# Patient Record
Sex: FEMALE | Race: WHITE | NOT HISPANIC OR LATINO | Employment: OTHER | URBAN - NONMETROPOLITAN AREA
[De-identification: names, ages, dates, MRNs, and addresses within clinical notes are randomized per-mention and may not be internally consistent; named-entity substitution may affect disease eponyms.]

---

## 2017-06-14 ENCOUNTER — TRANSCRIBE ORDERS (OUTPATIENT)
Dept: ADMINISTRATIVE | Facility: HOSPITAL | Age: 52
End: 2017-06-14

## 2017-06-14 ENCOUNTER — HOSPITAL ENCOUNTER (OUTPATIENT)
Dept: GENERAL RADIOLOGY | Facility: HOSPITAL | Age: 52
Discharge: HOME | End: 2017-06-14
Admitting: NURSE PRACTITIONER

## 2017-06-14 DIAGNOSIS — I51.7 CARDIOMEGALY: ICD-10-CM

## 2017-06-14 DIAGNOSIS — I51.7 CARDIOMEGALY: Primary | ICD-10-CM

## 2017-06-14 PROCEDURE — 71020 HC CHEST PA AND LATERAL: CPT

## 2017-06-14 PROCEDURE — 71020 XR CHEST PA AND LATERAL: CPT | Performed by: RADIOLOGY

## 2017-06-28 ENCOUNTER — TRANSCRIBE ORDERS (OUTPATIENT)
Dept: ADMINISTRATIVE | Facility: HOSPITAL | Age: 52
End: 2017-06-28

## 2017-06-28 DIAGNOSIS — I51.7 CARDIOMEGALY: Primary | ICD-10-CM

## 2017-07-03 ENCOUNTER — HOSPITAL ENCOUNTER (OUTPATIENT)
Dept: RESPIRATORY THERAPY | Facility: HOSPITAL | Age: 52
Discharge: HOME | End: 2017-07-03

## 2017-07-03 ENCOUNTER — HOSPITAL ENCOUNTER (OUTPATIENT)
Dept: CARDIOLOGY | Facility: HOSPITAL | Age: 52
Discharge: HOME | End: 2017-07-03
Admitting: NURSE PRACTITIONER

## 2017-07-03 ENCOUNTER — TRANSCRIBE ORDERS (OUTPATIENT)
Dept: ADMINISTRATIVE | Facility: HOSPITAL | Age: 52
End: 2017-07-03

## 2017-07-03 DIAGNOSIS — E66.9 OBESITY, UNSPECIFIED OBESITY SEVERITY, UNSPECIFIED OBESITY TYPE: ICD-10-CM

## 2017-07-03 DIAGNOSIS — I15.9 SECONDARY HYPERTENSION: ICD-10-CM

## 2017-07-03 DIAGNOSIS — I51.7 CARDIOMEGALY: ICD-10-CM

## 2017-07-03 DIAGNOSIS — I51.7 CARDIOMEGALY: Primary | ICD-10-CM

## 2017-07-03 PROCEDURE — 93005 ELECTROCARDIOGRAM TRACING: CPT | Performed by: NURSE PRACTITIONER

## 2017-07-03 PROCEDURE — 93010 ELECTROCARDIOGRAM REPORT: CPT | Performed by: INTERNAL MEDICINE

## 2017-07-03 PROCEDURE — 93306 TTE W/DOPPLER COMPLETE: CPT | Performed by: INTERNAL MEDICINE

## 2017-07-03 PROCEDURE — 93306 TTE W/DOPPLER COMPLETE: CPT

## 2017-07-05 LAB
BH CV ECHO MEAS - ACS: 1.7 CM
BH CV ECHO MEAS - AO MAX PG (FULL): 4.3 MMHG
BH CV ECHO MEAS - AO MAX PG: 10 MMHG
BH CV ECHO MEAS - AO MEAN PG (FULL): 2.4 MMHG
BH CV ECHO MEAS - AO MEAN PG: 5.4 MMHG
BH CV ECHO MEAS - AO ROOT AREA (BSA CORRECTED): 1.3
BH CV ECHO MEAS - AO ROOT AREA: 8.4 CM^2
BH CV ECHO MEAS - AO ROOT DIAM: 3.3 CM
BH CV ECHO MEAS - AO V2 MAX: 158.1 CM/SEC
BH CV ECHO MEAS - AO V2 MEAN: 109.9 CM/SEC
BH CV ECHO MEAS - AO V2 VTI: 31.7 CM
BH CV ECHO MEAS - BSA(HAYCOCK): 2.8 M^2
BH CV ECHO MEAS - BSA: 2.6 M^2
BH CV ECHO MEAS - BZI_BMI: 43.1 KILOGRAMS/M^2
BH CV ECHO MEAS - BZI_METRIC_HEIGHT: 182.9 CM
BH CV ECHO MEAS - BZI_METRIC_WEIGHT: 144.2 KG
BH CV ECHO MEAS - CONTRAST EF 4CH: 56.7 ML/M^2
BH CV ECHO MEAS - EDV(CUBED): 143.2 ML
BH CV ECHO MEAS - EDV(MOD-SP4): 67 ML
BH CV ECHO MEAS - EDV(TEICH): 131.3 ML
BH CV ECHO MEAS - EF(CUBED): 80.3 %
BH CV ECHO MEAS - EF(MOD-SP4): 56.7 %
BH CV ECHO MEAS - EF(TEICH): 72.3 %
BH CV ECHO MEAS - ESV(CUBED): 28.3 ML
BH CV ECHO MEAS - ESV(MOD-SP4): 29 ML
BH CV ECHO MEAS - ESV(TEICH): 36.3 ML
BH CV ECHO MEAS - FS: 41.8 %
BH CV ECHO MEAS - IVS/LVPW: 0.78
BH CV ECHO MEAS - IVSD: 1.1 CM
BH CV ECHO MEAS - LA DIMENSION: 2.8 CM
BH CV ECHO MEAS - LA/AO: 0.86
BH CV ECHO MEAS - LV DIASTOLIC VOL/BSA (35-75): 25.8 ML/M^2
BH CV ECHO MEAS - LV MASS(C)D: 253 GRAMS
BH CV ECHO MEAS - LV MASS(C)DI: 97.5 GRAMS/M^2
BH CV ECHO MEAS - LV MAX PG: 5.7 MMHG
BH CV ECHO MEAS - LV MEAN PG: 3 MMHG
BH CV ECHO MEAS - LV SYSTOLIC VOL/BSA (12-30): 11.2 ML/M^2
BH CV ECHO MEAS - LV V1 MAX: 119.1 CM/SEC
BH CV ECHO MEAS - LV V1 MEAN: 81.3 CM/SEC
BH CV ECHO MEAS - LV V1 VTI: 23.2 CM
BH CV ECHO MEAS - LVIDD: 5.2 CM
BH CV ECHO MEAS - LVIDS: 3 CM
BH CV ECHO MEAS - LVLD AP4: 7.4 CM
BH CV ECHO MEAS - LVLS AP4: 6 CM
BH CV ECHO MEAS - LVPWD: 1.4 CM
BH CV ECHO MEAS - MV A MAX VEL: 78.5 CM/SEC
BH CV ECHO MEAS - MV DEC TIME: 0.27 SEC
BH CV ECHO MEAS - MV E MAX VEL: 89.3 CM/SEC
BH CV ECHO MEAS - MV E/A: 1.1
BH CV ECHO MEAS - MV MAX PG: 3.3 MMHG
BH CV ECHO MEAS - MV MEAN PG: 1.2 MMHG
BH CV ECHO MEAS - MV V2 MAX: 90.3 CM/SEC
BH CV ECHO MEAS - MV V2 MEAN: 47.6 CM/SEC
BH CV ECHO MEAS - MV V2 VTI: 28 CM
BH CV ECHO MEAS - PA ACC SLOPE: 486.7 CM/SEC^2
BH CV ECHO MEAS - PA ACC TIME: 0.13 SEC
BH CV ECHO MEAS - PA MAX PG: 2.9 MMHG
BH CV ECHO MEAS - PA MEAN PG: 1.7 MMHG
BH CV ECHO MEAS - PA PR(ACCEL): 22 MMHG
BH CV ECHO MEAS - PA V2 MAX: 85.7 CM/SEC
BH CV ECHO MEAS - PA V2 MEAN: 62.4 CM/SEC
BH CV ECHO MEAS - PA V2 VTI: 19.6 CM
BH CV ECHO MEAS - RAP SYSTOLE: 10 MMHG
BH CV ECHO MEAS - RVSP: 36.7 MMHG
BH CV ECHO MEAS - SI(AO): 102.8 ML/M^2
BH CV ECHO MEAS - SI(CUBED): 44.3 ML/M^2
BH CV ECHO MEAS - SI(MOD-SP4): 14.6 ML/M^2
BH CV ECHO MEAS - SI(TEICH): 36.6 ML/M^2
BH CV ECHO MEAS - SV(AO): 266.8 ML
BH CV ECHO MEAS - SV(CUBED): 114.9 ML
BH CV ECHO MEAS - SV(MOD-SP4): 38 ML
BH CV ECHO MEAS - SV(TEICH): 95 ML
BH CV ECHO MEAS - TR MAX VEL: 258.2 CM/SEC

## 2017-07-14 ENCOUNTER — TRANSCRIBE ORDERS (OUTPATIENT)
Dept: CARDIOLOGY | Facility: CLINIC | Age: 52
End: 2017-07-14

## 2017-07-14 DIAGNOSIS — R07.9 CHEST PAIN, UNSPECIFIED TYPE: Primary | ICD-10-CM

## 2017-07-20 ENCOUNTER — OFFICE VISIT (OUTPATIENT)
Dept: CARDIOLOGY | Facility: CLINIC | Age: 52
End: 2017-07-20

## 2017-07-20 VITALS
BODY MASS INDEX: 39.68 KG/M2 | OXYGEN SATURATION: 95 % | WEIGHT: 293 LBS | SYSTOLIC BLOOD PRESSURE: 134 MMHG | HEART RATE: 72 BPM | HEIGHT: 72 IN | DIASTOLIC BLOOD PRESSURE: 82 MMHG

## 2017-07-20 DIAGNOSIS — E66.01 OBESITY, CLASS III, BMI 40-49.9 (MORBID OBESITY) (HCC): ICD-10-CM

## 2017-07-20 DIAGNOSIS — R53.83 FATIGUE, UNSPECIFIED TYPE: ICD-10-CM

## 2017-07-20 DIAGNOSIS — R07.9 CHEST PAIN IN ADULT: Primary | ICD-10-CM

## 2017-07-20 DIAGNOSIS — R60.0 BILATERAL LEG EDEMA: ICD-10-CM

## 2017-07-20 DIAGNOSIS — I10 ESSENTIAL HYPERTENSION: ICD-10-CM

## 2017-07-20 PROCEDURE — 99204 OFFICE O/P NEW MOD 45 MIN: CPT | Performed by: INTERNAL MEDICINE

## 2017-07-20 PROCEDURE — 93000 ELECTROCARDIOGRAM COMPLETE: CPT | Performed by: INTERNAL MEDICINE

## 2017-07-20 RX ORDER — LISINOPRIL 10 MG/1
TABLET ORAL
Refills: 6 | COMMUNITY
Start: 2017-06-30 | End: 2021-08-10

## 2017-07-20 RX ORDER — CLOPIDOGREL BISULFATE 75 MG/1
TABLET ORAL
Refills: 5 | COMMUNITY
Start: 2017-07-10 | End: 2022-08-15

## 2017-07-20 RX ORDER — FUROSEMIDE 20 MG/1
TABLET ORAL
Refills: 1 | COMMUNITY
Start: 2017-06-20 | End: 2019-08-27

## 2017-07-20 NOTE — PROGRESS NOTES
Subjective   Chief Complaint   Patient presents with   • Chest Pain   • Hypertension   • Edema       History of Present Illness  Patient is 52 years old white female who was sent for cardiac evaluation because of chest pain.  Patient states that she has been having chest pain in the left anterior chest lower sternal area between the breast and the sternum.  Pain is sometimes sharp and sometimes dull.  It is not related to exertion.  There is no accompanying nausea or vomiting.  There is no radiation of the pain.  Pain has been quite severe on occasions.    Patient has no prior cardiac history.  She has no rheumatic fever or heart murmur.  She had a chest x-ray which was reported as mild cardiomegaly for that reason she had an echocardiogram which was reported normal.    Hypertension  Patient is taking lisinopril 10 mg daily but she says that she does not have high blood pressure she passed her 's examination without blood pressure medications.  According to her she is taking lisinopril as precaution.    Ankle edema  Patient has lower extremity edema with stasis dermatitis she had venous Doppler done about 3 years ago which was negative.  She has been taking some Plavix.  Reason of Plavix therapy is not clear.  There is no known PAD.  Patient denies claudication and there has been no arterial Doppler study.    She is also quite a bit overweight with BMI of around 42.  She denies sleep apnea or thyroid problems  Mother had quadruple bypass in her 50s and she also was diabetic    Past Surgical History:   Procedure Laterality Date   • KNEE ARTHROSCOPY Right 2004     Family History   Problem Relation Age of Onset   • Heart attack Mother    • Heart disease Mother    • Diabetes Mother    • Kidney failure Mother    • Obesity Mother    • Kidney disease Father    • COPD Father    • Stroke Sister    • Hypertension Sister    • COPD Brother    • Heart disease Sister    • Heart attack Sister    • Heart failure Sister   "    Past Medical History:   Diagnosis Date   • Emphysema, unspecified    • Hypertension        Patient Active Problem List   Diagnosis   • Chest pain in adult   • Essential hypertension   • Bilateral leg edema   • Obesity, Class III, BMI 40-49.9 (morbid obesity)         Social History   Substance Use Topics   • Smoking status: Never Smoker   • Smokeless tobacco: None   • Alcohol use None      Comment:  occ  3-4 times a year         The following portions of the patient's history were reviewed and updated as appropriate: allergies, current medications, past family history, past medical history, past social history, past surgical history and problem list.    Allergies   Allergen Reactions   • Clindamycin/Lincomycin    • Penicillins    • Sulfa Antibiotics    • Vancomycin          Current Outpatient Prescriptions:   •  clopidogrel (PLAVIX) 75 MG tablet, TAKE ONE TABLET BY MOUTH EVERY DAY, Disp: , Rfl: 5  •  furosemide (LASIX) 20 MG tablet, , Disp: , Rfl: 1  •  lisinopril (PRINIVIL,ZESTRIL) 10 MG tablet, TAKE ONE TABLET BY MOUTH EVERY DAY, Disp: , Rfl: 6    Review of Systems   Constitution: Positive for weight gain. Negative for chills, decreased appetite, diaphoresis, fever and night sweats.   HENT: Negative.  Negative for congestion and headaches.    Eyes: Negative.    Cardiovascular: Positive for chest pain and leg swelling. Negative for claudication, cyanosis, dyspnea on exertion, irregular heartbeat, near-syncope, orthopnea, palpitations, paroxysmal nocturnal dyspnea and syncope.   Respiratory: Negative.  Negative for shortness of breath.    Endocrine: Negative.    Hematologic/Lymphatic: Negative.    Skin: Positive for color change.   Musculoskeletal: Negative.    Gastrointestinal: Negative.    Genitourinary: Negative.    Neurological: Negative.    Psychiatric/Behavioral: Negative.         Objective      /82 (BP Location: Left arm, Patient Position: Sitting)  Pulse 72  Ht 73\" (185.4 cm)  Wt (!) 321 lb (146 " kg)  SpO2 95%  BMI 42.35 kg/m2    Physical Exam   Constitutional: She appears well-developed and well-nourished.   Obesity   HENT:   Head: Normocephalic and atraumatic.   Mouth/Throat: Oropharynx is clear and moist.   Eyes: Conjunctivae and EOM are normal. Pupils are equal, round, and reactive to light. No scleral icterus.   Neck: Normal range of motion. Neck supple. No JVD present. No tracheal deviation present. No thyromegaly present.   Cardiovascular: Normal rate, regular rhythm, normal heart sounds and intact distal pulses.  Exam reveals no friction rub.    No murmur heard.  Pulmonary/Chest: Effort normal and breath sounds normal. No respiratory distress. She has no wheezes. She has no rales. She exhibits no tenderness.   Abdominal: Soft. Bowel sounds are normal. She exhibits no distension and no mass. There is no tenderness. There is no rebound and no guarding.   Musculoskeletal: Normal range of motion. She exhibits edema. She exhibits no tenderness or deformity.   Evidence of stasis dermatitis both lower extremities in the shin area   Lymphadenopathy:     She has no cervical adenopathy.   Neurological: She is alert. She has normal reflexes. No cranial nerve deficit. She exhibits normal muscle tone. Coordination normal.   Skin: Skin is warm and dry.   Psychiatric: She has a normal mood and affect. Her behavior is normal. Judgment and thought content normal.       Lab Review:  Labs ordered including T4 TSH lipid panel, CMP, CBC and stress test      ECG 12 Lead  Date/Time: 7/20/2017 5:57 PM  Performed by: LINDSEY SANCHEZ  Authorized by: LINDSEY SANCHEZ   Rhythm: sinus rhythm  Rate: normal  Conduction: conduction normal  ST Segments: ST segments normal  T Waves: T waves normal  QRS axis: normal  Other: no other findings  Clinical impression: normal ECG            I reviewed the patient's new clinical results.  I personally viewed and interpreted the patient's EKG/lab data        Assessment:    Diagnosis Plan   1. Chest pain in adult  Stress Test With Myocardial Perfusion One Day    CBC & Differential    Comprehensive Metabolic Panel    Lipid Panel   2. Essential hypertension  ECG 12 Lead   3. Obesity, Class III, BMI 40-49.9 (morbid obesity)     4. Bilateral leg edema     5. Fatigue, unspecified type  T4, Free    TSH          Plan:  Chest pain has typical and atypical features.  It is probably noncardiac.  EKG is normal further workup however is indicated because of multiple cardiac risk factors    Stress test has been scheduled for further evaluation.  Lab work was also ordered including thyroid and lipids    Patient will be reevaluated after the studies are completed.  Weight loss and risk factor modification was emphasized      Thank you for giving me the oppertunity to participate in your patient's cardiac care.    Sincerely,    NYLA Richard M.D. FACP FACC      No Follow-up on file.

## 2017-08-01 ENCOUNTER — HOSPITAL ENCOUNTER (OUTPATIENT)
Dept: CARDIOLOGY | Facility: HOSPITAL | Age: 52
Discharge: HOME | End: 2017-08-01
Attending: INTERNAL MEDICINE

## 2017-08-01 ENCOUNTER — HOSPITAL ENCOUNTER (OUTPATIENT)
Dept: NUCLEAR MEDICINE | Facility: HOSPITAL | Age: 52
Discharge: HOME | End: 2017-08-01
Attending: INTERNAL MEDICINE

## 2017-08-01 ENCOUNTER — APPOINTMENT (OUTPATIENT)
Dept: LAB | Facility: HOSPITAL | Age: 52
End: 2017-08-01

## 2017-08-01 DIAGNOSIS — R07.9 CHEST PAIN IN ADULT: ICD-10-CM

## 2017-08-01 LAB
ALBUMIN SERPL-MCNC: 4.3 G/DL (ref 3.5–5)
ALBUMIN/GLOB SERPL: 1.1 G/DL (ref 1.5–2.5)
ALP SERPL-CCNC: 58 U/L (ref 35–104)
ALT SERPL W P-5'-P-CCNC: 18 U/L (ref 10–36)
ANION GAP SERPL CALCULATED.3IONS-SCNC: 4.4 MMOL/L (ref 3.6–11.2)
AST SERPL-CCNC: 20 U/L (ref 10–30)
BASOPHILS # BLD AUTO: 0.02 10*3/MM3 (ref 0–0.3)
BASOPHILS NFR BLD AUTO: 0.3 % (ref 0–2)
BILIRUB SERPL-MCNC: 0.6 MG/DL (ref 0.2–1.8)
BUN BLD-MCNC: 14 MG/DL (ref 7–21)
BUN/CREAT SERPL: 14.6 (ref 7–25)
CALCIUM SPEC-SCNC: 9.4 MG/DL (ref 7.7–10)
CHLORIDE SERPL-SCNC: 107 MMOL/L (ref 99–112)
CHOLEST SERPL-MCNC: 151 MG/DL (ref 0–200)
CO2 SERPL-SCNC: 28.6 MMOL/L (ref 24.3–31.9)
CREAT BLD-MCNC: 0.96 MG/DL (ref 0.43–1.29)
DEPRECATED RDW RBC AUTO: 48.5 FL (ref 37–54)
EOSINOPHIL # BLD AUTO: 0.33 10*3/MM3 (ref 0–0.7)
EOSINOPHIL NFR BLD AUTO: 5 % (ref 0–5)
ERYTHROCYTE [DISTWIDTH] IN BLOOD BY AUTOMATED COUNT: 13.3 % (ref 11.5–14.5)
GFR SERPL CREATININE-BSD FRML MDRD: 61 ML/MIN/1.73
GLOBULIN UR ELPH-MCNC: 3.9 GM/DL
GLUCOSE BLD-MCNC: 138 MG/DL (ref 70–110)
HCT VFR BLD AUTO: 46.4 % (ref 37–47)
HDLC SERPL-MCNC: 29 MG/DL (ref 60–100)
HGB BLD-MCNC: 15.5 G/DL (ref 12–16)
IMM GRANULOCYTES # BLD: 0.02 10*3/MM3 (ref 0–0.03)
IMM GRANULOCYTES NFR BLD: 0.3 % (ref 0–0.5)
LDLC SERPL CALC-MCNC: 91 MG/DL (ref 0–100)
LDLC/HDLC SERPL: 3.14 {RATIO}
LYMPHOCYTES # BLD AUTO: 3.1 10*3/MM3 (ref 1–3)
LYMPHOCYTES NFR BLD AUTO: 47.1 % (ref 21–51)
MCH RBC QN AUTO: 33.7 PG (ref 27–33)
MCHC RBC AUTO-ENTMCNC: 33.4 G/DL (ref 33–37)
MCV RBC AUTO: 100.9 FL (ref 80–94)
MONOCYTES # BLD AUTO: 0.73 10*3/MM3 (ref 0.1–0.9)
MONOCYTES NFR BLD AUTO: 11.1 % (ref 0–10)
NEUTROPHILS # BLD AUTO: 2.38 10*3/MM3 (ref 1.4–6.5)
NEUTROPHILS NFR BLD AUTO: 36.2 % (ref 30–70)
OSMOLALITY SERPL CALC.SUM OF ELEC: 282.1 MOSM/KG (ref 273–305)
PLATELET # BLD AUTO: 193 10*3/MM3 (ref 130–400)
PMV BLD AUTO: 10.4 FL (ref 6–10)
POTASSIUM BLD-SCNC: 4.3 MMOL/L (ref 3.5–5.3)
PROT SERPL-MCNC: 8.2 G/DL (ref 6–8)
RBC # BLD AUTO: 4.6 10*6/MM3 (ref 4.2–5.4)
SODIUM BLD-SCNC: 140 MMOL/L (ref 135–153)
T4 FREE SERPL-MCNC: 0.86 NG/DL (ref 0.89–1.76)
TRIGL SERPL-MCNC: 154 MG/DL (ref 0–150)
TSH SERPL DL<=0.05 MIU/L-ACNC: 3.37 MIU/ML (ref 0.55–4.78)
VLDLC SERPL-MCNC: 30.8 MG/DL
WBC NRBC COR # BLD: 6.58 10*3/MM3 (ref 4.5–12.5)

## 2017-08-01 PROCEDURE — 25010000002 REGADENOSON 0.4 MG/5ML SOLUTION: Performed by: INTERNAL MEDICINE

## 2017-08-01 PROCEDURE — A9500 TC99M SESTAMIBI: HCPCS | Performed by: INTERNAL MEDICINE

## 2017-08-01 PROCEDURE — 78452 HT MUSCLE IMAGE SPECT MULT: CPT | Performed by: INTERNAL MEDICINE

## 2017-08-01 PROCEDURE — 78452 HT MUSCLE IMAGE SPECT MULT: CPT

## 2017-08-01 PROCEDURE — 93017 CV STRESS TEST TRACING ONLY: CPT

## 2017-08-01 PROCEDURE — 84443 ASSAY THYROID STIM HORMONE: CPT | Performed by: INTERNAL MEDICINE

## 2017-08-01 PROCEDURE — 25010000002 AMINOPHYLLINE PER 250 MG: Performed by: INTERNAL MEDICINE

## 2017-08-01 PROCEDURE — 84439 ASSAY OF FREE THYROXINE: CPT | Performed by: INTERNAL MEDICINE

## 2017-08-01 PROCEDURE — 80061 LIPID PANEL: CPT | Performed by: INTERNAL MEDICINE

## 2017-08-01 PROCEDURE — 0 TECHNETIUM SESTAMIBI: Performed by: INTERNAL MEDICINE

## 2017-08-01 PROCEDURE — 93018 CV STRESS TEST I&R ONLY: CPT | Performed by: INTERNAL MEDICINE

## 2017-08-01 PROCEDURE — 85025 COMPLETE CBC W/AUTO DIFF WBC: CPT | Performed by: INTERNAL MEDICINE

## 2017-08-01 PROCEDURE — 80053 COMPREHEN METABOLIC PANEL: CPT | Performed by: INTERNAL MEDICINE

## 2017-08-01 RX ORDER — AMINOPHYLLINE DIHYDRATE 25 MG/ML
125 INJECTION, SOLUTION INTRAVENOUS
Status: COMPLETED | OUTPATIENT
Start: 2017-08-01 | End: 2017-08-01

## 2017-08-01 RX ADMIN — TECHNETIUM TC-99M SESTAMIBI 1 DOSE: 1 INJECTION INTRAVENOUS at 08:05

## 2017-08-01 RX ADMIN — AMINOPHYLLINE 125 MG: 25 INJECTION, SOLUTION INTRAVENOUS at 10:08

## 2017-08-01 RX ADMIN — TECHNETIUM TC-99M SESTAMIBI 1 DOSE: 1 INJECTION INTRAVENOUS at 10:02

## 2017-08-01 RX ADMIN — REGADENOSON 0.4 MG: 0.08 INJECTION, SOLUTION INTRAVENOUS at 10:02

## 2017-08-02 LAB
BH CV NUCLEAR PRIOR STUDY: 3
BH CV STRESS BP STAGE 1: NORMAL
BH CV STRESS BP STAGE 2: NORMAL
BH CV STRESS COMMENTS STAGE 1: NORMAL
BH CV STRESS COMMENTS STAGE 2: NORMAL
BH CV STRESS DOSE REGADENOSON STAGE 1: 0.4
BH CV STRESS DURATION MIN STAGE 1: 0
BH CV STRESS DURATION MIN STAGE 2: 4
BH CV STRESS DURATION SEC STAGE 1: 15
BH CV STRESS DURATION SEC STAGE 2: 0
BH CV STRESS HR STAGE 1: 112
BH CV STRESS HR STAGE 2: 129
BH CV STRESS PROTOCOL 1: NORMAL
BH CV STRESS RECOVERY BP: NORMAL MMHG
BH CV STRESS RECOVERY HR: 91 BPM
BH CV STRESS STAGE 1: 1
BH CV STRESS STAGE 2: 2
MAXIMAL PREDICTED HEART RATE: 167 BPM
PERCENT MAX PREDICTED HR: 77.25 %
STRESS BASELINE BP: NORMAL MMHG
STRESS BASELINE HR: 79 BPM
STRESS PERCENT HR: 91 %
STRESS POST EXERCISE DUR MIN: 4 MIN
STRESS POST EXERCISE DUR SEC: 45 SEC
STRESS POST PEAK BP: NORMAL MMHG
STRESS POST PEAK HR: 129 BPM
STRESS TARGET HR: 142 BPM

## 2017-08-03 ENCOUNTER — TELEPHONE (OUTPATIENT)
Dept: CARDIOLOGY | Facility: CLINIC | Age: 52
End: 2017-08-03

## 2017-08-31 ENCOUNTER — OFFICE VISIT (OUTPATIENT)
Dept: CARDIOLOGY | Facility: CLINIC | Age: 52
End: 2017-08-31

## 2017-08-31 VITALS
DIASTOLIC BLOOD PRESSURE: 88 MMHG | SYSTOLIC BLOOD PRESSURE: 126 MMHG | WEIGHT: 293 LBS | OXYGEN SATURATION: 98 % | HEIGHT: 72 IN | BODY MASS INDEX: 39.68 KG/M2 | HEART RATE: 72 BPM

## 2017-08-31 DIAGNOSIS — R07.9 CHEST PAIN IN ADULT: ICD-10-CM

## 2017-08-31 DIAGNOSIS — R60.0 BILATERAL LEG EDEMA: ICD-10-CM

## 2017-08-31 DIAGNOSIS — E66.01 OBESITY, CLASS III, BMI 40-49.9 (MORBID OBESITY) (HCC): ICD-10-CM

## 2017-08-31 DIAGNOSIS — I10 ESSENTIAL HYPERTENSION: Primary | ICD-10-CM

## 2017-08-31 PROCEDURE — 99213 OFFICE O/P EST LOW 20 MIN: CPT | Performed by: INTERNAL MEDICINE

## 2017-09-28 ENCOUNTER — OFFICE VISIT (OUTPATIENT)
Dept: PULMONOLOGY | Facility: CLINIC | Age: 52
End: 2017-09-28

## 2017-09-28 VITALS
HEIGHT: 72 IN | WEIGHT: 293 LBS | DIASTOLIC BLOOD PRESSURE: 78 MMHG | TEMPERATURE: 98.1 F | BODY MASS INDEX: 39.68 KG/M2 | HEART RATE: 80 BPM | SYSTOLIC BLOOD PRESSURE: 115 MMHG | OXYGEN SATURATION: 95 %

## 2017-09-28 DIAGNOSIS — E66.01 MORBID OBESITY WITH BMI OF 40.0-44.9, ADULT (HCC): ICD-10-CM

## 2017-09-28 DIAGNOSIS — G47.33 OSA (OBSTRUCTIVE SLEEP APNEA): ICD-10-CM

## 2017-09-28 DIAGNOSIS — J41.0 SIMPLE CHRONIC BRONCHITIS (HCC): Primary | ICD-10-CM

## 2017-09-28 PROCEDURE — 99204 OFFICE O/P NEW MOD 45 MIN: CPT | Performed by: INTERNAL MEDICINE

## 2017-09-28 NOTE — PROGRESS NOTES
Subjective   Coni Tubbs is a 52 y.o. female who is being seen for COPD    History of Present Illness   This 52-year-old female has been referred to us by her primary care provider for evaluation of ongoing shortness of breath.  Patient was having bilateral leg swelling on and off and also was having cellulitis of her lower extremities.  He was more concerned about the leg edema and so her primary care provider from where she was sent were cardiology evaluation.  Apparently she had an echocardiogram and and a stress test.  Her cardiologist told her that she has evidence of increased right heart pressure and a pulmonary cause needs to be ruled out for that.  She was then referred to us for further evaluation and management in this regard.    Symptomatically patient tells me that she has significant exertional dyspnea but at rest she does pretty well.  She has episodic cough and occasional wheezing.  She admits having some snoring but tells me that this is not bad.  Overall her sleep is not bad as she claims that she does not feel fresh in the morning time.    Past Medical History:   Diagnosis Date   • Emphysema, unspecified    • Hypertension      Past Surgical History:   Procedure Laterality Date   • CARDIOVASCULAR STRESS TEST  08/02/2017   • ECHO - CONVERTED  07/05/2017   • KNEE ARTHROSCOPY Right 2004     Family History   Problem Relation Age of Onset   • Heart attack Mother    • Heart disease Mother    • Diabetes Mother    • Kidney failure Mother    • Obesity Mother    • Kidney disease Father    • COPD Father    • Stroke Sister    • Hypertension Sister    • COPD Brother    • Heart disease Sister    • Heart attack Sister    • Heart failure Sister       reports that she has never smoked. She does not have any smokeless tobacco history on file.  Allergies   Allergen Reactions   • Clindamycin/Lincomycin    • Penicillins    • Sulfa Antibiotics    • Vancomycin            The following portions of the patient's history  "were reviewed and updated as appropriate: allergies, current medications, past family history, past medical history, past social history, past surgical history and problem list.    Review of Systems   Constitutional: Positive for fatigue.   HENT: Positive for congestion.    Respiratory: Positive for cough, chest tightness, shortness of breath and wheezing.    Cardiovascular: Positive for leg swelling.   Allergic/Immunologic: Positive for environmental allergies.   All other systems reviewed and are negative.      Objective   /78  Pulse 80  Temp 98.1 °F (36.7 °C) (Oral)   Ht 72\" (182.9 cm)  Wt (!) 322 lb (146 kg)  SpO2 95%  BMI 43.67 kg/m2  Physical Exam   Constitutional: She is oriented to person, place, and time. She appears well-developed and well-nourished.   HENT:   Head: Normocephalic and atraumatic.    Crowded oropharynx.  Mallampati score 3   Eyes: EOM are normal. Pupils are equal, round, and reactive to light.   Neck: Normal range of motion. Neck supple.   Thick neck   Cardiovascular: Normal rate and regular rhythm.    Pulmonary/Chest: Effort normal. She has rales.   Bibasilar rales   Abdominal: Soft. Bowel sounds are normal.   Protuberant belly, abdominal obesity   Musculoskeletal: She exhibits edema.   Neurological: She is alert and oriented to person, place, and time.   Skin: Skin is warm and dry.   Psychiatric: She has a normal mood and affect. Her behavior is normal.   Nursing note and vitals reviewed.        Radiology:  Xr Chest Pa & Lateral    Result Date: 6/14/2017  Mildly enlarged cardiac silhouette. No active intrathoracic disease is demonstrated.  This report was finalized on 6/14/2017 11:11 AM by Dr. Jaime Reed II, MD.        Lab Results:  Hospital Outpatient Visit on 08/01/2017   Component Date Value Ref Range Status   • Nuclear Prior Study 08/01/2017 3   Final   • Exercise duration (sec) 08/01/2017 45  sec Final   • Exercise duration (min) 08/01/2017 4  min Final   • Target HR " (85%) 08/01/2017 142  bpm Final   • Max. Pred. HR (100%) 08/01/2017 167  bpm Final   • BH CV STRESS PROTOCOL 1 08/01/2017 Pharmacologic   Final   • Stage 1 08/01/2017 1   Final   • HR Stage 1 08/01/2017 112   Final   • BP Stage 1 08/01/2017 143/94   Final   • Duration Min Stage 1 08/01/2017 0   Final   • Duration Sec Stage 1 08/01/2017 15   Final   • Stress Dose Regadenoson Stage 1 08/01/2017 0.4   Final   • Stress Comments Stage 1 08/01/2017 15 sec bolus injection   Final   • Stage 2 08/01/2017 2   Final   • HR Stage 2 08/01/2017 129   Final   • BP Stage 2 08/01/2017 150/89   Final   • Duration Min Stage 2 08/01/2017 4   Final   • Duration Sec Stage 2 08/01/2017 0   Final   • Stress Comments Stage 2 08/01/2017 recovery   Final   • Baseline HR 08/01/2017 79  bpm Final   • Baseline BP 08/01/2017 143/100  mmHg Final   • Peak HR 08/01/2017 129  bpm Final   • Percent Max Pred HR 08/01/2017 77.25  % Final   • Percent Target HR 08/01/2017 91  % Final   • Peak BP 08/01/2017 159/110  mmHg Final   • Recovery HR 08/01/2017 91  bpm Final   • Recovery BP 08/01/2017 124/90  mmHg Final       Assessment      ICD-10-CM ICD-9-CM   1. Simple chronic bronchitis J41.0 491.0   2. SUSAN (obstructive sleep apnea) G47.33 327.23   3. Morbid obesity with BMI of 40.0-44.9, adult E66.01 278.01    Z68.41 V85.41                DISCUSSION:  This morbidly obese patient comes to us as she was told by her cardiologist that she has increased right heart pressure and he wanted to rule out pulmonary cause.  Patient also gives me a history of episodic leg swelling requiring diuretics.  I have reviewed her echocardiogram which shows evidence of mild diastolic dysfunction which may account for some of the bottoms of shortness of breath and swelling of leg.  But from pulmonary standpoint our differential diagnosis would include nocturnal hypoxemia leading to increased fluid retention, obstructive sleep apnea comes high in the list.  We are sending her for a  nocturnal polysomnography to evaluate that.  Patient also has exertional dyspnea for which we would do a pulmonary function test for objective assessment of her airways function.  I have reviewed her chest x-ray, there is evidence of pulmonary vascular congestion.  Cannot rule out coexisting interstitial fibrotic changes.  We are sending her for a CT scan of the chest with high resolution and thin cut  I would see her again after this test and further management plan will depend on the findings.        Plan    Orders Placed This Encounter   Procedures   • CT Chest Without Contrast   • Ambulatory Referral to Sleep Medicine   • Pulmonary Function Test     No orders of the defined types were placed in this encounter.                 Nell Rangel MD, FCCP, [facility]  Pulmonary, Critical Care, and Sleep Medicine

## 2017-11-06 ENCOUNTER — HOSPITAL ENCOUNTER (OUTPATIENT)
Dept: CT IMAGING | Facility: HOSPITAL | Age: 52
Discharge: HOME | End: 2017-11-06
Attending: INTERNAL MEDICINE

## 2017-11-06 ENCOUNTER — HOSPITAL ENCOUNTER (OUTPATIENT)
Dept: RESPIRATORY THERAPY | Facility: HOSPITAL | Age: 52
Discharge: HOME | End: 2017-11-06
Attending: INTERNAL MEDICINE | Admitting: INTERNAL MEDICINE

## 2017-11-06 DIAGNOSIS — E66.01 MORBID OBESITY WITH BMI OF 40.0-44.9, ADULT (HCC): ICD-10-CM

## 2017-11-06 DIAGNOSIS — G47.33 OSA (OBSTRUCTIVE SLEEP APNEA): ICD-10-CM

## 2017-11-06 DIAGNOSIS — J41.0 SIMPLE CHRONIC BRONCHITIS (HCC): ICD-10-CM

## 2017-11-06 PROCEDURE — 94729 DIFFUSING CAPACITY: CPT | Performed by: INTERNAL MEDICINE

## 2017-11-06 PROCEDURE — 71250 CT THORAX DX C-: CPT

## 2017-11-06 PROCEDURE — 71250 CT THORAX DX C-: CPT | Performed by: RADIOLOGY

## 2017-11-06 PROCEDURE — 94060 EVALUATION OF WHEEZING: CPT

## 2017-11-06 PROCEDURE — 94727 GAS DIL/WSHOT DETER LNG VOL: CPT

## 2017-11-06 PROCEDURE — 94727 GAS DIL/WSHOT DETER LNG VOL: CPT | Performed by: INTERNAL MEDICINE

## 2017-11-06 PROCEDURE — 94729 DIFFUSING CAPACITY: CPT

## 2017-11-06 PROCEDURE — 94060 EVALUATION OF WHEEZING: CPT | Performed by: INTERNAL MEDICINE

## 2017-11-28 ENCOUNTER — OFFICE VISIT (OUTPATIENT)
Dept: PULMONOLOGY | Facility: CLINIC | Age: 52
End: 2017-11-28

## 2017-11-28 VITALS
TEMPERATURE: 97.4 F | BODY MASS INDEX: 39.68 KG/M2 | DIASTOLIC BLOOD PRESSURE: 84 MMHG | SYSTOLIC BLOOD PRESSURE: 136 MMHG | HEART RATE: 75 BPM | HEIGHT: 72 IN | OXYGEN SATURATION: 97 % | WEIGHT: 293 LBS

## 2017-11-28 DIAGNOSIS — E66.01 MORBID OBESITY WITH BMI OF 40.0-44.9, ADULT (HCC): ICD-10-CM

## 2017-11-28 DIAGNOSIS — R94.2 RESTRICTIVE VENTILATORY DEFECT: ICD-10-CM

## 2017-11-28 DIAGNOSIS — G47.33 OSA (OBSTRUCTIVE SLEEP APNEA): Primary | ICD-10-CM

## 2017-11-28 PROCEDURE — 99214 OFFICE O/P EST MOD 30 MIN: CPT | Performed by: INTERNAL MEDICINE

## 2017-11-28 RX ORDER — LOSARTAN POTASSIUM 25 MG/1
25 TABLET ORAL DAILY
COMMUNITY
End: 2018-02-28

## 2017-11-28 RX ORDER — ALBUTEROL SULFATE 90 UG/1
2 AEROSOL, METERED RESPIRATORY (INHALATION) EVERY 4 HOURS PRN
Qty: 1 INHALER | Refills: 11 | Status: SHIPPED | OUTPATIENT
Start: 2017-11-28

## 2017-11-28 NOTE — PROGRESS NOTES
Subjective   Coni Tubbs is a 52 y.o. female who is being seen for Results    History of Present Illness   Patient returns for follow-up for ongoing shortness of breath and sleep disturbance.  This patient was sent to us for evaluation of pulmonary problems in view of increased right heart pressure found by her cardiologist in an echocardiogram. We have sent her for a pulmonary function test and a nocturnal polysomnography, she had those done today was to discuss the results.    Symptom wise she still has the same complaint of exertional dyspnea as well as fragmented sleep, unrefreshed feeling in the morning time and increased daytime sleepiness and fatigue.  She also has swelling of her ankles as before.  Past Medical History:   Diagnosis Date   • Emphysema, unspecified    • Hypertension      Past Surgical History:   Procedure Laterality Date   • CARDIOVASCULAR STRESS TEST  08/02/2017   • ECHO - CONVERTED  07/05/2017   • KNEE ARTHROSCOPY Right 2004     Family History   Problem Relation Age of Onset   • Heart attack Mother    • Heart disease Mother    • Diabetes Mother    • Kidney failure Mother    • Obesity Mother    • Kidney disease Father    • COPD Father    • Stroke Sister    • Hypertension Sister    • COPD Brother    • Heart disease Sister    • Heart attack Sister    • Heart failure Sister       reports that she has never smoked. She has never used smokeless tobacco. She reports that she does not drink alcohol or use illicit drugs.  Allergies   Allergen Reactions   • Clindamycin/Lincomycin    • Penicillins    • Sulfa Antibiotics    • Vancomycin            The following portions of the patient's history were reviewed and updated as appropriate: allergies, current medications, past family history, past medical history, past social history, past surgical history and problem list.    Review of Systems   Constitutional: Negative for appetite change, chills, diaphoresis and unexpected weight change.   HENT: Negative  "for sore throat, trouble swallowing and voice change.    Eyes: Negative for visual disturbance.   Respiratory: Positive for shortness of breath. Negative for apnea, cough, choking and wheezing.    Cardiovascular: Negative for chest pain, palpitations and leg swelling.   Gastrointestinal: Negative for abdominal pain, constipation, diarrhea, nausea and vomiting.   Endocrine: Negative for cold intolerance, heat intolerance, polydipsia, polyphagia and polyuria.   Genitourinary: Negative for difficulty urinating and dysuria.   Musculoskeletal: Negative for gait problem.   Skin: Negative for rash and wound.   Neurological: Negative for syncope and light-headedness.   Hematological: Negative for adenopathy.   Psychiatric/Behavioral: Negative for agitation, behavioral problems and confusion.   All other systems reviewed and are negative.      Objective   /84 (BP Location: Left arm, Patient Position: Sitting)  Pulse 75  Temp 97.4 °F (36.3 °C)  Ht 72\" (182.9 cm)  Wt (!) 331 lb 3.2 oz (150 kg)  SpO2 97%  BMI 44.92 kg/m2  Physical Exam   Constitutional: She is oriented to person, place, and time.   HENT:   Head: Normocephalic and atraumatic.   Nose: Mucosal edema present.   Eyes: EOM are normal. Pupils are equal, round, and reactive to light.   Neck: Neck supple.   Cardiovascular: Normal rate, regular rhythm and normal heart sounds.    Pulmonary/Chest: She has rhonchi.   Vesicular breath sound bilaterally with prolonged expiratory phase   Abdominal: Soft. Bowel sounds are normal.   Musculoskeletal: Normal range of motion. She exhibits no deformity.   Neurological: She is alert and oriented to person, place, and time.   Skin: Skin is warm and dry.   Psychiatric: She has a normal mood and affect. Her behavior is normal.   Nursing note and vitals reviewed.        Radiology:  Ct Chest Without Contrast    Result Date: 11/7/2017  EXAM: CT CHEST WO CONTRAST-               CLINICAL INDICATION:Rule out interstitial lung " disease; J41.0-Simple  chronic bronchitis; G47.33-Obstructive sleep apnea (adult) (pediatric);  E66.01-Morbid (severe) obesity due to excess calories; Z68.41-Body mass  index (BMI) 40.0-44.9, adult       COMPARISON: NONE.      TECHNIQUE: Multiple axial CT images were obtained from lung apex through  upper abdomen WITHOUTadministration of IV contrast. Reformatted images  in the coronal and/or sagittal plane(s) were generated from the axial  data set to facilitate diagnostic accuracy and/or surgical planning.      High-resolution sequences obtained in the axial plane at selected  intervals per high-resolution protocol.  Oral Contrast:NONE.      Radiation dose reduction techniques were utilized per ALARA protocol.  Automated exposure control was initiated through either or [employer] or  DoseRight software packages by  protocol.    DOSE (DLP mGy-cm): 810.79 mGy.cm          FINDINGS:      The pulmonary interstitium is within normal limits. No abnormal soft  tissue nodules identified. No interlobular septal thickening. No  peripheral fibrosis. No abnormal groundglass attenuation. No  centrilobular nodularity. Calcified granuloma right upper lobe is noted.      Mild cardiac enlargement. No pleural or pericardial effusion. There is  no thoracic adenopathy by CT size criteria. Hiatal hernia is present  moderate in size with accompanying herniated fat. Upper abdomen is  otherwise unremarkable except for layering gallstones.      Bone windows demonstrate no acute osseous findings. Degenerative changes  are noted.      IMPRESSION:      1. No CT evidence of abnormal interstitial lung changes.  2. Calcified granuloma right lung.  3. Mild cardiomegaly.  4. Moderate hiatal hernia.  5. Cholelithiasis and other nonacute findings as above.      This report was finalized on 11/7/2017 9:12 AM by Dr. Benja Duong MD.        Lab Results:  Hospital Outpatient Visit on 08/01/2017   Component Date Value Ref Range Status   •  Nuclear Prior Study 08/01/2017 3   Final   • Exercise duration (sec) 08/01/2017 45  sec Final   • Exercise duration (min) 08/01/2017 4  min Final   • Target HR (85%) 08/01/2017 142  bpm Final   • Max. Pred. HR (100%) 08/01/2017 167  bpm Final   •  CV STRESS PROTOCOL 1 08/01/2017 Pharmacologic   Final   • Stage 1 08/01/2017 1   Final   • HR Stage 1 08/01/2017 112   Final   • BP Stage 1 08/01/2017 143/94   Final   • Duration Min Stage 1 08/01/2017 0   Final   • Duration Sec Stage 1 08/01/2017 15   Final   • Stress Dose Regadenoson Stage 1 08/01/2017 0.4   Final   • Stress Comments Stage 1 08/01/2017 15 sec bolus injection   Final   • Stage 2 08/01/2017 2   Final   • HR Stage 2 08/01/2017 129   Final   • BP Stage 2 08/01/2017 150/89   Final   • Duration Min Stage 2 08/01/2017 4   Final   • Duration Sec Stage 2 08/01/2017 0   Final   • Stress Comments Stage 2 08/01/2017 recovery   Final   • Baseline HR 08/01/2017 79  bpm Final   • Baseline BP 08/01/2017 143/100  mmHg Final   • Peak HR 08/01/2017 129  bpm Final   • Percent Max Pred HR 08/01/2017 77.25  % Final   • Percent Target HR 08/01/2017 91  % Final   • Peak BP 08/01/2017 159/110  mmHg Final   • Recovery HR 08/01/2017 91  bpm Final   • Recovery BP 08/01/2017 124/90  mmHg Final           Assessment      ICD-10-CM ICD-9-CM   1. SUSAN (obstructive sleep apnea) G47.33 327.23   2. Morbid obesity with BMI of 40.0-44.9, adult E66.01 278.01    Z68.41 V85.41   3. Restrictive ventilatory defect R94.2 794.2                DISCUSSION:  I have reviewed the pulmonary function test as well as the nocturnal polysomnography.  The pulmonary function test revealed restrictive ventilatory defect of mild degree.  The polysomnography revealed presence of obstructive sleep apnea with oxygen desaturation, lowest O2 sat of 85%.    This patient has a BMI of 44 and I believe this morbid obesity along with obstructive sleep apnea is at least in part contributing to her elevated right heart  pressure I had a long discussion with the patient and recommended that she be started on pressure therapy as quick as possible.  I'm giving her AutoPap with a pressure range of 5-15 cm water.  I'm also giving her a prescription for albuterol for when necessary use.    We would reevaluate her again in a month or so.    Plan    Orders Placed This Encounter   Procedures   • PAP Therapy     New Medications Ordered This Visit   Medications   • albuterol (PROAIR HFA) 108 (90 Base) MCG/ACT inhaler     Sig: Inhale 2 puffs Every 4 (Four) Hours As Needed for Shortness of Air.     Dispense:  1 inhaler     Refill:  11                  Nell Rangel MD, FCCP, FAASM  Pulmonary, Critical Care, and Sleep Medicine

## 2018-02-28 ENCOUNTER — OFFICE VISIT (OUTPATIENT)
Dept: CARDIOLOGY | Facility: CLINIC | Age: 53
End: 2018-02-28

## 2018-02-28 VITALS
OXYGEN SATURATION: 96 % | WEIGHT: 293 LBS | HEART RATE: 76 BPM | BODY MASS INDEX: 39.68 KG/M2 | HEIGHT: 72 IN | DIASTOLIC BLOOD PRESSURE: 82 MMHG | SYSTOLIC BLOOD PRESSURE: 118 MMHG

## 2018-02-28 DIAGNOSIS — E66.01 OBESITY, CLASS III, BMI 40-49.9 (MORBID OBESITY) (HCC): ICD-10-CM

## 2018-02-28 DIAGNOSIS — I10 ESSENTIAL HYPERTENSION: Primary | ICD-10-CM

## 2018-02-28 PROCEDURE — 99213 OFFICE O/P EST LOW 20 MIN: CPT | Performed by: INTERNAL MEDICINE

## 2018-02-28 NOTE — PROGRESS NOTES
subjective     Chief Complaint   Patient presents with   • Follow-up   • Hypertension     History of Present Illness  Patient is 52 years old white female who has history of hypertension, obesity, obstructive sleep apnea and bilateral lower extremity edema.  Patient is here for 6 months follow-up.  She seems to be doing somewhat better.  She has not lost any weight.    Patient recently saw Dr. Rangel and has been using CPAP which according to her has not helped much.  Patient had pulmonary function studies done lung volumes show reduced total lung capacity to 48%.  FEV1 was 77% predicted with no improvement after bronchodilator therapy.    Patient is quite heavy and is struggling to lose weight.    Past Surgical History:   Procedure Laterality Date   • CARDIOVASCULAR STRESS TEST  08/02/2017   • ECHO - CONVERTED  07/05/2017   • KNEE ARTHROSCOPY Right 2004   • SKIN CANCER EXCISION Left 12/2017     Family History   Problem Relation Age of Onset   • Heart attack Mother    • Heart disease Mother    • Diabetes Mother    • Kidney failure Mother    • Obesity Mother    • Kidney disease Father    • COPD Father    • Stroke Sister    • Hypertension Sister    • COPD Brother    • Heart disease Sister    • Heart attack Sister    • Heart failure Sister      Past Medical History:   Diagnosis Date   • Emphysema, unspecified    • Hypertension      Patient Active Problem List   Diagnosis   • Chest pain in adult   • Essential hypertension   • Bilateral leg edema   • Obesity, Class III, BMI 40-49.9 (morbid obesity)       Social History   Substance Use Topics   • Smoking status: Never Smoker   • Smokeless tobacco: Never Used   • Alcohol use No      Comment:  occ  3-4 times a year       Allergies   Allergen Reactions   • Penicillins Other (See Comments)     Unknown    • Clindamycin/Lincomycin Itching and Rash   • Sulfa Antibiotics Itching and Rash   • Vancomycin Itching and Rash       Current Outpatient Prescriptions on File Prior to Visit  "  Medication Sig   • clopidogrel (PLAVIX) 75 MG tablet TAKE ONE TABLET BY MOUTH EVERY DAY   • furosemide (LASIX) 20 MG tablet PT TAKING EVERY OTHER DAY   • lisinopril (PRINIVIL,ZESTRIL) 10 MG tablet TAKE ONE TABLET BY MOUTH EVERY DAY   • albuterol (PROAIR HFA) 108 (90 Base) MCG/ACT inhaler Inhale 2 puffs Every 4 (Four) Hours As Needed for Shortness of Air.   • [DISCONTINUED] losartan (COZAAR) 25 MG tablet Take 25 mg by mouth Daily.     No current facility-administered medications on file prior to visit.          The following portions of the patient's history were reviewed and updated as appropriate: allergies, current medications, past family history, past medical history, past social history, past surgical history and problem list.    Review of Systems   Constitution: Negative.   HENT: Negative.  Negative for congestion.    Eyes: Negative.    Cardiovascular: Positive for dyspnea on exertion. Negative for chest pain, cyanosis, irregular heartbeat, leg swelling, near-syncope, orthopnea, palpitations, paroxysmal nocturnal dyspnea and syncope.   Respiratory: Positive for shortness of breath.    Hematologic/Lymphatic: Negative.    Musculoskeletal: Negative.    Gastrointestinal: Negative.    Neurological: Negative.  Negative for headaches.          Objective:     /82 (BP Location: Left arm, Patient Position: Sitting, Cuff Size: Adult)  Pulse 76  Ht 182.9 cm (72.01\")  Wt (!) 151 kg (332 lb 3.2 oz)  SpO2 96%  BMI 45.04 kg/m2  Physical Exam   Constitutional: She appears well-developed and well-nourished. No distress.   HENT:   Head: Normocephalic and atraumatic.   Mouth/Throat: Oropharynx is clear and moist. No oropharyngeal exudate.   Eyes: Conjunctivae and EOM are normal. Pupils are equal, round, and reactive to light. No scleral icterus.   Neck: Normal range of motion. Neck supple. No JVD present. No tracheal deviation present. No thyromegaly present.   Cardiovascular: Normal rate, regular rhythm, normal " heart sounds and intact distal pulses.  PMI is not displaced.  Exam reveals no gallop, no friction rub and no decreased pulses.    No murmur heard.  Pulses:       Carotid pulses are 3+ on the right side, and 3+ on the left side.       Radial pulses are 3+ on the right side, and 3+ on the left side.   Pulmonary/Chest: Effort normal and breath sounds normal. No respiratory distress. She has no wheezes. She has no rales. She exhibits no tenderness.   Abdominal: Soft. Bowel sounds are normal. She exhibits no distension, no abdominal bruit and no mass. There is no splenomegaly or hepatomegaly. There is no tenderness. There is no rebound and no guarding.   Musculoskeletal: Normal range of motion. She exhibits no edema, tenderness or deformity.   Lymphadenopathy:     She has no cervical adenopathy.   Neurological: She is alert. She has normal reflexes. No cranial nerve deficit. She exhibits normal muscle tone. Coordination normal.   Skin: Skin is warm and dry. No rash noted. She is not diaphoretic. No erythema.   Psychiatric: She has a normal mood and affect. Her behavior is normal. Judgment and thought content normal.         Lab Review  Lab Results   Component Value Date     08/01/2017    K 4.3 08/01/2017     08/01/2017    BUN 14 08/01/2017    CREATININE 0.96 08/01/2017    GLUCOSE 138 (H) 08/01/2017    CALCIUM 9.4 08/01/2017    ALT 18 08/01/2017    ALKPHOS 58 08/01/2017    LABIL2 1.1 (L) 08/01/2017     No results found for: CKTOTAL  Lab Results   Component Value Date    WBC 6.58 08/01/2017    HGB 15.5 08/01/2017    HCT 46.4 08/01/2017     08/01/2017     No results found for: INR  No results found for: MG  Lab Results   Component Value Date    TSH 3.369 08/01/2017     No results found for: BNP  Lab Results   Component Value Date    CHOL 151 08/01/2017    TRIG 154 (H) 08/01/2017    HDL 29 (L) 08/01/2017    VLDL 30.8 08/01/2017    LDLHDL 3.14 08/01/2017         Procedures       I personally viewed and  interpreted the patient's LAB data         Assessment:     1. Essential hypertension    2. Obesity, Class III, BMI 40-49.9 (morbid obesity)          Plan:       Blood pressure is controlled with lisinopril.  Patient has morbid obesity and has not lost any weight.  She is trying.  She has a COPD and restrictive lung disease.  Because of obstructive sleep apnea she is using CPAP but is not helping much.  Weight loss was emphasized.  No change in therapy.  Healthy lifestyle and aggressive risk factor modification emphasized.              Return in about 6 months (around 8/28/2018).

## 2018-03-07 ENCOUNTER — OFFICE VISIT (OUTPATIENT)
Dept: PULMONOLOGY | Facility: CLINIC | Age: 53
End: 2018-03-07

## 2018-03-07 VITALS
OXYGEN SATURATION: 96 % | SYSTOLIC BLOOD PRESSURE: 143 MMHG | WEIGHT: 293 LBS | DIASTOLIC BLOOD PRESSURE: 85 MMHG | BODY MASS INDEX: 39.68 KG/M2 | TEMPERATURE: 98.1 F | HEART RATE: 71 BPM | HEIGHT: 72 IN

## 2018-03-07 DIAGNOSIS — E66.01 MORBID OBESITY WITH BMI OF 40.0-44.9, ADULT (HCC): ICD-10-CM

## 2018-03-07 DIAGNOSIS — R94.2 RESTRICTIVE VENTILATORY DEFECT: ICD-10-CM

## 2018-03-07 DIAGNOSIS — G47.33 OSA (OBSTRUCTIVE SLEEP APNEA): Primary | ICD-10-CM

## 2018-03-07 PROCEDURE — 99214 OFFICE O/P EST MOD 30 MIN: CPT | Performed by: INTERNAL MEDICINE

## 2018-03-07 NOTE — PROGRESS NOTES
Subjective   Coni Tubbs is a 52 y.o. female who is being seen for Sleep Apnea    History of Present Illness   Patient returns for follow-up for sleep apnea.  During her last visit we started her on pressure therapy.  She tells me that she is using the mask on a nightly basis and has improved quite a bit in the daytime.  She also mentions that she got a call from DME company that she was not fully compliant with the machine.  She admits that she goes to bed at around 12 midnight and wakes up around 6.  But claims that she sleeps good and feels better in the morning time.  Past Medical History:   Diagnosis Date   • Emphysema, unspecified    • Hypertension      Past Surgical History:   Procedure Laterality Date   • CARDIOVASCULAR STRESS TEST  08/02/2017   • ECHO - CONVERTED  07/05/2017   • KNEE ARTHROSCOPY Right 2004   • SKIN CANCER EXCISION Left 12/2017     Family History   Problem Relation Age of Onset   • Heart attack Mother    • Heart disease Mother    • Diabetes Mother    • Kidney failure Mother    • Obesity Mother    • Kidney disease Father    • COPD Father    • Stroke Sister    • Hypertension Sister    • COPD Brother    • Heart disease Sister    • Heart attack Sister    • Heart failure Sister       reports that she has never smoked. She has never used smokeless tobacco. She reports that she does not drink alcohol or use illicit drugs.  Allergies   Allergen Reactions   • Penicillins Other (See Comments)     Unknown    • Clindamycin/Lincomycin Itching and Rash   • Sulfa Antibiotics Itching and Rash   • Vancomycin Itching and Rash           The following portions of the patient's history were reviewed and updated as appropriate: allergies, current medications, past family history, past medical history, past social history, past surgical history and problem list.    Review of Systems   Constitutional: Positive for fatigue. Negative for appetite change, chills, diaphoresis and unexpected weight change.   HENT:  "Negative for sore throat, trouble swallowing and voice change.    Eyes: Negative for visual disturbance.   Respiratory: Positive for shortness of breath. Negative for apnea, cough, choking and wheezing.    Cardiovascular: Negative for chest pain, palpitations and leg swelling.   Gastrointestinal: Negative for abdominal pain, constipation, diarrhea, nausea and vomiting.   Endocrine: Negative for cold intolerance, heat intolerance, polydipsia, polyphagia and polyuria.   Genitourinary: Negative for difficulty urinating and dysuria.   Musculoskeletal: Negative for gait problem.   Skin: Negative for rash and wound.   Neurological: Negative for syncope and light-headedness.   Hematological: Negative for adenopathy.   Psychiatric/Behavioral: Negative for agitation, behavioral problems and confusion.   All other systems reviewed and are negative.      Objective   /85  Pulse 71  Temp 98.1 °F (36.7 °C)  Ht 182.9 cm (72.01\")  Wt (!) 150 kg (331 lb 3.2 oz)  SpO2 96%  BMI 44.91 kg/m2  Physical Exam   Constitutional: She is oriented to person, place, and time.   HENT:   Head: Normocephalic and atraumatic.   Nose: Mucosal edema present.   Eyes: EOM are normal. Pupils are equal, round, and reactive to light.   Neck: Neck supple.   Cardiovascular: Normal rate, regular rhythm and normal heart sounds.    Pulmonary/Chest: She has rhonchi.   Vesicular breath sound bilaterally with prolonged expiratory phase   Abdominal: Soft. Bowel sounds are normal.   Musculoskeletal: Normal range of motion. She exhibits no deformity.   Neurological: She is alert and oriented to person, place, and time.   Skin: Skin is warm and dry.   Psychiatric: She has a normal mood and affect. Her behavior is normal.   Nursing note and vitals reviewed.        Radiology:  No Images in the past 120 days found..    Lab Results:  Hospital Outpatient Visit on 08/01/2017   Component Date Value Ref Range Status   • Nuclear Prior Study 08/01/2017 3   Final   • " Exercise duration (sec) 08/01/2017 45  sec Final   • Exercise duration (min) 08/01/2017 4  min Final   • Target HR (85%) 08/01/2017 142  bpm Final   • Max. Pred. HR (100%) 08/01/2017 167  bpm Final   • BH CV STRESS PROTOCOL 1 08/01/2017 Pharmacologic   Final   • Stage 1 08/01/2017 1   Final   • HR Stage 1 08/01/2017 112   Final   • BP Stage 1 08/01/2017 143/94   Final   • Duration Min Stage 1 08/01/2017 0   Final   • Duration Sec Stage 1 08/01/2017 15   Final   • Stress Dose Regadenoson Stage 1 08/01/2017 0.4   Final   • Stress Comments Stage 1 08/01/2017 15 sec bolus injection   Final   • Stage 2 08/01/2017 2   Final   • HR Stage 2 08/01/2017 129   Final   • BP Stage 2 08/01/2017 150/89   Final   • Duration Min Stage 2 08/01/2017 4   Final   • Duration Sec Stage 2 08/01/2017 0   Final   • Stress Comments Stage 2 08/01/2017 recovery   Final   • Baseline HR 08/01/2017 79  bpm Final   • Baseline BP 08/01/2017 143/100  mmHg Final   • Peak HR 08/01/2017 129  bpm Final   • Percent Max Pred HR 08/01/2017 77.25  % Final   • Percent Target HR 08/01/2017 91  % Final   • Peak BP 08/01/2017 159/110  mmHg Final   • Recovery HR 08/01/2017 91  bpm Final   • Recovery BP 08/01/2017 124/90  mmHg Final       Assessment      ICD-10-CM ICD-9-CM   1. SUSAN (obstructive sleep apnea) G47.33 327.23   2. Morbid obesity with BMI of 40.0-44.9, adult E66.01 278.01    Z68.41 V85.41   3. Restrictive ventilatory defect R94.2 794.2                DISCUSSION:  Patient claims that she is using her CPAP now on a nightly basis and remains compliant with that.  I have encouragedHer to remain compliant.  We also discussed about sleep hygiene.  She usually goes to bed at 11:30 and wakes up at 6:30.  I told her to increase her total time in bed for another hour which give her enough time to sleep.  Gentleman that she is trying to do that anyway.    I am giving her a supply for CPAP machine and would see her again in approximately 6 months from now.    Plan     Orders Placed This Encounter   Procedures   • Miscellaneous DME     No orders of the defined types were placed in this encounter.                 Nell Rangel MD, FCCP, FAASM  Pulmonary, Critical Care, and Sleep Medicine

## 2018-08-28 ENCOUNTER — OFFICE VISIT (OUTPATIENT)
Dept: CARDIOLOGY | Facility: CLINIC | Age: 53
End: 2018-08-28

## 2018-08-28 VITALS
HEART RATE: 72 BPM | OXYGEN SATURATION: 99 % | BODY MASS INDEX: 39.68 KG/M2 | SYSTOLIC BLOOD PRESSURE: 120 MMHG | WEIGHT: 293 LBS | RESPIRATION RATE: 18 BRPM | DIASTOLIC BLOOD PRESSURE: 78 MMHG | HEIGHT: 72 IN

## 2018-08-28 DIAGNOSIS — I10 ESSENTIAL HYPERTENSION: Primary | ICD-10-CM

## 2018-08-28 DIAGNOSIS — E66.01 OBESITY, CLASS III, BMI 40-49.9 (MORBID OBESITY) (HCC): ICD-10-CM

## 2018-08-28 DIAGNOSIS — R60.0 BILATERAL LEG EDEMA: ICD-10-CM

## 2018-08-28 PROCEDURE — 99213 OFFICE O/P EST LOW 20 MIN: CPT | Performed by: INTERNAL MEDICINE

## 2018-08-28 RX ORDER — ALLOPURINOL 100 MG/1
100 TABLET ORAL DAILY
Refills: 2 | COMMUNITY
Start: 2018-08-20

## 2018-08-28 RX ORDER — AMITRIPTYLINE HYDROCHLORIDE 10 MG/1
10 TABLET, FILM COATED ORAL DAILY
Refills: 0 | COMMUNITY
Start: 2018-07-19 | End: 2019-02-28 | Stop reason: ALTCHOICE

## 2018-08-28 RX ORDER — COLCHICINE 0.6 MG/1
0.6 TABLET ORAL 2 TIMES DAILY PRN
Refills: 0 | COMMUNITY
Start: 2018-07-20 | End: 2020-02-24

## 2018-08-28 NOTE — PROGRESS NOTES
subjective     Chief Complaint   Patient presents with   • Hypertension     History of Present Illness    Patient is 53 years old white female who is here for follow-up of hypertension and chest pain.  Patient had a stress test last year which was negative for significant exercise-induced myocardial ischemia.    She has been doing very well currently she is taking lisinopril 10 mg daily along with Lasix 20 mg every other day.  Blood pressure has been very well controlled.  She has had no ankle edema.  Patient states that she had an attack of gout and now she is taking Zyloprim    She has had no chest pain.  She is trying to lose weight and is quite a bit overweight.      Past Surgical History:   Procedure Laterality Date   • CARDIOVASCULAR STRESS TEST  08/02/2017   • ECHO - CONVERTED  07/05/2017   • KNEE ARTHROSCOPY Right 2004   • SKIN CANCER EXCISION Left 12/2017     Family History   Problem Relation Age of Onset   • Heart attack Mother    • Heart disease Mother    • Diabetes Mother    • Kidney failure Mother    • Obesity Mother    • Kidney disease Father    • COPD Father    • Stroke Sister    • Hypertension Sister    • COPD Brother    • Heart disease Sister    • Heart attack Sister    • Heart failure Sister      Past Medical History:   Diagnosis Date   • Emphysema, unspecified    • Hypertension      Patient Active Problem List   Diagnosis   • Chest pain in adult   • Essential hypertension   • Bilateral leg edema   • Obesity, Class III, BMI 40-49.9 (morbid obesity) (CMS/[facility])       Social History   Substance Use Topics   • Smoking status: Never Smoker   • Smokeless tobacco: Never Used   • Alcohol use No      Comment:  occ  3-4 times a year       Allergies   Allergen Reactions   • Penicillins Other (See Comments)     Unknown    • Clindamycin/Lincomycin Itching and Rash   • Sulfa Antibiotics Itching and Rash   • Vancomycin Itching and Rash       Current Outpatient Prescriptions on File Prior to Visit   Medication Sig  "  • albuterol (PROAIR HFA) 108 (90 Base) MCG/ACT inhaler Inhale 2 puffs Every 4 (Four) Hours As Needed for Shortness of Air.   • clopidogrel (PLAVIX) 75 MG tablet TAKE ONE TABLET BY MOUTH EVERY DAY   • furosemide (LASIX) 20 MG tablet PT TAKING EVERY OTHER DAY   • lisinopril (PRINIVIL,ZESTRIL) 10 MG tablet TAKE ONE TABLET BY MOUTH EVERY DAY     No current facility-administered medications on file prior to visit.          The following portions of the patient's history were reviewed and updated as appropriate: allergies, current medications, past family history, past medical history, past social history, past surgical history and problem list.    Review of Systems   Constitution: Negative.   HENT: Negative.  Negative for congestion.    Eyes: Negative.    Cardiovascular: Negative.  Negative for chest pain, cyanosis, dyspnea on exertion, irregular heartbeat, leg swelling, near-syncope, orthopnea, palpitations, paroxysmal nocturnal dyspnea and syncope.   Respiratory: Negative.  Negative for shortness of breath.    Hematologic/Lymphatic: Negative.    Musculoskeletal: Positive for arthritis.   Gastrointestinal: Negative.    Neurological: Negative.  Negative for headaches.          Objective:     /78 (BP Location: Left arm, Patient Position: Sitting, Cuff Size: Adult)   Pulse 72   Resp 18   Ht 182.9 cm (72.01\")   Wt (!) 148 kg (327 lb)   SpO2 99%   BMI 44.34 kg/m²   Physical Exam   Constitutional: She appears well-developed and well-nourished. No distress.   HENT:   Head: Normocephalic and atraumatic.   Mouth/Throat: Oropharynx is clear and moist. No oropharyngeal exudate.   Eyes: Pupils are equal, round, and reactive to light. Conjunctivae and EOM are normal. No scleral icterus.   Neck: Normal range of motion. Neck supple. No JVD present. No tracheal deviation present. No thyromegaly present.   Cardiovascular: Normal rate, regular rhythm, normal heart sounds and intact distal pulses.  PMI is not displaced.  " Exam reveals no gallop, no friction rub and no decreased pulses.    No murmur heard.  Pulses:       Carotid pulses are 3+ on the right side, and 3+ on the left side.       Radial pulses are 3+ on the right side, and 3+ on the left side.   Pulmonary/Chest: Effort normal and breath sounds normal. No respiratory distress. She has no wheezes. She has no rales. She exhibits no tenderness.   Abdominal: Soft. Bowel sounds are normal. She exhibits no distension, no abdominal bruit and no mass. There is no splenomegaly or hepatomegaly. There is no tenderness. There is no rebound and no guarding.   Musculoskeletal: Normal range of motion. She exhibits no edema, tenderness or deformity.   Lymphadenopathy:     She has no cervical adenopathy.   Neurological: She is alert. She has normal reflexes. No cranial nerve deficit. She exhibits normal muscle tone. Coordination normal.   Skin: Skin is warm and dry. No rash noted. She is not diaphoretic. No erythema.   Psychiatric: She has a normal mood and affect. Her behavior is normal. Judgment and thought content normal.         Lab Review    Procedures       I personally viewed and interpreted the patient's LAB data         Assessment:     1. Essential hypertension    2. Obesity, Class III, BMI 40-49.9 (morbid obesity) (CMS/HCC)    3. Bilateral leg edema          Plan:      Patient is doing very well from cardiac standpoint.  Her chest pain was atypical with normal stress test.  Lately she has not been having any cardiac symptoms.  Blood pressure is very well controlled on current medications.  Ankle edema is also completely gone.  Patient has developed hyperuricemia .  She is doing very well with allopurinol and takes colchicine on when necessary basis.  Renal functions according to her are normal.  She will follow closely with her PCP and will be seen in 6 months from cardiac standpoint      Return in about 6 months (around 2/28/2019).

## 2018-09-12 ENCOUNTER — OFFICE VISIT (OUTPATIENT)
Dept: PULMONOLOGY | Facility: CLINIC | Age: 53
End: 2018-09-12

## 2018-09-12 VITALS
BODY MASS INDEX: 39.68 KG/M2 | SYSTOLIC BLOOD PRESSURE: 129 MMHG | OXYGEN SATURATION: 98 % | HEIGHT: 72 IN | DIASTOLIC BLOOD PRESSURE: 87 MMHG | WEIGHT: 293 LBS | TEMPERATURE: 97.7 F | HEART RATE: 69 BPM

## 2018-09-12 DIAGNOSIS — E66.01 MORBID OBESITY WITH BMI OF 45.0-49.9, ADULT (HCC): ICD-10-CM

## 2018-09-12 DIAGNOSIS — G47.33 OSA (OBSTRUCTIVE SLEEP APNEA): Primary | ICD-10-CM

## 2018-09-12 DIAGNOSIS — R94.2 RESTRICTIVE VENTILATORY DEFECT: ICD-10-CM

## 2018-09-12 PROCEDURE — 99214 OFFICE O/P EST MOD 30 MIN: CPT | Performed by: INTERNAL MEDICINE

## 2018-09-12 NOTE — PROGRESS NOTES
Subjective   Coni Tubbs is a 53 y.o. female who is being seen for Sleep Apnea    History of Present Illness   She returns for follow-up for sleep apnea.  She is using her pressure device in a nightly basis but tells me that her symptoms started coming back.  Some mornings she wakes up unrefreshed and has headache in the morning time.  Her fatigue is also back and some days she has to take naps.  Past Medical History:   Diagnosis Date   • Emphysema, unspecified    • Hypertension      Past Surgical History:   Procedure Laterality Date   • CARDIOVASCULAR STRESS TEST  08/02/2017   • ECHO - CONVERTED  07/05/2017   • KNEE ARTHROSCOPY Right 2004   • SKIN CANCER EXCISION Left 12/2017     Family History   Problem Relation Age of Onset   • Heart attack Mother    • Heart disease Mother    • Diabetes Mother    • Kidney failure Mother    • Obesity Mother    • Kidney disease Father    • COPD Father    • Stroke Sister    • Hypertension Sister    • COPD Brother    • Heart disease Sister    • Heart attack Sister    • Heart failure Sister       reports that she has never smoked. She has never used smokeless tobacco. She reports that she does not drink alcohol or use drugs.  Allergies   Allergen Reactions   • Penicillins Other (See Comments)     Unknown    • Clindamycin/Lincomycin Itching and Rash   • Sulfa Antibiotics Itching and Rash   • Vancomycin Itching and Rash           Patient has never received a flu shot or a pneumonia vaccination.     The following portions of the patient's history were reviewed and updated as appropriate: allergies, current medications, past family history, past medical history, past social history, past surgical history and problem list.    Review of Systems   Constitutional: Negative for appetite change, chills, diaphoresis and unexpected weight change.   HENT: Positive for postnasal drip, sinus pain and sinus pressure. Negative for sore throat, trouble swallowing and voice change.    Eyes: Negative  "for visual disturbance.   Respiratory: Positive for cough (Dry) and shortness of breath. Negative for apnea, choking and wheezing.    Cardiovascular: Negative for chest pain, palpitations and leg swelling.   Gastrointestinal: Negative for abdominal pain, constipation, diarrhea, nausea and vomiting.   Endocrine: Negative for cold intolerance, heat intolerance, polydipsia, polyphagia and polyuria.   Genitourinary: Negative for difficulty urinating and dysuria.   Musculoskeletal: Negative for gait problem.   Skin: Negative for rash and wound.   Neurological: Negative for syncope and light-headedness.   Hematological: Negative for adenopathy.   Psychiatric/Behavioral: Negative for agitation, behavioral problems and confusion.   All other systems reviewed and are negative.      Objective   /87   Pulse 69   Temp 97.7 °F (36.5 °C) (Oral)   Ht 182.9 cm (72\")   Wt (!) 151 kg (333 lb)   SpO2 98%   BMI 45.16 kg/m²   Physical Exam   Constitutional: She is oriented to person, place, and time.   HENT:   Head: Normocephalic and atraumatic.   Nose: Mucosal edema present.   Eyes: Pupils are equal, round, and reactive to light. EOM are normal.   Neck: Neck supple.   Cardiovascular: Normal rate, regular rhythm and normal heart sounds.    Pulmonary/Chest: She has rhonchi.   Vesicular breath sound bilaterally with prolonged expiratory phase   Abdominal: Soft. Bowel sounds are normal.   Musculoskeletal: Normal range of motion. She exhibits no deformity.   Neurological: She is alert and oriented to person, place, and time.   Skin: Skin is warm and dry.   Psychiatric: She has a normal mood and affect. Her behavior is normal.   Nursing note and vitals reviewed.        Radiology:  No Images in the past 120 days found..    Lab Results:  Hospital Outpatient Visit on 08/01/2017   Component Date Value Ref Range Status   • Nuclear Prior Study 08/01/2017 3   Final   • Exercise duration (sec) 08/01/2017 45  sec Final   • Exercise " duration (min) 08/01/2017 4  min Final   • Target HR (85%) 08/01/2017 142  bpm Final   • Max. Pred. HR (100%) 08/01/2017 167  bpm Final   • BH CV STRESS PROTOCOL 1 08/01/2017 Pharmacologic   Final   • Stage 1 08/01/2017 1   Final   • HR Stage 1 08/01/2017 112   Final   • BP Stage 1 08/01/2017 143/94   Final   • Duration Min Stage 1 08/01/2017 0   Final   • Duration Sec Stage 1 08/01/2017 15   Final   • Stress Dose Regadenoson Stage 1 08/01/2017 0.4   Final   • Stress Comments Stage 1 08/01/2017 15 sec bolus injection   Final   • Stage 2 08/01/2017 2   Final   • HR Stage 2 08/01/2017 129   Final   • BP Stage 2 08/01/2017 150/89   Final   • Duration Min Stage 2 08/01/2017 4   Final   • Duration Sec Stage 2 08/01/2017 0   Final   • Stress Comments Stage 2 08/01/2017 recovery   Final   • Baseline HR 08/01/2017 79  bpm Final   • Baseline BP 08/01/2017 143/100  mmHg Final   • Peak HR 08/01/2017 129  bpm Final   • Percent Max Pred HR 08/01/2017 77.25  % Final   • Percent Target HR 08/01/2017 91  % Final   • Peak BP 08/01/2017 159/110  mmHg Final   • Recovery HR 08/01/2017 91  bpm Final   • Recovery BP 08/01/2017 124/90  mmHg Final       Assessment      ICD-10-CM ICD-9-CM   1. SUSAN (obstructive sleep apnea) G47.33 327.23   2. Morbid obesity with BMI of 45.0-49.9, adult (CMS/[facility]) E66.01 278.01    Z68.42 V85.42   3. Restrictive ventilatory defect R94.2 794.2                DISCUSSION:  This patient's daytime symptoms of increase fatigue as well as sleepiness is coming back again despite the fact that she is using her pressure device on a nightly basis.  I'm wondering if her pressure requirement has changed.  Currently she is on AutoPap with 5-15 cm water which was given on an empiric basis.  Increasing her pressure 6-18 cm water) lever her again in approximately 3 months from now.  If she still continues to have symptoms then she would need a formal pressure titration study.    Plan    Orders Placed This Encounter   Procedures    • Miscellaneous DME     No orders of the defined types were placed in this encounter.                 Nell Rangel MD, FCCP, FAASM  Pulmonary, Critical Care, and Sleep Medicine

## 2019-02-28 ENCOUNTER — OFFICE VISIT (OUTPATIENT)
Dept: CARDIOLOGY | Facility: CLINIC | Age: 54
End: 2019-02-28

## 2019-02-28 VITALS
HEART RATE: 58 BPM | WEIGHT: 293 LBS | BODY MASS INDEX: 39.68 KG/M2 | OXYGEN SATURATION: 98 % | DIASTOLIC BLOOD PRESSURE: 76 MMHG | SYSTOLIC BLOOD PRESSURE: 122 MMHG | HEIGHT: 72 IN

## 2019-02-28 DIAGNOSIS — E66.01 OBESITY, CLASS III, BMI 40-49.9 (MORBID OBESITY) (HCC): ICD-10-CM

## 2019-02-28 DIAGNOSIS — I10 ESSENTIAL HYPERTENSION: Primary | ICD-10-CM

## 2019-02-28 DIAGNOSIS — R06.09 DOE (DYSPNEA ON EXERTION): ICD-10-CM

## 2019-02-28 DIAGNOSIS — R60.0 BILATERAL LEG EDEMA: ICD-10-CM

## 2019-02-28 PROCEDURE — 99213 OFFICE O/P EST LOW 20 MIN: CPT | Performed by: INTERNAL MEDICINE

## 2019-02-28 PROCEDURE — 93000 ELECTROCARDIOGRAM COMPLETE: CPT | Performed by: INTERNAL MEDICINE

## 2019-03-02 PROBLEM — G47.33 OBSTRUCTIVE SLEEP APNEA: Status: ACTIVE | Noted: 2019-03-02

## 2019-03-02 PROBLEM — M10.9 GOUTY ARTHRITIS: Status: ACTIVE | Noted: 2019-03-02

## 2019-03-15 ENCOUNTER — OFFICE VISIT (OUTPATIENT)
Dept: PULMONOLOGY | Facility: CLINIC | Age: 54
End: 2019-03-15

## 2019-03-15 VITALS
HEIGHT: 72 IN | SYSTOLIC BLOOD PRESSURE: 140 MMHG | TEMPERATURE: 98 F | DIASTOLIC BLOOD PRESSURE: 91 MMHG | BODY MASS INDEX: 39.68 KG/M2 | OXYGEN SATURATION: 97 % | WEIGHT: 293 LBS | HEART RATE: 90 BPM

## 2019-03-15 DIAGNOSIS — G47.33 OSA (OBSTRUCTIVE SLEEP APNEA): Primary | ICD-10-CM

## 2019-03-15 DIAGNOSIS — E66.2 OBESITY HYPOVENTILATION SYNDROME (HCC): ICD-10-CM

## 2019-03-15 PROCEDURE — 99214 OFFICE O/P EST MOD 30 MIN: CPT | Performed by: INTERNAL MEDICINE

## 2019-03-15 NOTE — PROGRESS NOTES
Interval history since last visit:    Recent hospitalizations:    Investigations (imaging, PFT's, labs, sleep study, record requests, etc.)    Have you had the Influenza Vaccine? No  Would you like to receive this Vaccine today? No     Have you had the Pneumonia Vaccine? No   Would you like to receive this Vaccine today? No    Subjective    Coni Tubbs presents for the following Sleep Apnea  .    History of Present Illness   Ms. Albrecht is a 49-year-old female with past medical history of hypertension, obstructive sleep apnea and obesity class III.  Her current medication list include allopurinol, Plavix, colchicine, Lasix and lisinopril with as needed albuterol.  She presents to pulmonary clinic as a regular follow-up for the management of her obstructive sleep apnea/obesity hypoventilation syndrome.    She denies any shortness of breath at rest or on exertion.  She denies any chest pain, wheezing and coughing.  She denies any symptoms of fever or chills.  She denies any discomfort on laying down flat or any attacks of severe shortness of breath and coughing at nighttime.  She denies any night sweats or weight loss.  She denies any history of blood clot in the leg or in the lung.  She is currently on AutoPap.  Per her she has not used AutoPap and last 2 months after an episode of upper respiratory tract viral infection.  She does complain of morning headaches and insufficient sleep.  She sleeps only 4-5 hours at night.  She complains of early awakening and difficulty going back to sleep.  She takes Lasix at nighttime.  I advised her to take Lasix in the evening so that it does not affect your sleep.  She also complains about misfit face mask.      Review of Systems - History obtained from chart review and the patient  General ROS: negative for - chills, fatigue, fever, malaise, night sweats or sleep disturbance  Psychological ROS: negative for - anxiety, behavioral disorder, depression or memory  difficulties  Ophthalmic ROS: negative for - blurry vision, decreased vision, double vision or dry eyes  ENT ROS: negative for - epistaxis, hearing change or sneezing  Allergy and Immunology ROS: negative for - hives, itchy/watery eyes or nasal congestion  Hematological and Lymphatic ROS: negative for - bleeding problems, blood clots, blood transfusions, jaundice or night sweats  Endocrine ROS: negative for - malaise/lethargy, mood swings, polydipsia/polyuria or temperature intolerance  Respiratory ROS: negative for - cough, shortness of breath and wheezing  negative for - orthopnea, pleuritic pain or sputum changes  Cardiovascular ROS: no chest pain or dyspnea on exertion  Gastrointestinal ROS: no abdominal pain, change in bowel habits, or black or bloody stools  Musculoskeletal ROS: negative for - joint pain, joint stiffness or joint swelling  Neurological ROS: no TIA or stroke symptoms  Dermatological ROS: negative for acne, dry skin and eczema    Active Problems:  Problem List Items Addressed This Visit     None      Visit Diagnoses     SUSAN (obstructive sleep apnea)    -  Primary    Relevant Orders    Polysomnography 4 or More Parameters With CPAP    Obesity hypoventilation syndrome (CMS/HCC)              Past Medical History:  Past Medical History:   Diagnosis Date   • Emphysema, unspecified (CMS/HCC)    • Hypertension        Family History:  Family History   Problem Relation Age of Onset   • Heart attack Mother    • Heart disease Mother    • Diabetes Mother    • Kidney failure Mother    • Obesity Mother    • Kidney disease Father    • COPD Father    • Stroke Sister    • Hypertension Sister    • COPD Brother    • Heart disease Sister    • Heart attack Sister    • Heart failure Sister        Social History:  Social History     Tobacco Use   • Smoking status: Never Smoker   • Smokeless tobacco: Never Used   Substance Use Topics   • Alcohol use: No     Comment:  occ  3-4 times a year       Current  "Medications:  Current Outpatient Medications   Medication Sig Dispense Refill   • albuterol (PROAIR HFA) 108 (90 Base) MCG/ACT inhaler Inhale 2 puffs Every 4 (Four) Hours As Needed for Shortness of Air. 1 inhaler 11   • allopurinol (ZYLOPRIM) 100 MG tablet Take 100 mg by mouth Daily.  2   • clopidogrel (PLAVIX) 75 MG tablet TAKE ONE TABLET BY MOUTH EVERY DAY  5   • colchicine 0.6 MG tablet Take 0.6 mg by mouth 2 (Two) Times a Day.  0   • furosemide (LASIX) 20 MG tablet PT TAKING EVERY OTHER DAY  1   • lisinopril (PRINIVIL,ZESTRIL) 10 MG tablet TAKE ONE TABLET BY MOUTH EVERY DAY  6     No current facility-administered medications for this visit.        Allergies:  Allergies   Allergen Reactions   • Penicillins Other (See Comments)     Unknown    • Clindamycin/Lincomycin Itching and Rash   • Sulfa Antibiotics Itching and Rash   • Vancomycin Itching and Rash       Vitals:  /91   Pulse 90   Temp 98 °F (36.7 °C)   Ht 182.9 cm (72\")   Wt (!) 149 kg (328 lb)   SpO2 97%   BMI 44.48 kg/m²     Imaging:    Imaging Results (most recent)     None          Pulmonary Functions Testing Results:    No results found for: FEV1, FVC, UDQ5NNP, TLC, DLCO    Results for orders placed or performed during the hospital encounter of 08/01/17   Stress Test With Myocardial Perfusion One Day   Result Value Ref Range    Nuclear Prior Study 3     Exercise duration (sec) 45 sec    Exercise duration (min) 4 min    Target HR (85%) 142 bpm    Max. Pred. HR (100%) 167 bpm    BH CV STRESS PROTOCOL 1 Pharmacologic     Stage 1 1     HR Stage 1 112     BP Stage 1 143/94     Duration Min Stage 1 0     Duration Sec Stage 1 15     Stress Dose Regadenoson Stage 1 0.4     Stress Comments Stage 1 15 sec bolus injection     Stage 2 2     HR Stage 2 129     BP Stage 2 150/89     Duration Min Stage 2 4     Duration Sec Stage 2 0     Stress Comments Stage 2 recovery     Baseline HR 79 bpm    Baseline /100 mmHg    Peak  bpm    Percent Max Pred " HR 77.25 %    Percent Target HR 91 %    Peak /110 mmHg    Recovery HR 91 bpm    Recovery /90 mmHg       Objective   Physical Exam  Physical Exam:  Constitutional:  oriented to person, place, and time. appears well-developed and well-nourished. No distress.   HENT:   Head: Normocephalic and atraumatic.   Right Ear: External ear normal.   Left Ear: External ear normal.   Nose: Nose normal.   Eyes: Pupils are equal, round, and reactive to light. Conjunctivae and EOM are normal. Right eye exhibits no discharge. Left eye exhibits no discharge. No scleral icterus.   Neck: Normal range of motion. Neck supple. No thyromegaly present.   Cardiovascular: Normal rate, regular rhythm, normal heart sounds and intact distal pulses.  Exam reveals no friction rub.    No murmur heard.  Pulmonary/Chest: No stridor.   Bilateral air entry equal.  Decreased breath sounds in left and right posterior lower lung zones.  No rhonchi heard.  Wheezing absent.  No crackles appreciated.    Abdominal: Soft. Bowel sounds are normal.exhibits no distension. There is no tenderness.   Musculoskeletal: Normal range of motion. exhibits no deformity.   +1 lower extremity edema bilateral.   Neurological: alert and oriented to person, place, and time. No cranial nerve deficit. Coordination normal.   Skin: Skin is warm and dry. Capillary refill takes less than 2 seconds. not diaphoretic. No erythema.   Psychiatric:   normal mood and affect.   behavior is normal.     Assessment/Plan   I have reviewed the old records.  In summary, CT scan of the chest done in 2017 showed no evidence of abnormal interstitial lung disease, calcified granuloma in right lung with mild cardiomegaly with moderate hiatal hernia.    I have reviewed pulmonary function test..  FEV1/FVC ratio is 89.  There was no significant bronchodilator response.  RV/TLC ratio is increased.  Total lung capacity is moderately decreased with normal diffusing capacity.    I have reviewed the  echo report that was done in 2017 which showed normal left ventricle systolic function with no evidence of pericardial effusion with no significant valvular heart disease.      ICD-10-CM ICD-9-CM   1. SUSAN (obstructive sleep apnea)  -Patient is currently on AutoPap.   -Ordered sleep titration study  - Patient was educated on positive airway pressure treatment.  As per CMS guidelines, more than 4 hours on 70% of observed nights is considered adherence. Patient was strongly encouraged to use CPAP as much as possible during sleep as more CPAP use is equal to more benefit.  The patient was extensively educated on the consequences of untreated obstructive sleep apnea namely cardiovascular/metabolic disorder, neurocognitive deficit, daytime sleepiness, motor vehicle accidents, depression, mood disorders and reduced quality of life.  At the end of conversation, the patient voices understanding of the disease process and treatment modality.  Patient also understands the risk of untreated obstructive sleep apnea and benefit benefits of the treatment.       G47.33 327.23   2. Obesity hypoventilation syndrome (CMS/HCC)  -Patient is currently on AutoPap.  Ordered sleep titration study. E66.2 278.03     Patient's Body mass index is 44.48 kg/m². BMI is above normal parameters. Recommendations include: educational material and exercise counseling.    Follow-up in 6 months.

## 2019-04-30 ENCOUNTER — TRANSCRIBE ORDERS (OUTPATIENT)
Dept: PULMONOLOGY | Facility: CLINIC | Age: 54
End: 2019-04-30

## 2019-04-30 DIAGNOSIS — G47.33 OBSTRUCTIVE SLEEP APNEA: Primary | ICD-10-CM

## 2019-08-27 ENCOUNTER — OFFICE VISIT (OUTPATIENT)
Dept: CARDIOLOGY | Facility: CLINIC | Age: 54
End: 2019-08-27

## 2019-08-27 VITALS
BODY MASS INDEX: 39.68 KG/M2 | SYSTOLIC BLOOD PRESSURE: 138 MMHG | WEIGHT: 293 LBS | OXYGEN SATURATION: 96 % | HEART RATE: 68 BPM | RESPIRATION RATE: 16 BRPM | HEIGHT: 72 IN | DIASTOLIC BLOOD PRESSURE: 86 MMHG

## 2019-08-27 DIAGNOSIS — E66.01 OBESITY, CLASS III, BMI 40-49.9 (MORBID OBESITY) (HCC): ICD-10-CM

## 2019-08-27 DIAGNOSIS — I10 ESSENTIAL HYPERTENSION: Primary | ICD-10-CM

## 2019-08-27 PROCEDURE — 99213 OFFICE O/P EST LOW 20 MIN: CPT | Performed by: INTERNAL MEDICINE

## 2019-08-27 RX ORDER — MELATONIN
1000 DAILY
COMMUNITY

## 2019-08-27 NOTE — PROGRESS NOTES
subjective     Chief Complaint   Patient presents with   • Hypertension     History of Present Illness  Patient is 54 years old white female who is here for management of hypertension.  Her blood pressure has been difficult to control but is doing very well now.  She is taking lisinopril 10 mg daily.  Weight.  She denies any chest pain palpitations or shortness of breath.    Past Surgical History:   Procedure Laterality Date   • CARDIOVASCULAR STRESS TEST  08/02/2017   • ECHO - CONVERTED  07/05/2017   • KNEE ARTHROSCOPY Right 2004   • SKIN CANCER EXCISION Left 12/2017     Family History   Problem Relation Age of Onset   • Heart attack Mother    • Heart disease Mother    • Diabetes Mother    • Kidney failure Mother    • Obesity Mother    • Kidney disease Father    • COPD Father    • Stroke Sister    • Hypertension Sister    • COPD Brother    • Heart disease Sister    • Heart attack Sister    • Heart failure Sister      Past Medical History:   Diagnosis Date   • Emphysema, unspecified (CMS/[facility])    • Hypertension      Patient Active Problem List   Diagnosis   • Chest pain in adult   • Essential hypertension   • Bilateral leg edema   • Obesity, Class III, BMI 40-49.9 (morbid obesity) (CMS/[facility])   • Gouty arthritis   • Obstructive sleep apnea       Social History     Tobacco Use   • Smoking status: Never Smoker   • Smokeless tobacco: Never Used   Substance Use Topics   • Alcohol use: No     Comment:  occ  3-4 times a year   • Drug use: No       Allergies   Allergen Reactions   • Penicillins Other (See Comments)     Unknown    • Clindamycin/Lincomycin Itching and Rash   • Sulfa Antibiotics Itching and Rash   • Vancomycin Itching and Rash       Current Outpatient Medications on File Prior to Visit   Medication Sig   • albuterol (PROAIR HFA) 108 (90 Base) MCG/ACT inhaler Inhale 2 puffs Every 4 (Four) Hours As Needed for Shortness of Air.   • allopurinol (ZYLOPRIM) 100 MG tablet Take 100 mg by mouth Daily.   • cholecalciferol  "(VITAMIN D3) 1000 units tablet Take 1,000 Units by mouth Daily.   • clopidogrel (PLAVIX) 75 MG tablet TAKE ONE TABLET BY MOUTH EVERY DAY   • colchicine 0.6 MG tablet Take 0.6 mg by mouth 2 (Two) Times a Day As Needed.   • lisinopril (PRINIVIL,ZESTRIL) 10 MG tablet TAKE ONE TABLET BY MOUTH EVERY DAY   • metFORMIN (GLUCOPHAGE) 500 MG tablet Take 500 mg by mouth 2 (Two) Times a Day With Meals.   • [DISCONTINUED] furosemide (LASIX) 20 MG tablet PT TAKING EVERY OTHER DAY     No current facility-administered medications on file prior to visit.          The following portions of the patient's history were reviewed and updated as appropriate: allergies, current medications, past family history, past medical history, past social history, past surgical history and problem list.    Review of Systems   Constitution: Negative.   HENT: Negative.  Negative for congestion.    Eyes: Negative.    Cardiovascular: Negative.  Negative for chest pain, cyanosis, dyspnea on exertion, irregular heartbeat, leg swelling, near-syncope, orthopnea, palpitations, paroxysmal nocturnal dyspnea and syncope.   Respiratory: Negative.  Negative for shortness of breath.    Hematologic/Lymphatic: Negative.    Musculoskeletal: Negative.    Gastrointestinal: Negative.    Neurological: Negative.  Negative for headaches.          Objective:     /86 (BP Location: Left arm)   Pulse 68   Resp 16   Ht 182.6 cm (71.89\")   Wt (!) 150 kg (330 lb)   SpO2 96%   BMI 44.89 kg/m²   Physical Exam   Constitutional: She appears well-developed and well-nourished. No distress.   HENT:   Head: Normocephalic and atraumatic.   Mouth/Throat: Oropharynx is clear and moist. No oropharyngeal exudate.   Eyes: Conjunctivae and EOM are normal. Pupils are equal, round, and reactive to light. No scleral icterus.   Neck: Normal range of motion. Neck supple. No JVD present. No tracheal deviation present. No thyromegaly present.   Cardiovascular: Normal rate, regular rhythm, " normal heart sounds and intact distal pulses. PMI is not displaced. Exam reveals no gallop, no friction rub and no decreased pulses.   No murmur heard.  Pulses:       Carotid pulses are 3+ on the right side, and 3+ on the left side.       Radial pulses are 3+ on the right side, and 3+ on the left side.   Pulmonary/Chest: Effort normal and breath sounds normal. No respiratory distress. She has no wheezes. She has no rales. She exhibits no tenderness.   Abdominal: Soft. Bowel sounds are normal. She exhibits no distension, no abdominal bruit and no mass. There is no splenomegaly or hepatomegaly. There is no tenderness. There is no rebound and no guarding.   Musculoskeletal: Normal range of motion. She exhibits no edema, tenderness or deformity.   Lymphadenopathy:     She has no cervical adenopathy.   Neurological: She is alert. She has normal reflexes. No cranial nerve deficit. She exhibits normal muscle tone. Coordination normal.   Skin: Skin is warm and dry. No rash noted. She is not diaphoretic. No erythema.   Psychiatric: She has a normal mood and affect. Her behavior is normal. Judgment and thought content normal.         Lab Review  No lab work available this visit  Procedures               Assessment:     1. Essential hypertension    2. Obesity, Class III, BMI 40-49.9 (morbid obesity) (CMS/HCC)          Plan:     Patient was advised to continue lisinopril 10 mg daily.  Salt restrictions were discussed again.  Healthy lifestyle and weight loss was emphasized.  Patient will follow closely with her PCP.    No follow-up appointment was given.        No Follow-up on file.

## 2020-02-24 ENCOUNTER — OFFICE VISIT (OUTPATIENT)
Dept: CARDIOLOGY | Facility: CLINIC | Age: 55
End: 2020-02-24

## 2020-02-24 VITALS
DIASTOLIC BLOOD PRESSURE: 82 MMHG | WEIGHT: 293 LBS | HEART RATE: 72 BPM | BODY MASS INDEX: 39.68 KG/M2 | SYSTOLIC BLOOD PRESSURE: 116 MMHG | OXYGEN SATURATION: 99 % | HEIGHT: 72 IN

## 2020-02-24 DIAGNOSIS — I10 ESSENTIAL HYPERTENSION: Primary | ICD-10-CM

## 2020-02-24 DIAGNOSIS — R60.0 BILATERAL LEG EDEMA: ICD-10-CM

## 2020-02-24 DIAGNOSIS — R06.09 DOE (DYSPNEA ON EXERTION): ICD-10-CM

## 2020-02-24 DIAGNOSIS — E66.01 OBESITY, CLASS III, BMI 40-49.9 (MORBID OBESITY) (HCC): ICD-10-CM

## 2020-02-24 PROCEDURE — 93000 ELECTROCARDIOGRAM COMPLETE: CPT | Performed by: INTERNAL MEDICINE

## 2020-02-24 PROCEDURE — 99213 OFFICE O/P EST LOW 20 MIN: CPT | Performed by: INTERNAL MEDICINE

## 2020-02-24 RX ORDER — GABAPENTIN 300 MG/1
300 CAPSULE ORAL 2 TIMES DAILY
COMMUNITY
Start: 2020-01-28 | End: 2021-02-22 | Stop reason: DRUGHIGH

## 2020-02-24 NOTE — PROGRESS NOTES
"subjective     Chief Complaint   Patient presents with   • Hypertension   • Chest Pain     \"States per anxiety\"     History of Present Illness  Patient is 54 years old white female who is here for cardiology follow-up.  Patient is doing fairly well, blood pressure is very well controlled.  She is taking lisinopril 10 mg daily  Patient is also trying to lose weight and diet.  Patient recently has been started on Neurontin which is helping back pain and leg pain and is also helping her sleep.  She denies any chest tightness or pressure sensation however she complains of being short of breath..  Patient has obstructive sleep apnea  Past Surgical History:   Procedure Laterality Date   • CARDIOVASCULAR STRESS TEST  08/02/2017   • ECHO - CONVERTED  07/05/2017   • KNEE ARTHROSCOPY Right 2004   • SKIN CANCER EXCISION Left 12/2017     Family History   Problem Relation Age of Onset   • Heart attack Mother    • Heart disease Mother    • Diabetes Mother    • Kidney failure Mother    • Obesity Mother    • Kidney disease Father    • COPD Father    • Stroke Sister    • Hypertension Sister    • COPD Brother    • Heart disease Sister    • Heart attack Sister    • Heart failure Sister      Past Medical History:   Diagnosis Date   • Emphysema, unspecified (CMS/HCC)    • Hypertension      Patient Active Problem List   Diagnosis   • Chest pain in adult   • Essential hypertension   • Bilateral leg edema   • Obesity, Class III, BMI 40-49.9 (morbid obesity) (CMS/HCC)   • Gouty arthritis   • Obstructive sleep apnea   • MORENO (dyspnea on exertion)       Social History     Tobacco Use   • Smoking status: Never Smoker   • Smokeless tobacco: Never Used   Substance Use Topics   • Alcohol use: No     Comment:  occ  3-4 times a year   • Drug use: No       Allergies   Allergen Reactions   • Penicillins Other (See Comments)     Unknown    • Clindamycin/Lincomycin Itching and Rash   • Sulfa Antibiotics Itching and Rash   • Vancomycin Itching and Rash " "      Current Outpatient Medications on File Prior to Visit   Medication Sig   • albuterol (PROAIR HFA) 108 (90 Base) MCG/ACT inhaler Inhale 2 puffs Every 4 (Four) Hours As Needed for Shortness of Air.   • allopurinol (ZYLOPRIM) 100 MG tablet Take 100 mg by mouth Daily.   • cholecalciferol (VITAMIN D3) 1000 units tablet Take 1,000 Units by mouth Daily.   • clopidogrel (PLAVIX) 75 MG tablet TAKE ONE TABLET BY MOUTH EVERY DAY   • gabapentin (NEURONTIN) 300 MG capsule Take 300 mg by mouth 2 (Two) Times a Day.   • lisinopril (PRINIVIL,ZESTRIL) 10 MG tablet TAKE ONE TABLET BY MOUTH EVERY DAY   • metFORMIN (GLUCOPHAGE) 500 MG tablet Take 500 mg by mouth 2 (Two) Times a Day With Meals.   • [DISCONTINUED] colchicine 0.6 MG tablet Take 0.6 mg by mouth 2 (Two) Times a Day As Needed.     No current facility-administered medications on file prior to visit.          The following portions of the patient's history were reviewed and updated as appropriate: allergies, current medications, past family history, past medical history, past social history, past surgical history and problem list.    Review of Systems   Constitution: Negative.   HENT: Negative.  Negative for congestion.    Eyes: Negative.    Cardiovascular: Positive for dyspnea on exertion. Negative for chest pain, cyanosis, irregular heartbeat, leg swelling, near-syncope, orthopnea, palpitations, paroxysmal nocturnal dyspnea and syncope.   Respiratory: Positive for shortness of breath.    Hematologic/Lymphatic: Negative.    Musculoskeletal: Positive for back pain and myalgias.   Gastrointestinal: Negative.    Neurological: Negative.  Negative for headaches.          Objective:     /82 (BP Location: Left arm, Patient Position: Sitting, Cuff Size: Large Adult)   Pulse 72   Ht 182.9 cm (72\")   Wt (!) 145 kg (319 lb)   SpO2 99%   BMI 43.26 kg/m²   Physical Exam   Constitutional: She appears well-developed and well-nourished. No distress.   HENT:   Head: " Normocephalic and atraumatic.   Mouth/Throat: Oropharynx is clear and moist. No oropharyngeal exudate.   Eyes: Pupils are equal, round, and reactive to light. Conjunctivae and EOM are normal. No scleral icterus.   Neck: Normal range of motion. Neck supple. No JVD present. No tracheal deviation present. No thyromegaly present.   Cardiovascular: Normal rate, regular rhythm, normal heart sounds and intact distal pulses. PMI is not displaced. Exam reveals no gallop, no friction rub and no decreased pulses.   No murmur heard.  Pulses:       Carotid pulses are 3+ on the right side, and 3+ on the left side.       Radial pulses are 3+ on the right side, and 3+ on the left side.   Pulmonary/Chest: Effort normal and breath sounds normal. No respiratory distress. She has no wheezes. She has no rales. She exhibits no tenderness.   Abdominal: Soft. Bowel sounds are normal. She exhibits no distension, no abdominal bruit and no mass. There is no splenomegaly or hepatomegaly. There is no tenderness. There is no rebound and no guarding.   Musculoskeletal: Normal range of motion. She exhibits no edema, tenderness or deformity.   Lymphadenopathy:     She has no cervical adenopathy.   Neurological: She is alert. She has normal reflexes. No cranial nerve deficit. She exhibits normal muscle tone. Coordination normal.   Skin: Skin is warm and dry. No rash noted. She is not diaphoretic. No erythema.   Psychiatric: She has a normal mood and affect. Her behavior is normal. Judgment and thought content normal.         Lab Review    No recent lab work available.    ECG 12 Lead  Date/Time: 2/24/2020 1:40 PM  Performed by: Darian Richard MD  Authorized by: Darian Richard MD   Comparison: compared with previous ECG from 2/28/2019  Similar to previous ECG  Rhythm: sinus rhythm  Rate: normal  BPM: 69  Conduction: conduction normal  ST Segments: ST segments normal  QRS axis: normal  Other: no other findings    Clinical impression:  normal ECG               I personally viewed and interpreted the patient's LAB data         Assessment:     1. Essential hypertension    2. Obesity, Class III, BMI 40-49.9 (morbid obesity) (CMS/HCC)    3. Bilateral leg edema    4. MORENO (dyspnea on exertion)          Plan:     Patient is doing very well blood pressure is very well controlled.  She will continue lisinopril 10 mg daily.  Healthy lifestyle and weight loss was emphasized.  EKG was done which does not show any acute changes.  She is doing very well from cardiac standpoint.  She will be reevaluated in 6 months.        No follow-ups on file.

## 2020-08-18 ENCOUNTER — TELEPHONE (OUTPATIENT)
Dept: CARDIOLOGY | Facility: CLINIC | Age: 55
End: 2020-08-18

## 2020-08-18 NOTE — TELEPHONE ENCOUNTER
Patient wants to know if she can be off plavix for 7 days so she can take a pain shot she gets the plavix from [facility] but he said he was not comfortable answering that question she needed to ask her cardiologist.

## 2020-08-19 NOTE — TELEPHONE ENCOUNTER
It is okay to stop Plavix for 7 days.  Tell her that I do not even know why she is taking it because I did not prescribe it she should talk to her doctor about the reason of the medicine

## 2020-08-24 ENCOUNTER — OFFICE VISIT (OUTPATIENT)
Dept: CARDIOLOGY | Facility: CLINIC | Age: 55
End: 2020-08-24

## 2020-08-24 VITALS
HEART RATE: 77 BPM | HEIGHT: 72 IN | OXYGEN SATURATION: 99 % | BODY MASS INDEX: 39.68 KG/M2 | SYSTOLIC BLOOD PRESSURE: 122 MMHG | WEIGHT: 293 LBS | TEMPERATURE: 98.1 F | DIASTOLIC BLOOD PRESSURE: 74 MMHG

## 2020-08-24 DIAGNOSIS — R07.9 CHEST PAIN IN ADULT: ICD-10-CM

## 2020-08-24 DIAGNOSIS — E66.01 OBESITY, CLASS III, BMI 40-49.9 (MORBID OBESITY) (HCC): ICD-10-CM

## 2020-08-24 DIAGNOSIS — I10 ESSENTIAL HYPERTENSION: Primary | ICD-10-CM

## 2020-08-24 PROCEDURE — 99213 OFFICE O/P EST LOW 20 MIN: CPT | Performed by: INTERNAL MEDICINE

## 2020-08-24 NOTE — PROGRESS NOTES
subjective     Chief Complaint   Patient presents with   • Hypertension   • Chest Pain     no chest pains since last office visit     History of Present Illness  Patient is 55 years old white female who is here for cardiology follow-up.  She seems be doing very well.  She is planning to have spinal injections for back pain and has to be off Plavix.  She is worried if she can stay off the Plavix.  Patient has no known coronary artery disease.  Last stress test was normal.  She also has no history of TIA or stroke and no history of PAD.    Blood pressure is very well controlled.  She is taking lisinopril.  She is also diabetic on metformin.  She denies any chest pain palpitations or shortness of breath.  She has been trying to lose weight    Past Surgical History:   Procedure Laterality Date   • CARDIOVASCULAR STRESS TEST  08/02/2017   • ECHO - CONVERTED  07/05/2017   • KNEE ARTHROSCOPY Right 2004   • SKIN CANCER EXCISION Left 12/2017     Family History   Problem Relation Age of Onset   • Heart attack Mother    • Heart disease Mother    • Diabetes Mother    • Kidney failure Mother    • Obesity Mother    • Kidney disease Father    • COPD Father    • Stroke Sister    • Hypertension Sister    • COPD Brother    • Heart disease Sister    • Heart attack Sister    • Heart failure Sister      Past Medical History:   Diagnosis Date   • Emphysema, unspecified (CMS/[facility])    • Hypertension      Patient Active Problem List   Diagnosis   • Chest pain in adult   • Essential hypertension   • Bilateral leg edema   • Obesity, Class III, BMI 40-49.9 (morbid obesity) (CMS/HCC)   • Gouty arthritis   • Obstructive sleep apnea   • MORENO (dyspnea on exertion)       Social History     Tobacco Use   • Smoking status: Never Smoker   • Smokeless tobacco: Never Used   Substance Use Topics   • Alcohol use: No     Comment:  occ  3-4 times a year   • Drug use: No       Allergies   Allergen Reactions   • Penicillins Other (See Comments)     Unknown    •  "Clindamycin/Lincomycin Itching and Rash   • Sulfa Antibiotics Itching and Rash   • Vancomycin Itching and Rash       Current Outpatient Medications on File Prior to Visit   Medication Sig   • albuterol (PROAIR HFA) 108 (90 Base) MCG/ACT inhaler Inhale 2 puffs Every 4 (Four) Hours As Needed for Shortness of Air.   • allopurinol (ZYLOPRIM) 100 MG tablet Take 100 mg by mouth Daily.   • cholecalciferol (VITAMIN D3) 1000 units tablet Take 1,000 Units by mouth Daily.   • clopidogrel (PLAVIX) 75 MG tablet TAKE ONE TABLET BY MOUTH EVERY DAY   • gabapentin (NEURONTIN) 300 MG capsule Take 300 mg by mouth 2 (Two) Times a Day.   • lisinopril (PRINIVIL,ZESTRIL) 10 MG tablet TAKE ONE TABLET BY MOUTH EVERY DAY   • metFORMIN (GLUCOPHAGE) 500 MG tablet Take 500 mg by mouth 2 (Two) Times a Day With Meals.     No current facility-administered medications on file prior to visit.          The following portions of the patient's history were reviewed and updated as appropriate: allergies, current medications, past family history, past medical history, past social history, past surgical history and problem list.    Review of Systems   Constitution: Negative.   HENT: Negative.  Negative for congestion.    Eyes: Negative.    Cardiovascular: Negative.  Negative for chest pain, cyanosis, dyspnea on exertion, irregular heartbeat, leg swelling, near-syncope, orthopnea, palpitations, paroxysmal nocturnal dyspnea and syncope.   Respiratory: Negative.  Negative for shortness of breath.    Hematologic/Lymphatic: Negative.    Musculoskeletal: Positive for back pain.   Gastrointestinal: Negative.    Neurological: Negative.  Negative for headaches.          Objective:     /74 (BP Location: Left arm, Patient Position: Sitting, Cuff Size: Adult)   Pulse 77   Temp 98.1 °F (36.7 °C)   Ht 182.9 cm (72\")   Wt (!) 144 kg (317 lb)   SpO2 99%   BMI 42.99 kg/m²   Physical Exam   Constitutional: She appears well-developed and well-nourished. No " distress.   HENT:   Head: Normocephalic and atraumatic.   Mouth/Throat: Oropharynx is clear and moist. No oropharyngeal exudate.   Eyes: Pupils are equal, round, and reactive to light. Conjunctivae and EOM are normal. No scleral icterus.   Neck: Normal range of motion. Neck supple. No JVD present. No tracheal deviation present. No thyromegaly present.   Cardiovascular: Normal rate, regular rhythm, normal heart sounds and intact distal pulses. PMI is not displaced. Exam reveals no gallop, no friction rub and no decreased pulses.   No murmur heard.  Pulses:       Carotid pulses are 3+ on the right side, and 3+ on the left side.       Radial pulses are 3+ on the right side, and 3+ on the left side.   Pulmonary/Chest: Effort normal and breath sounds normal. No respiratory distress. She has no wheezes. She has no rales. She exhibits no tenderness.   Abdominal: Soft. Bowel sounds are normal. She exhibits no distension, no abdominal bruit and no mass. There is no splenomegaly or hepatomegaly. There is no tenderness. There is no rebound and no guarding.   Musculoskeletal: Normal range of motion. She exhibits no edema, tenderness or deformity.   Lymphadenopathy:     She has no cervical adenopathy.   Neurological: She is alert. She has normal reflexes. No cranial nerve deficit. She exhibits normal muscle tone. Coordination normal.   Skin: Skin is warm and dry. No rash noted. She is not diaphoretic. No erythema.   Psychiatric: She has a normal mood and affect. Her behavior is normal. Judgment and thought content normal.         Lab Review    No lab work available    Procedures               Assessment:     1. Essential hypertension    2. Obesity, Class III, BMI 40-49.9 (morbid obesity) (CMS/HCC)    3. Chest pain in adult          Plan:     Patient is 55 years old white female who is planning to have spinal injection.  She was advised that it is okay to stay off the Plavix for 10 days to 2 weeks prior to injection.  She is  currently asymptomatic from cardiac standpoint  Her last stress test was negative for significant exercise-induced myocardial ischemia.  Was done about 3 years ago.  Last EKG in February 2020 was also normal.  Blood pressure is very well controlled she was advised to continue lisinopril  Aggressive risk factor modification weight loss emphasized.  Follow-up in 6 months      No follow-ups on file.

## 2021-02-22 ENCOUNTER — OFFICE VISIT (OUTPATIENT)
Dept: CARDIOLOGY | Facility: CLINIC | Age: 56
End: 2021-02-22

## 2021-02-22 VITALS
HEIGHT: 72 IN | TEMPERATURE: 98 F | HEART RATE: 81 BPM | DIASTOLIC BLOOD PRESSURE: 78 MMHG | SYSTOLIC BLOOD PRESSURE: 120 MMHG | WEIGHT: 293 LBS | OXYGEN SATURATION: 98 % | BODY MASS INDEX: 39.68 KG/M2

## 2021-02-22 DIAGNOSIS — R00.2 PALPITATIONS: Primary | ICD-10-CM

## 2021-02-22 DIAGNOSIS — R06.09 DOE (DYSPNEA ON EXERTION): ICD-10-CM

## 2021-02-22 DIAGNOSIS — E66.01 OBESITY, CLASS III, BMI 40-49.9 (MORBID OBESITY) (HCC): ICD-10-CM

## 2021-02-22 DIAGNOSIS — I10 ESSENTIAL HYPERTENSION: ICD-10-CM

## 2021-02-22 DIAGNOSIS — G47.33 OBSTRUCTIVE SLEEP APNEA: ICD-10-CM

## 2021-02-22 PROCEDURE — 99214 OFFICE O/P EST MOD 30 MIN: CPT | Performed by: INTERNAL MEDICINE

## 2021-02-22 PROCEDURE — 93000 ELECTROCARDIOGRAM COMPLETE: CPT | Performed by: INTERNAL MEDICINE

## 2021-02-22 RX ORDER — ROPINIROLE 1 MG/1
3 TABLET, FILM COATED ORAL NIGHTLY
COMMUNITY
Start: 2021-02-08

## 2021-02-22 RX ORDER — GABAPENTIN 400 MG/1
400 CAPSULE ORAL DAILY
COMMUNITY
Start: 2021-01-25 | End: 2022-03-28 | Stop reason: DRUGHIGH

## 2021-02-22 RX ORDER — CHLORAL HYDRATE 500 MG
1000 CAPSULE ORAL DAILY
COMMUNITY
Start: 2021-01-25

## 2021-02-22 RX ORDER — METOPROLOL SUCCINATE 25 MG/1
12.5 TABLET, EXTENDED RELEASE ORAL DAILY
Qty: 45 TABLET | Refills: 2 | Status: SHIPPED | OUTPATIENT
Start: 2021-02-22 | End: 2021-04-09

## 2021-02-22 RX ORDER — CETIRIZINE HYDROCHLORIDE 10 MG/1
10 TABLET ORAL DAILY
COMMUNITY
Start: 2021-02-08

## 2021-02-22 NOTE — PROGRESS NOTES
subjective     Chief Complaint   Patient presents with   • Chest Pain     Follow up,  pt states just with anxiety   • Results     labs   • Shortness of Breath     Follow up     History of Present Illness  Patient is 55 years old white female who is here for cardiology follow-up.  She states that she has not lost any weight.    She denies any chest pain but she complains of fast heartbeat she gets anxious in her chest at that time.  No chest discomfort.    She also gets short of breath when her heart is beating fast.    Blood pressure is well controlled with lisinopril 10 mg daily    She is diabetic and has lot of leg cramps, she is taking Requip 1 mg at bedtime and Neurontin 400 mg at bedtime.    Patient also is taking Plavix 75 mg daily.  Reason for Plavix therapy is not clear.    Past Surgical History:   Procedure Laterality Date   • CARDIOVASCULAR STRESS TEST  08/02/2017   • ECHO - CONVERTED  07/05/2017   • KNEE ARTHROSCOPY Right 2004   • SKIN CANCER EXCISION Left 12/2017     Family History   Problem Relation Age of Onset   • Heart attack Mother    • Heart disease Mother    • Diabetes Mother    • Kidney failure Mother    • Obesity Mother    • Kidney disease Father    • COPD Father    • Stroke Sister    • Hypertension Sister    • COPD Brother    • Heart disease Sister    • Heart attack Sister    • Heart failure Sister      Past Medical History:   Diagnosis Date   • Emphysema, unspecified (CMS/HCC)    • Hypertension    • Restless leg syndrome      Patient Active Problem List   Diagnosis   • Chest pain in adult   • Essential hypertension   • Bilateral leg edema   • Obesity, Class III, BMI 40-49.9 (morbid obesity) (CMS/HCC)   • Gouty arthritis   • Obstructive sleep apnea   • MORENO (dyspnea on exertion)       Social History     Tobacco Use   • Smoking status: Never Smoker   • Smokeless tobacco: Never Used   Substance Use Topics   • Alcohol use: No     Comment:  occ  3-4 times a year   • Drug use: No       Allergies    Allergen Reactions   • Penicillins Other (See Comments)     Unknown    • Clindamycin/Lincomycin Itching and Rash   • Sulfa Antibiotics Itching and Rash   • Vancomycin Itching and Rash       Current Outpatient Medications on File Prior to Visit   Medication Sig   • albuterol (PROAIR HFA) 108 (90 Base) MCG/ACT inhaler Inhale 2 puffs Every 4 (Four) Hours As Needed for Shortness of Air.   • allopurinol (ZYLOPRIM) 100 MG tablet Take 100 mg by mouth Daily.   • cetirizine (zyrTEC) 10 MG tablet    • cholecalciferol (VITAMIN D3) 1000 units tablet Take 1,000 Units by mouth Daily.   • clopidogrel (PLAVIX) 75 MG tablet TAKE ONE TABLET BY MOUTH EVERY DAY   • gabapentin (NEURONTIN) 400 MG capsule Take 400 mg by mouth Daily.   • lisinopril (PRINIVIL,ZESTRIL) 10 MG tablet TAKE ONE TABLET BY MOUTH EVERY DAY   • metFORMIN (GLUCOPHAGE) 1000 MG tablet Take 1,000 mg by mouth 2 (two) times a day.   • Omega-3 Fatty Acids (fish oil) 1000 MG capsule capsule Take 1,000 mg by mouth Daily.   • rOPINIRole (REQUIP) 1 MG tablet Take 1 mg by mouth Every Night.   • [DISCONTINUED] gabapentin (NEURONTIN) 300 MG capsule Take 300 mg by mouth 2 (Two) Times a Day.   • [DISCONTINUED] metFORMIN (GLUCOPHAGE) 500 MG tablet Take 500 mg by mouth 2 (Two) Times a Day With Meals.     No current facility-administered medications on file prior to visit.          The following portions of the patient's history were reviewed and updated as appropriate: allergies, current medications, past family history, past medical history, past social history, past surgical history and problem list.    Review of Systems   Constitution: Negative.   HENT: Negative.  Negative for congestion.    Eyes: Negative.    Cardiovascular: Positive for palpitations. Negative for chest pain, cyanosis, dyspnea on exertion, irregular heartbeat, leg swelling, near-syncope, orthopnea, paroxysmal nocturnal dyspnea and syncope.   Respiratory: Negative.  Negative for shortness of breath.   "  Hematologic/Lymphatic: Negative.    Musculoskeletal: Negative.    Gastrointestinal: Negative.    Neurological: Negative.  Negative for headaches.          Objective:     /78 (BP Location: Left arm, Patient Position: Sitting, Cuff Size: Large Adult)   Pulse 81   Temp 98 °F (36.7 °C)   Ht 182.9 cm (72\")   Wt (!) 144 kg (318 lb)   SpO2 98%   BMI 43.13 kg/m²   Vitals signs and nursing note reviewed.   Cardiovascular:      PMI at left midclavicular line. Normal rate. Regular rhythm. Normal S1. Normal S2.      Murmurs: There is no murmur.      No gallop. No click. No rub.   Pulses:     Intact distal pulses.   Edema:     Peripheral edema absent.           Lab Review          ECG 12 Lead    Date/Time: 2/22/2021 3:07 PM  Performed by: Darian Richard MD  Authorized by: Darian Richard MD   Comparison: compared with previous ECG from 2/24/2020  Similar to previous ECG  Comparison to previous ECG: Heart rate slightly faster and nonspecific ST and T wave changes are new.  Rhythm: sinus rhythm  Rate: normal  BPM: 81  Conduction: conduction normal  QRS axis: normal  Other findings: non-specific ST-T wave changes    Clinical impression: non-specific ECG               I personally viewed and interpreted the patient's LAB data         Assessment:     1. Palpitations    2. Essential hypertension    3. MORENO (dyspnea on exertion)    4. Obesity, Class III, BMI 40-49.9 (morbid obesity) (CMS/HCC)    5. Obstructive sleep apnea          Plan:     Patient complains of palpitations, heart beats fast.  EKG today does not show any acute changes.  She was started on Toprol XL 12.5 mg daily.  She will check her blood pressure closely.  She was advised to cut lisinopril down to 5 mg daily for now and if blood pressure is more than 130 systolic she can go back to lisinopril 20 mg daily  She will have 2 weeks event monitor placed.  Healthy lifestyle and weight loss emphasized.    Last stress test in 2017 was negative for " significant exercise-induced myocardial ischemia.      Reason for Plavix therapy is not clear  Follow-up scheduled        No follow-ups on file.

## 2021-03-31 ENCOUNTER — TREATMENT (OUTPATIENT)
Dept: CARDIOLOGY | Facility: CLINIC | Age: 56
End: 2021-03-31

## 2021-03-31 ENCOUNTER — TELEPHONE (OUTPATIENT)
Dept: CARDIOLOGY | Facility: CLINIC | Age: 56
End: 2021-03-31

## 2021-03-31 DIAGNOSIS — R00.2 PALPITATIONS: ICD-10-CM

## 2021-03-31 PROCEDURE — 93246 EXT ECG>7D<15D RECORDING: CPT | Performed by: INTERNAL MEDICINE

## 2021-03-31 PROCEDURE — 93248 EXT ECG>7D<15D REV&INTERPJ: CPT | Performed by: INTERNAL MEDICINE

## 2021-03-31 NOTE — TELEPHONE ENCOUNTER
Called and given message per Dr Richard.  She stated she is seeing her PCP josiane.  I advised her to discuss the lisinopril with her pcp tomorrow.  She agreed and v/u.

## 2021-03-31 NOTE — TELEPHONE ENCOUNTER
----- Message from Darian Richard MD sent at 3/31/2021  4:47 PM EDT -----  Monitor shows short runs of SVT (tachycardia).I have started her on Toprol-XL 25 mg half tablet daily.  She can increase that to whole tablet daily for now  ----- Message -----  From: Yessica Bright MA  Sent: 3/31/2021   4:24 PM EDT  To: Darian Richard MD    Please review event monitor report   Thanks

## 2021-04-09 RX ORDER — METOPROLOL SUCCINATE 25 MG/1
25 TABLET, EXTENDED RELEASE ORAL DAILY
Qty: 90 TABLET | Refills: 0 | Status: SHIPPED | OUTPATIENT
Start: 2021-04-09 | End: 2021-07-15 | Stop reason: SDUPTHER

## 2021-05-26 ENCOUNTER — TELEPHONE (OUTPATIENT)
Dept: CARDIOLOGY | Facility: CLINIC | Age: 56
End: 2021-05-26

## 2021-05-26 NOTE — TELEPHONE ENCOUNTER
Continue with current dose of Toprol.  I cannot increase Toprol anymore if his doctors keep increasing the other medicine

## 2021-05-26 NOTE — TELEPHONE ENCOUNTER
Coni called and wanted to know should she increase her toprol 25mg 1 daily?   She stated you wanted her HR in the 70's .    Readings are as followed..  19th  115/78  P 88  20th   119/82 P 82  22nd   120/85  P 82  24th   136/96   P 86   25th   108/82  P 85,  124/94  P70       Please advise.

## 2021-05-26 NOTE — TELEPHONE ENCOUNTER
Called pt and advised her. She stated that she already takes 25 mg of Toprol and she also went to her PCP and they changed her lisinopril to Lisinopril/ HCTZ 20/12.5 mg QD due to swelling. She stated that it has helped with her swelling.

## 2021-05-27 NOTE — TELEPHONE ENCOUNTER
Called and given message per Dr Richard.  She voiced understanding.  She stated she is having lots of personal stressors going on causing her HR to go up.

## 2021-06-28 ENCOUNTER — TELEPHONE (OUTPATIENT)
Dept: CARDIOLOGY | Facility: CLINIC | Age: 56
End: 2021-06-28

## 2021-06-28 NOTE — TELEPHONE ENCOUNTER
----- Message from Darian Richard MD sent at 6/28/2021  2:52 PM EDT -----  Cholesterol is good but triglyceride is elevated which is due to high sugar also

## 2021-07-15 RX ORDER — METOPROLOL SUCCINATE 25 MG/1
25 TABLET, EXTENDED RELEASE ORAL DAILY
Qty: 90 TABLET | Refills: 0 | Status: SHIPPED | OUTPATIENT
Start: 2021-07-15 | End: 2021-11-24

## 2021-08-10 ENCOUNTER — OFFICE VISIT (OUTPATIENT)
Dept: CARDIOLOGY | Facility: CLINIC | Age: 56
End: 2021-08-10

## 2021-08-10 VITALS
WEIGHT: 293 LBS | HEART RATE: 65 BPM | HEIGHT: 72 IN | SYSTOLIC BLOOD PRESSURE: 122 MMHG | TEMPERATURE: 98.2 F | BODY MASS INDEX: 39.68 KG/M2 | DIASTOLIC BLOOD PRESSURE: 80 MMHG

## 2021-08-10 DIAGNOSIS — E66.01 OBESITY, CLASS III, BMI 40-49.9 (MORBID OBESITY) (HCC): ICD-10-CM

## 2021-08-10 DIAGNOSIS — R60.0 BILATERAL LEG EDEMA: ICD-10-CM

## 2021-08-10 DIAGNOSIS — I10 ESSENTIAL HYPERTENSION: ICD-10-CM

## 2021-08-10 DIAGNOSIS — R07.9 CHEST PAIN IN ADULT: Primary | ICD-10-CM

## 2021-08-10 PROCEDURE — 99214 OFFICE O/P EST MOD 30 MIN: CPT | Performed by: INTERNAL MEDICINE

## 2021-08-10 RX ORDER — LISINOPRIL AND HYDROCHLOROTHIAZIDE 20; 12.5 MG/1; MG/1
1 TABLET ORAL DAILY
COMMUNITY
End: 2022-11-08 | Stop reason: SDUPTHER

## 2021-08-10 NOTE — PROGRESS NOTES
subjective     Chief Complaint   Patient presents with   • Follow-up     6 month    • Med Management     verbal     History of Present Illness    Patient is 56 years old white female who is here for 6 months cardiology follow-up.    Patient states that she has good days and bad days.  This morning she had chest pain and pressure sensation.  She blames it on a lot of stress because of family issues.    Her blood pressure is controlled with lisinopril HCT 20/12.5 daily  She has stopped her Toprol because blood pressure was dropping.    She also has not lost any weight.    Past Surgical History:   Procedure Laterality Date   • CARDIOVASCULAR STRESS TEST  08/02/2017   • ECHO - CONVERTED  07/05/2017   • KNEE ARTHROSCOPY Right 2004   • SKIN CANCER EXCISION Left 12/2017     Family History   Problem Relation Age of Onset   • Heart attack Mother    • Heart disease Mother    • Diabetes Mother    • Kidney failure Mother    • Obesity Mother    • Kidney disease Father    • COPD Father    • Stroke Sister    • Hypertension Sister    • COPD Brother    • Heart disease Sister    • Heart attack Sister    • Heart failure Sister      Past Medical History:   Diagnosis Date   • Emphysema, unspecified (CMS/[facility])    • Hypertension    • Restless leg syndrome      Patient Active Problem List   Diagnosis   • Chest pain in adult   • Essential hypertension   • Bilateral leg edema   • Obesity, Class III, BMI 40-49.9 (morbid obesity) (CMS/[facility])   • Gouty arthritis   • Obstructive sleep apnea   • MORENO (dyspnea on exertion)       Social History     Tobacco Use   • Smoking status: Never Smoker   • Smokeless tobacco: Never Used   Substance Use Topics   • Alcohol use: No     Comment:  occ  3-4 times a year   • Drug use: No       Allergies   Allergen Reactions   • Penicillins Other (See Comments)     Unknown    • Clindamycin/Lincomycin Itching and Rash   • Sulfa Antibiotics Itching and Rash   • Vancomycin Itching and Rash       Current Outpatient  Medications on File Prior to Visit   Medication Sig   • albuterol (PROAIR HFA) 108 (90 Base) MCG/ACT inhaler Inhale 2 puffs Every 4 (Four) Hours As Needed for Shortness of Air.   • allopurinol (ZYLOPRIM) 100 MG tablet Take 100 mg by mouth Daily.   • cetirizine (zyrTEC) 10 MG tablet    • cholecalciferol (VITAMIN D3) 1000 units tablet Take 1,000 Units by mouth Daily.   • clopidogrel (PLAVIX) 75 MG tablet TAKE ONE TABLET BY MOUTH EVERY DAY   • gabapentin (NEURONTIN) 400 MG capsule Take 400 mg by mouth Daily.   • lisinopril-hydrochlorothiazide (PRINZIDE,ZESTORETIC) 20-12.5 MG per tablet Take 1 tablet by mouth Daily.   • metFORMIN (GLUCOPHAGE) 1000 MG tablet Take 1,000 mg by mouth 2 (two) times a day.   • Omega-3 Fatty Acids (fish oil) 1000 MG capsule capsule Take 1,000 mg by mouth Daily.   • rOPINIRole (REQUIP) 1 MG tablet Take 1 mg by mouth Every Night.   • metoprolol succinate XL (TOPROL-XL) 25 MG 24 hr tablet Take 1 tablet by mouth Daily.   • [DISCONTINUED] lisinopril (PRINIVIL,ZESTRIL) 10 MG tablet TAKE ONE TABLET BY MOUTH EVERY DAY     No current facility-administered medications on file prior to visit.         The following portions of the patient's history were reviewed and updated as appropriate: allergies, current medications, past family history, past medical history, past social history, past surgical history and problem list.    Review of Systems   Constitutional: Negative.   HENT: Negative.  Negative for congestion.    Eyes: Negative.    Cardiovascular: Positive for chest pain. Negative for cyanosis, dyspnea on exertion, irregular heartbeat, leg swelling, near-syncope, orthopnea, palpitations, paroxysmal nocturnal dyspnea and syncope.   Respiratory: Negative.  Negative for shortness of breath.    Hematologic/Lymphatic: Negative.    Musculoskeletal: Negative.    Gastrointestinal: Negative.    Neurological: Negative.  Negative for headaches.          Objective:     /80   Pulse 65   Temp 98.2 °F (36.8  "°C)   Ht 182.9 cm (72.01\")   Wt (!) 145 kg (319 lb 9.6 oz)   BMI 43.34 kg/m²   Pulmonary:      Effort: Pulmonary effort is normal.      Breath sounds: Normal breath sounds. No stridor. No wheezing. No rhonchi. No rales.   Cardiovascular:      PMI at left midclavicular line. Normal rate. Regular rhythm. Normal S1. Normal S2.      Murmurs: There is no murmur.      No gallop. No click. No rub.   Pulses:     Intact distal pulses.   Edema:     Peripheral edema absent.           Lab Review          Procedures       I personally viewed and interpreted the patient's LAB data         Assessment:     1. Chest pain in adult    2. Essential hypertension    3. Obesity, Class III, BMI 40-49.9 (morbid obesity) (CMS/HCC)    4. Bilateral leg edema          Plan:     Chest pain  Patient states that she had a bad day this morning her lower chest pain however she does not wish to have an EKG done.  Risks were explained.  She states that is due to family stress.    She was seen initially because of fast heartbeat but she has stopped her Toprol because according to her her blood pressure drops but she is still taking her lisinopril HCT.  Obesity patient has not lost any weight.  Lab work reviewed.  Total cholesterol is 175, HDL is low at 28, LDL cholesterol is 105  Triglyceride 314.  Healthy lifestyle weight loss exercise program stressed.  Follow-up in 6 months        No follow-ups on file.  "

## 2021-11-24 RX ORDER — METOPROLOL SUCCINATE 25 MG/1
TABLET, EXTENDED RELEASE ORAL
Qty: 30 TABLET | Refills: 0 | Status: SHIPPED | OUTPATIENT
Start: 2021-11-24 | End: 2021-12-14

## 2021-12-14 RX ORDER — METOPROLOL SUCCINATE 25 MG/1
TABLET, EXTENDED RELEASE ORAL
Qty: 30 TABLET | Refills: 0 | Status: SHIPPED | OUTPATIENT
Start: 2021-12-14 | End: 2022-02-14 | Stop reason: SDUPTHER

## 2022-02-14 ENCOUNTER — OFFICE VISIT (OUTPATIENT)
Dept: CARDIOLOGY | Facility: CLINIC | Age: 57
End: 2022-02-14

## 2022-02-14 VITALS
OXYGEN SATURATION: 98 % | TEMPERATURE: 98.2 F | HEART RATE: 77 BPM | HEIGHT: 72 IN | DIASTOLIC BLOOD PRESSURE: 90 MMHG | SYSTOLIC BLOOD PRESSURE: 142 MMHG | WEIGHT: 293 LBS | BODY MASS INDEX: 39.68 KG/M2

## 2022-02-14 DIAGNOSIS — I10 ESSENTIAL HYPERTENSION: Primary | ICD-10-CM

## 2022-02-14 DIAGNOSIS — E66.01 OBESITY, CLASS III, BMI 40-49.9 (MORBID OBESITY): ICD-10-CM

## 2022-02-14 DIAGNOSIS — R00.2 PALPITATIONS: ICD-10-CM

## 2022-02-14 DIAGNOSIS — G47.33 OBSTRUCTIVE SLEEP APNEA: ICD-10-CM

## 2022-02-14 PROCEDURE — 99214 OFFICE O/P EST MOD 30 MIN: CPT | Performed by: INTERNAL MEDICINE

## 2022-02-14 RX ORDER — METOPROLOL SUCCINATE 25 MG/1
25 TABLET, EXTENDED RELEASE ORAL DAILY
Qty: 90 TABLET | Refills: 1 | Status: SHIPPED | OUTPATIENT
Start: 2022-02-14 | End: 2022-08-15 | Stop reason: SDUPTHER

## 2022-02-14 NOTE — PROGRESS NOTES
subjective     Chief Complaint   Patient presents with   • Palpitations     follow up   • Med Refill     pending     History of Present Illness    Patient is 56 years old white female who is here for cardiology follow-up.  She states that her blood pressure is running high.  However heart rate is well controlled.  She is taking Toprol-XL 25 mg daily which is keeping her heart rate in 70s.  She is taking lisinopril 20/12.5 mg daily and blood pressure according to her is running around 150 systolic.  Patient is overweight and has not lost any weight.  She also has obstructive sleep apnea    She denies any chest pain palpitations.    Past Surgical History:   Procedure Laterality Date   • CARDIOVASCULAR STRESS TEST  08/02/2017   • ECHO - CONVERTED  07/05/2017   • KNEE ARTHROSCOPY Right 2004   • SKIN CANCER EXCISION Left 12/2017     Family History   Problem Relation Age of Onset   • Heart attack Mother    • Heart disease Mother    • Diabetes Mother    • Kidney failure Mother    • Obesity Mother    • Kidney disease Father    • COPD Father    • Stroke Sister    • Hypertension Sister    • COPD Brother    • Heart disease Sister    • Heart attack Sister    • Heart failure Sister      Past Medical History:   Diagnosis Date   • Emphysema, unspecified ([facility])    • Hypertension    • Restless leg syndrome      Patient Active Problem List   Diagnosis   • Chest pain in adult   • Essential hypertension   • Bilateral leg edema   • Obesity, Class III, BMI 40-49.9 (morbid obesity) ([facility])   • Gouty arthritis   • Obstructive sleep apnea   • MORENO (dyspnea on exertion)   • Palpitations       Social History     Tobacco Use   • Smoking status: Never Smoker   • Smokeless tobacco: Never Used   Substance Use Topics   • Alcohol use: No     Comment:  occ  3-4 times a year   • Drug use: No       Allergies   Allergen Reactions   • Penicillins Other (See Comments)     Unknown    • Clindamycin/Lincomycin Itching and Rash   • Sulfa Antibiotics Itching and  Rash   • Vancomycin Itching and Rash       Current Outpatient Medications on File Prior to Visit   Medication Sig   • albuterol (PROAIR HFA) 108 (90 Base) MCG/ACT inhaler Inhale 2 puffs Every 4 (Four) Hours As Needed for Shortness of Air.   • allopurinol (ZYLOPRIM) 100 MG tablet Take 100 mg by mouth Daily.   • cetirizine (zyrTEC) 10 MG tablet    • cholecalciferol (VITAMIN D3) 1000 units tablet Take 1,000 Units by mouth Daily.   • clopidogrel (PLAVIX) 75 MG tablet TAKE ONE TABLET BY MOUTH EVERY DAY   • gabapentin (NEURONTIN) 400 MG capsule Take 400 mg by mouth Daily.   • lisinopril-hydrochlorothiazide (PRINZIDE,ZESTORETIC) 20-12.5 MG per tablet Take 1 tablet by mouth Daily.   • metFORMIN (GLUCOPHAGE) 1000 MG tablet Take 1,000 mg by mouth 2 (two) times a day.   • Omega-3 Fatty Acids (fish oil) 1000 MG capsule capsule Take 1,000 mg by mouth Daily.   • rOPINIRole (REQUIP) 1 MG tablet Take 1 mg by mouth Every Night.   • [DISCONTINUED] metoprolol succinate XL (TOPROL-XL) 25 MG 24 hr tablet TAKE ONE TABLET BY MOUTH EVERY DAY     No current facility-administered medications on file prior to visit.         The following portions of the patient's history were reviewed and updated as appropriate: allergies, current medications, past family history, past medical history, past social history, past surgical history and problem list.    Review of Systems   Constitutional: Negative.   HENT: Negative.  Negative for congestion.    Eyes: Negative.    Cardiovascular: Negative.  Negative for chest pain, cyanosis, dyspnea on exertion, irregular heartbeat, leg swelling, near-syncope, orthopnea, palpitations, paroxysmal nocturnal dyspnea and syncope.        Elevated blood pressure   Respiratory: Negative.  Negative for shortness of breath.    Hematologic/Lymphatic: Negative.    Musculoskeletal: Negative.    Gastrointestinal: Negative.    Neurological: Negative.  Negative for headaches.          Objective:     /90 (BP Location: Left  "arm, Patient Position: Sitting, Cuff Size: Adult)   Pulse 77   Temp 98.2 °F (36.8 °C)   Ht 182.9 cm (72\")   Wt (!) 141 kg (311 lb)   SpO2 98%   BMI 42.18 kg/m²   Pulmonary:      Effort: Pulmonary effort is normal.      Breath sounds: Normal breath sounds. No stridor. No wheezing. No rhonchi. No rales.   Cardiovascular:      PMI at left midclavicular line. Normal rate. Regular rhythm. Normal S1. Normal S2.      Murmurs: There is no murmur.      No gallop. No click. No rub.   Pulses:     Intact distal pulses.   Edema:     Peripheral edema absent.           Lab Review  Lab Results   Component Value Date     08/01/2017    K 4.3 08/01/2017     08/01/2017    BUN 14 08/01/2017    CREATININE 0.96 08/01/2017    GLUCOSE 138 (H) 08/01/2017    CALCIUM 9.4 08/01/2017    ALT 18 08/01/2017    ALKPHOS 58 08/01/2017     No results found for: CKTOTAL  Lab Results   Component Value Date    WBC 6.58 08/01/2017    HGB 15.5 08/01/2017    HCT 46.4 08/01/2017     08/01/2017     No results found for: INR  No results found for: MG  Lab Results   Component Value Date    TSH 3.369 08/01/2017     No results found for: BNP  Lab Results   Component Value Date    CHOL 151 08/01/2017    TRIG 154 (H) 08/01/2017    HDL 29 (L) 08/01/2017    VLDL 30.8 08/01/2017    LDLHDL 3.14 08/01/2017     Lab Results   Component Value Date    LDL 91 08/01/2017       Procedures       I personally viewed and interpreted the patient's LAB data         Assessment:     1. Essential hypertension    2. Obesity, Class III, BMI 40-49.9 (morbid obesity) (HCC)    3. Palpitations    4. Obstructive sleep apnea          Plan:     Blood pressure is not very well controlled and patient is concerned.  She says that at home blood pressure is running high.  She is taking lisinopril 20/12.5 daily  She was advised to keep her blood pressure checked and she will discuss with her PCP  Meanwhile she was advised to try taking 1 pill in the evening and half a pill in " the morning  Palpitations are controlled with Toprol which was continued.    Healthy lifestyle weight loss emphasized.  Patient also has sleep apnea and COPD she is following closely with Dr. Rangel.  Follow-up with me in 6 months.        No follow-ups on file.

## 2022-03-28 ENCOUNTER — OFFICE VISIT (OUTPATIENT)
Dept: CARDIOLOGY | Facility: CLINIC | Age: 57
End: 2022-03-28

## 2022-03-28 VITALS
HEIGHT: 72 IN | HEART RATE: 77 BPM | TEMPERATURE: 96.8 F | SYSTOLIC BLOOD PRESSURE: 110 MMHG | OXYGEN SATURATION: 98 % | BODY MASS INDEX: 39.68 KG/M2 | WEIGHT: 293 LBS | DIASTOLIC BLOOD PRESSURE: 68 MMHG

## 2022-03-28 DIAGNOSIS — E66.01 OBESITY, CLASS III, BMI 40-49.9 (MORBID OBESITY): ICD-10-CM

## 2022-03-28 DIAGNOSIS — R00.2 PALPITATIONS: Primary | ICD-10-CM

## 2022-03-28 DIAGNOSIS — I10 ESSENTIAL HYPERTENSION: ICD-10-CM

## 2022-03-28 PROCEDURE — 99214 OFFICE O/P EST MOD 30 MIN: CPT | Performed by: INTERNAL MEDICINE

## 2022-03-28 PROCEDURE — 93000 ELECTROCARDIOGRAM COMPLETE: CPT | Performed by: INTERNAL MEDICINE

## 2022-03-28 RX ORDER — GABAPENTIN 300 MG/1
300 CAPSULE ORAL
COMMUNITY
Start: 2022-03-19 | End: 2022-11-08 | Stop reason: ALTCHOICE

## 2022-03-28 RX ORDER — NITROFURANTOIN MACROCRYSTALS 100 MG/1
100 CAPSULE ORAL 2 TIMES DAILY
COMMUNITY
Start: 2022-03-19 | End: 2022-08-15

## 2022-03-28 NOTE — PROGRESS NOTES
subjective     Chief Complaint   Patient presents with   • Surgical Clearance     Oral surgery      History of Present Illness  Patient is 56 years old white female who is here for preop for oral surgery.  Patient plans to have teeth extraction.  She states that because of teeth problems she was to get all of remaining teeth removed.    Patient has history of hypertension, hyperlipidemia, obesity and diabetes mellitus    Patient denies any chest pain or palpitations at this time.  She is taking metoprolol ER 25 mg daily heart rate is very well controlled.  Blood pressure is very well controlled with lisinopril HCT 20/12.5 daily.  She is morbidly obese and has hyperuricemia.  She is taking Zyloprim.  She is taking Plavix through her PCP apparently because of peripheral vascular disease  There is no cardiac reason or neurological reason to take Plavix.      Past Surgical History:   Procedure Laterality Date   • CARDIOVASCULAR STRESS TEST  08/02/2017   • ECHO - CONVERTED  07/05/2017   • KNEE ARTHROSCOPY Right 2004   • SKIN CANCER EXCISION Left 12/2017     Family History   Problem Relation Age of Onset   • Heart attack Mother    • Heart disease Mother    • Diabetes Mother    • Kidney failure Mother    • Obesity Mother    • Kidney disease Father    • COPD Father    • Stroke Sister    • Hypertension Sister    • COPD Brother    • Heart disease Sister    • Heart attack Sister    • Heart failure Sister      Past Medical History:   Diagnosis Date   • Emphysema, unspecified (HCC)    • Hypertension    • Restless leg syndrome      Patient Active Problem List   Diagnosis   • Chest pain in adult   • Essential hypertension   • Bilateral leg edema   • Obesity, Class III, BMI 40-49.9 (morbid obesity) ([facility])   • Gouty arthritis   • Obstructive sleep apnea   • MORENO (dyspnea on exertion)   • Palpitations       Social History     Tobacco Use   • Smoking status: Never Smoker   • Smokeless tobacco: Never Used   Substance Use Topics   •  Alcohol use: No     Comment:  occ  3-4 times a year   • Drug use: No       Allergies   Allergen Reactions   • Penicillins Other (See Comments)     Unknown    • Clindamycin/Lincomycin Itching and Rash   • Sulfa Antibiotics Itching and Rash   • Vancomycin Itching and Rash       Current Outpatient Medications on File Prior to Visit   Medication Sig   • albuterol (PROAIR HFA) 108 (90 Base) MCG/ACT inhaler Inhale 2 puffs Every 4 (Four) Hours As Needed for Shortness of Air.   • allopurinol (ZYLOPRIM) 100 MG tablet Take 100 mg by mouth Daily.   • cetirizine (zyrTEC) 10 MG tablet    • cholecalciferol (VITAMIN D3) 1000 units tablet Take 1,000 Units by mouth Daily.   • clopidogrel (PLAVIX) 75 MG tablet TAKE ONE TABLET BY MOUTH EVERY DAY   • gabapentin (NEURONTIN) 300 MG capsule Take 300 mg by mouth every night at bedtime.   • lisinopril-hydrochlorothiazide (PRINZIDE,ZESTORETIC) 20-12.5 MG per tablet Take 1 tablet by mouth Daily.   • metFORMIN (GLUCOPHAGE) 1000 MG tablet Take 1,000 mg by mouth 2 (two) times a day.   • metoprolol succinate XL (TOPROL-XL) 25 MG 24 hr tablet Take 1 tablet by mouth Daily.   • nitrofurantoin (MACRODANTIN) 100 MG capsule Take 100 mg by mouth 2 (Two) Times a Day.   • Omega-3 Fatty Acids (fish oil) 1000 MG capsule capsule Take 1,000 mg by mouth Daily.   • rOPINIRole (REQUIP) 1 MG tablet Take 1 mg by mouth Every Night.   • [DISCONTINUED] gabapentin (NEURONTIN) 400 MG capsule Take 400 mg by mouth Daily.     No current facility-administered medications on file prior to visit.         The following portions of the patient's history were reviewed and updated as appropriate: allergies, current medications, past family history, past medical history, past social history, past surgical history and problem list.    Review of Systems   Constitutional: Negative.   HENT: Negative.  Negative for congestion.    Eyes: Negative.    Cardiovascular: Negative.  Negative for chest pain, cyanosis, dyspnea on exertion,  "irregular heartbeat, leg swelling, near-syncope, orthopnea, palpitations, paroxysmal nocturnal dyspnea and syncope.   Respiratory: Negative.  Negative for shortness of breath.    Hematologic/Lymphatic: Negative.    Musculoskeletal: Negative.    Gastrointestinal: Negative.    Neurological: Negative.  Negative for headaches.          Objective:     /68 (BP Location: Left arm, Patient Position: Sitting, Cuff Size: Large Adult)   Pulse 77   Temp 96.8 °F (36 °C)   Ht 182.9 cm (72\")   Wt (!) 138 kg (304 lb)   SpO2 98%   BMI 41.23 kg/m²   Pulmonary:      Effort: Pulmonary effort is normal.      Breath sounds: Normal breath sounds. No stridor. No wheezing. No rhonchi. No rales.   Cardiovascular:      PMI at left midclavicular line. Normal rate. Regular rhythm. Normal S1. Normal S2.      Murmurs: There is no murmur.      No gallop. No click. No rub.   Pulses:     Intact distal pulses.   Edema:     Peripheral edema absent.           Lab Review  Lab Results   Component Value Date     08/01/2017    K 4.3 08/01/2017     08/01/2017    BUN 14 08/01/2017    CREATININE 0.96 08/01/2017    GLUCOSE 138 (H) 08/01/2017    CALCIUM 9.4 08/01/2017    ALT 18 08/01/2017    ALKPHOS 58 08/01/2017     No results found for: CKTOTAL  Lab Results   Component Value Date    WBC 6.58 08/01/2017    HGB 15.5 08/01/2017    HCT 46.4 08/01/2017     08/01/2017     No results found for: INR  No results found for: MG  Lab Results   Component Value Date    TSH 3.369 08/01/2017     No results found for: BNP  Lab Results   Component Value Date    CHOL 151 08/01/2017    TRIG 154 (H) 08/01/2017    HDL 29 (L) 08/01/2017    VLDL 30.8 08/01/2017    LDLHDL 3.14 08/01/2017     Lab Results   Component Value Date    LDL 91 08/01/2017         ECG 12 Lead    Date/Time: 3/28/2022 3:06 PM  Performed by: Darian Richard MD  Authorized by: Darian Richard MD   Comparison: compared with previous ECG from 2/22/2021  Similar to " previous ECG  Rhythm: sinus rhythm  Rate: normal  BPM: 77  Conduction: conduction normal  ST Segments: ST segments normal  T Waves: T waves normal  QRS axis: normal  Other: no other findings  Other findings: non-specific ST-T wave changes    Clinical impression: normal ECG               I personally viewed and interpreted the patient's LAB data         Assessment:     1. Palpitations    2. Essential hypertension    3. Obesity, Class III, BMI 40-49.9 (morbid obesity) ([facility])          Plan:     Patient is 56 years old white female who is here for preop cardiac clearance.  She is planning to have multiple tooth extractions.  Patient has history of hypertension, obesity, obstructive sleep apnea and diabetes mellitus.  She has no known coronary artery disease.  Her stress test 5 years ago was negative for ischemia with normal LV ejection fraction.  Echocardiogram at that time was also normal.  Cardiac event monitor in March 2021 showed runs of supraventricular tachycardia.  She is taking metoprolol and is doing very well.    Patient is cleared for surgery from cardiac standpoint as low risk.  She is taking Plavix for noncardiac reason through her PCP  Plavix could be held for up to 7 days prior to surgery and then resumed when it is safe.      Thank you for giving me the oppertunity to participate in your patient's cardiac care.    Sincerely,    NYLA Richard M.D. FACP FAC        No follow-ups on file.

## 2022-08-15 ENCOUNTER — OFFICE VISIT (OUTPATIENT)
Dept: CARDIOLOGY | Facility: CLINIC | Age: 57
End: 2022-08-15

## 2022-08-15 VITALS
HEIGHT: 72 IN | TEMPERATURE: 96.9 F | OXYGEN SATURATION: 98 % | DIASTOLIC BLOOD PRESSURE: 68 MMHG | HEART RATE: 95 BPM | WEIGHT: 293 LBS | SYSTOLIC BLOOD PRESSURE: 108 MMHG | BODY MASS INDEX: 39.68 KG/M2

## 2022-08-15 DIAGNOSIS — I48.0 PAROXYSMAL ATRIAL FIBRILLATION: Primary | ICD-10-CM

## 2022-08-15 DIAGNOSIS — E66.01 OBESITY, CLASS III, BMI 40-49.9 (MORBID OBESITY): ICD-10-CM

## 2022-08-15 DIAGNOSIS — R00.2 PALPITATIONS: ICD-10-CM

## 2022-08-15 DIAGNOSIS — I10 ESSENTIAL HYPERTENSION: ICD-10-CM

## 2022-08-15 PROCEDURE — 99214 OFFICE O/P EST MOD 30 MIN: CPT | Performed by: INTERNAL MEDICINE

## 2022-08-15 PROCEDURE — 93000 ELECTROCARDIOGRAM COMPLETE: CPT | Performed by: INTERNAL MEDICINE

## 2022-08-15 RX ORDER — METOPROLOL SUCCINATE 25 MG/1
25 TABLET, EXTENDED RELEASE ORAL DAILY
Qty: 90 TABLET | Refills: 1 | Status: SHIPPED | OUTPATIENT
Start: 2022-08-15 | End: 2022-11-08 | Stop reason: SDUPTHER

## 2022-08-15 RX ORDER — FUROSEMIDE 20 MG/1
1 TABLET ORAL AS NEEDED
COMMUNITY
Start: 2022-07-25 | End: 2022-11-08

## 2022-08-15 RX ORDER — CEPHALEXIN 500 MG/1
500 CAPSULE ORAL 2 TIMES DAILY
COMMUNITY
Start: 2022-08-08 | End: 2022-11-08

## 2022-08-15 RX ORDER — SITAGLIPTIN 100 MG/1
100 TABLET, FILM COATED ORAL DAILY
COMMUNITY
Start: 2022-08-08

## 2022-08-20 NOTE — PROGRESS NOTES
subjective     Chief Complaint   Patient presents with   • Palpitations     Follow up    • Med Refill     Pending      History of Present Illness  Patient is 57 years old white female who is here for cardiology follow-up.  She complained of palpitations.  Event monitor in the past did not show any significant arrhythmia however today her heart rate is quite irregular and appears to be in atrial fibrillation.  Will get EKG.    Patient denies any chest pain or shortness of breath.    She has history of hypertension, blood pressure according to her is well controlled runs around 130 systolic.  She is taking lisinopril 20/12.5 daily.    Past Surgical History:   Procedure Laterality Date   • CARDIOVASCULAR STRESS TEST  08/02/2017   • ECHO - CONVERTED  07/05/2017   • KNEE ARTHROSCOPY Right 2004   • SKIN CANCER EXCISION Left 12/2017     Family History   Problem Relation Age of Onset   • Heart attack Mother    • Heart disease Mother    • Diabetes Mother    • Kidney failure Mother    • Obesity Mother    • Kidney disease Father    • COPD Father    • Stroke Sister    • Hypertension Sister    • COPD Brother    • Heart disease Sister    • Heart attack Sister    • Heart failure Sister      Past Medical History:   Diagnosis Date   • Emphysema, unspecified ([facility])    • Hypertension    • Restless leg syndrome      Patient Active Problem List   Diagnosis   • Chest pain in adult   • Essential hypertension   • Bilateral leg edema   • Obesity, Class III, BMI 40-49.9 (morbid obesity) ([facility])   • Gouty arthritis   • Obstructive sleep apnea   • MORENO (dyspnea on exertion)   • Palpitations   • Paroxysmal atrial fibrillation ([facility])       Social History     Tobacco Use   • Smoking status: Never Smoker   • Smokeless tobacco: Never Used   Substance Use Topics   • Alcohol use: No     Comment:  occ  3-4 times a year   • Drug use: No       Allergies   Allergen Reactions   • Penicillins Other (See Comments)     Unknown    • Clindamycin/Lincomycin Itching  and Rash   • Sulfa Antibiotics Itching and Rash   • Vancomycin Itching and Rash       Current Outpatient Medications on File Prior to Visit   Medication Sig   • albuterol (PROAIR HFA) 108 (90 Base) MCG/ACT inhaler Inhale 2 puffs Every 4 (Four) Hours As Needed for Shortness of Air.   • allopurinol (ZYLOPRIM) 100 MG tablet Take 100 mg by mouth Daily.   • cephalexin (KEFLEX) 500 MG capsule Take 500 mg by mouth 2 (Two) Times a Day.   • cetirizine (zyrTEC) 10 MG tablet Take 10 mg by mouth Daily.   • cholecalciferol (VITAMIN D3) 1000 units tablet Take 1,000 Units by mouth Daily.   • furosemide (LASIX) 20 MG tablet Take 1 tablet by mouth As Needed.   • gabapentin (NEURONTIN) 300 MG capsule Take 300 mg by mouth every night at bedtime.   • Januvia 100 MG tablet Take 100 mg by mouth Daily.   • lisinopril-hydrochlorothiazide (PRINZIDE,ZESTORETIC) 20-12.5 MG per tablet Take 1 tablet by mouth Daily.   • metFORMIN (GLUCOPHAGE) 1000 MG tablet Take 1,000 mg by mouth Daily.   • Omega-3 Fatty Acids (fish oil) 1000 MG capsule capsule Take 1,000 mg by mouth Daily.   • rOPINIRole (REQUIP) 1 MG tablet Take 3 mg by mouth Every Night.     No current facility-administered medications on file prior to visit.         The following portions of the patient's history were reviewed and updated as appropriate: allergies, current medications, past family history, past medical history, past social history, past surgical history and problem list.    Review of Systems   Constitutional: Negative.   HENT: Negative.  Negative for congestion.    Eyes: Negative.    Cardiovascular: Positive for palpitations. Negative for chest pain, cyanosis, dyspnea on exertion, irregular heartbeat, leg swelling, near-syncope, orthopnea, paroxysmal nocturnal dyspnea and syncope.   Respiratory: Negative.  Negative for shortness of breath.    Hematologic/Lymphatic: Negative.    Musculoskeletal: Negative.    Gastrointestinal: Negative.    Neurological: Negative.  Negative for  "headaches.          Objective:     /68 (BP Location: Left arm, Patient Position: Sitting, Cuff Size: Adult)   Pulse 95   Temp 96.9 °F (36.1 °C)   Ht 182.9 cm (72\")   Wt (!) 137 kg (301 lb)   SpO2 98%   BMI 40.82 kg/m²   Pulmonary:      Effort: Pulmonary effort is normal.      Breath sounds: Normal breath sounds. No stridor. No wheezing. No rhonchi. No rales.   Cardiovascular:      PMI at left midclavicular line. Normal rate. Regular rhythm. Normal S1. Normal S2.      Murmurs: There is no murmur.      No gallop. No click. No rub.   Pulses:     Intact distal pulses.   Edema:     Peripheral edema absent.           Lab Review  Lab Results   Component Value Date     08/01/2017    K 4.3 08/01/2017     08/01/2017    BUN 14 08/01/2017    CREATININE 0.96 08/01/2017    GLUCOSE 138 (H) 08/01/2017    CALCIUM 9.4 08/01/2017    ALT 18 08/01/2017    ALKPHOS 58 08/01/2017     No results found for: CKTOTAL  Lab Results   Component Value Date    WBC 6.58 08/01/2017    HGB 15.5 08/01/2017    HCT 46.4 08/01/2017     08/01/2017     No results found for: INR  No results found for: MG  Lab Results   Component Value Date    TSH 3.369 08/01/2017     No results found for: BNP  Lab Results   Component Value Date    CHOL 151 08/01/2017    TRIG 154 (H) 08/01/2017    HDL 29 (L) 08/01/2017    VLDL 30.8 08/01/2017    LDLHDL 3.14 08/01/2017     Lab Results   Component Value Date    LDL 91 08/01/2017         ECG 12 Lead    Date/Time: 8/20/2022 2:36 PM  Performed by: Darian Richard MD  Authorized by: Darian Richard MD   Comparison: compared with previous ECG from 3/28/2022  Comparison to previous ECG: Atrial fibrillation is new  Rhythm: atrial fibrillation  Rate: normal  BPM: 90  Conduction: conduction normal  QRS axis: normal  Other findings: non-specific ST-T wave changes    Clinical impression: abnormal EKG               I personally viewed and interpreted the patient's LAB data         Assessment: "     1. Paroxysmal atrial fibrillation (HCC)    2. Palpitations    3. Essential hypertension    4. Obesity, Class III, BMI 40-49.9 (morbid obesity) (HCC)          Plan:     Patient is 57 years old white female who is planning to have some teeth extraction.  She complains of palpitations and EKG today shows atrial fibrillation.    Heart rate is also fast.  She was advised to start metoprolol ER 25 mg daily and was started on Eliquis 5 mg twice daily.    Her surgery is scheduled for August 25, 2022 she was advised to stop Eliquis after August 22 dose.  Postop she can resume Eliquis whenever acceptable by oral surgeon.  She can stay off Eliquis longer if needed.            She is also advised to watch her blood pressure which is running slightly low although patient states her blood pressure is good at home.  Healthy lifestyle emphasized    Later on will reevaluate and decide regarding rhythm control.  Follow-up scheduled        No follow-ups on file.

## 2022-09-20 ENCOUNTER — HOSPITAL ENCOUNTER (OUTPATIENT)
Dept: CARDIOLOGY | Facility: HOSPITAL | Age: 57
Discharge: HOME | End: 2022-09-20
Admitting: INTERNAL MEDICINE

## 2022-09-20 ENCOUNTER — TELEPHONE (OUTPATIENT)
Dept: CARDIOLOGY | Facility: CLINIC | Age: 57
End: 2022-09-20

## 2022-09-20 DIAGNOSIS — I48.0 PAROXYSMAL ATRIAL FIBRILLATION: ICD-10-CM

## 2022-09-20 LAB
BH CV ECHO MEAS - ACS: 2.2 CM
BH CV ECHO MEAS - AO MAX PG: 8.5 MMHG
BH CV ECHO MEAS - AO MEAN PG: 4 MMHG
BH CV ECHO MEAS - AO ROOT DIAM: 3 CM
BH CV ECHO MEAS - AO V2 MAX: 146 CM/SEC
BH CV ECHO MEAS - AO V2 VTI: 32.2 CM
BH CV ECHO MEAS - EDV(CUBED): 126.5 ML
BH CV ECHO MEAS - EDV(MOD-SP4): 77.3 ML
BH CV ECHO MEAS - EF(MOD-SP4): 64.8 %
BH CV ECHO MEAS - ESV(CUBED): 64.5 ML
BH CV ECHO MEAS - ESV(MOD-SP4): 27.2 ML
BH CV ECHO MEAS - FS: 20.1 %
BH CV ECHO MEAS - IVS/LVPW: 1.07 CM
BH CV ECHO MEAS - IVSD: 1.38 CM
BH CV ECHO MEAS - LA DIMENSION: 2.4 CM
BH CV ECHO MEAS - LAT PEAK E' VEL: 9 CM/SEC
BH CV ECHO MEAS - LV DIASTOLIC VOL/BSA (35-75): 30.5 CM2
BH CV ECHO MEAS - LV MASS(C)D: 273.7 GRAMS
BH CV ECHO MEAS - LV SYSTOLIC VOL/BSA (12-30): 10.7 CM2
BH CV ECHO MEAS - LVIDD: 5 CM
BH CV ECHO MEAS - LVIDS: 4 CM
BH CV ECHO MEAS - LVOT AREA: 3.1 CM2
BH CV ECHO MEAS - LVOT DIAM: 2 CM
BH CV ECHO MEAS - LVPWD: 1.29 CM
BH CV ECHO MEAS - MED PEAK E' VEL: 10.1 CM/SEC
BH CV ECHO MEAS - MV A MAX VEL: 72.2 CM/SEC
BH CV ECHO MEAS - MV E MAX VEL: 109 CM/SEC
BH CV ECHO MEAS - MV E/A: 1.51
BH CV ECHO MEAS - PA ACC TIME: 0.09 SEC
BH CV ECHO MEAS - PA PR(ACCEL): 39.4 MMHG
BH CV ECHO MEAS - RAP SYSTOLE: 10 MMHG
BH CV ECHO MEAS - RVSP: 37.7 MMHG
BH CV ECHO MEAS - SI(MOD-SP4): 19.8 ML/M2
BH CV ECHO MEAS - SV(MOD-SP4): 50.1 ML
BH CV ECHO MEAS - TAPSE (>1.6): 2.28 CM
BH CV ECHO MEAS - TR MAX PG: 27.7 MMHG
BH CV ECHO MEAS - TR MAX VEL: 263 CM/SEC
BH CV ECHO MEASUREMENTS AVERAGE E/E' RATIO: 11.41
LEFT ATRIUM VOLUME INDEX: 18.7 ML/M2
MAXIMAL PREDICTED HEART RATE: 162 BPM
STRESS TARGET HR: 138 BPM

## 2022-09-20 PROCEDURE — 93306 TTE W/DOPPLER COMPLETE: CPT

## 2022-09-20 PROCEDURE — 93306 TTE W/DOPPLER COMPLETE: CPT | Performed by: INTERNAL MEDICINE

## 2022-09-20 NOTE — TELEPHONE ENCOUNTER
----- Message from Darian Richard MD sent at 9/20/2022  1:23 PM EDT -----  Echocardiogram is okay. It shows that she is back in sinus rhythm.  Normal atrial fibrillation however continue anticoagulation with Eliquis

## 2022-11-07 RX ORDER — APIXABAN 5 MG/1
TABLET, FILM COATED ORAL
Qty: 60 TABLET | Refills: 2 | Status: SHIPPED | OUTPATIENT
Start: 2022-11-07 | End: 2023-03-03

## 2022-11-08 ENCOUNTER — OFFICE VISIT (OUTPATIENT)
Dept: CARDIOLOGY | Facility: CLINIC | Age: 57
End: 2022-11-08

## 2022-11-08 VITALS
HEIGHT: 72 IN | OXYGEN SATURATION: 98 % | SYSTOLIC BLOOD PRESSURE: 104 MMHG | WEIGHT: 293 LBS | BODY MASS INDEX: 39.68 KG/M2 | HEART RATE: 95 BPM | DIASTOLIC BLOOD PRESSURE: 72 MMHG

## 2022-11-08 DIAGNOSIS — I10 ESSENTIAL HYPERTENSION: ICD-10-CM

## 2022-11-08 DIAGNOSIS — I48.0 PAROXYSMAL ATRIAL FIBRILLATION: Primary | ICD-10-CM

## 2022-11-08 DIAGNOSIS — E66.01 OBESITY, CLASS III, BMI 40-49.9 (MORBID OBESITY): ICD-10-CM

## 2022-11-08 DIAGNOSIS — Z79.01 CHRONIC ANTICOAGULATION: ICD-10-CM

## 2022-11-08 PROCEDURE — 99214 OFFICE O/P EST MOD 30 MIN: CPT | Performed by: INTERNAL MEDICINE

## 2022-11-08 PROCEDURE — 93000 ELECTROCARDIOGRAM COMPLETE: CPT | Performed by: INTERNAL MEDICINE

## 2022-11-08 RX ORDER — GABAPENTIN 300 MG/1
300 CAPSULE ORAL DAILY
COMMUNITY

## 2022-11-08 RX ORDER — METOPROLOL SUCCINATE 25 MG/1
50 TABLET, EXTENDED RELEASE ORAL DAILY
Qty: 180 TABLET | Refills: 1 | Status: SHIPPED | OUTPATIENT
Start: 2022-11-08

## 2022-11-08 RX ORDER — LISINOPRIL AND HYDROCHLOROTHIAZIDE 20; 12.5 MG/1; MG/1
0.5 TABLET ORAL DAILY
Qty: 60 TABLET | Refills: 0 | Status: SHIPPED | OUTPATIENT
Start: 2022-11-08

## 2022-11-08 NOTE — PROGRESS NOTES
subjective     Chief Complaint   Patient presents with   • Hypertension     Patient states for the past couple days BP has been Low    • Results     Heart Echo      History of Present Illness  Patient is 57 years old white female with history of chronic atrial fibrillation.  She presents to the office stating her blood pressure is running quite low.  She complains of being tired fatigued and weak.  And she is taking hydrochlorothiazide HCT 20/12.5 daily along with Toprol- mg daily  Blood pressure in the office today is 104/70 with a heart rate of 96 bpm.  She is significantly overweight and has not been able to successfully lose weight.  She is anticoagulated with Eliquis 5 mg twice daily.  No abnormal bleeding or bruising.    Past Surgical History:   Procedure Laterality Date   • CARDIOVASCULAR STRESS TEST  08/02/2017   • ECHO - CONVERTED  07/05/2017   • KNEE ARTHROSCOPY Right 2004   • SKIN CANCER EXCISION Left 12/2017     Family History   Problem Relation Age of Onset   • Heart attack Mother    • Heart disease Mother    • Diabetes Mother    • Kidney failure Mother    • Obesity Mother    • Kidney disease Father    • COPD Father    • Stroke Sister    • Hypertension Sister    • COPD Brother    • Heart disease Sister    • Heart attack Sister    • Heart failure Sister      Past Medical History:   Diagnosis Date   • Emphysema, unspecified (HCC)    • Hypertension    • Restless leg syndrome      Patient Active Problem List   Diagnosis   • Chest pain in adult   • Essential hypertension   • Bilateral leg edema   • Obesity, Class III, BMI 40-49.9 (morbid obesity) ([facility])   • Gouty arthritis   • Obstructive sleep apnea   • MORENO (dyspnea on exertion)   • Palpitations   • Paroxysmal atrial fibrillation (HCC)   • Chronic anticoagulation with Eliquis       Social History     Tobacco Use   • Smoking status: Never   • Smokeless tobacco: Never   Vaping Use   • Vaping Use: Never used   Substance Use Topics   • Alcohol use: No      Comment:  occ  3-4 times a year   • Drug use: No       Allergies   Allergen Reactions   • Penicillins Other (See Comments)     Unknown    • Clindamycin/Lincomycin Itching and Rash   • Sulfa Antibiotics Itching and Rash   • Vancomycin Itching and Rash       Current Outpatient Medications on File Prior to Visit   Medication Sig   • albuterol (PROAIR HFA) 108 (90 Base) MCG/ACT inhaler Inhale 2 puffs Every 4 (Four) Hours As Needed for Shortness of Air.   • allopurinol (ZYLOPRIM) 100 MG tablet Take 100 mg by mouth Daily.   • cetirizine (zyrTEC) 10 MG tablet Take 10 mg by mouth Daily.   • cholecalciferol (VITAMIN D3) 1000 units tablet Take 1,000 Units by mouth Daily.   • Eliquis 5 MG tablet tablet TAKE ONE TABLET BY MOUTH EVERY 12 HOURS   • gabapentin (NEURONTIN) 300 MG capsule Take 1 capsule by mouth Daily.   • Januvia 100 MG tablet Take 100 mg by mouth Daily.   • metFORMIN (GLUCOPHAGE) 1000 MG tablet Take 1,000 mg by mouth Daily.   • Omega-3 Fatty Acids (fish oil) 1000 MG capsule capsule Take 1,000 mg by mouth Daily.   • rOPINIRole (REQUIP) 1 MG tablet Take 3 mg by mouth Every Night.     No current facility-administered medications on file prior to visit.         The following portions of the patient's history were reviewed and updated as appropriate: allergies, current medications, past family history, past medical history, past social history, past surgical history and problem list.    Review of Systems   Constitutional: Positive for malaise/fatigue.   HENT: Negative.  Negative for congestion.    Eyes: Negative.    Cardiovascular: Negative.  Negative for chest pain, cyanosis, dyspnea on exertion, irregular heartbeat, leg swelling, near-syncope, orthopnea, palpitations, paroxysmal nocturnal dyspnea and syncope.        Low blood pressure, irregular heart beat   Respiratory: Negative.  Negative for shortness of breath.    Hematologic/Lymphatic: Negative.    Musculoskeletal: Negative.    Gastrointestinal: Negative.   "  Neurological: Negative.  Negative for headaches.          Objective:     /72   Pulse 95   Ht 182.9 cm (72\")   Wt 133 kg (294 lb)   SpO2 98%   BMI 39.87 kg/m²   Cardiovascular:      PMI at left midclavicular line. Normal rate. Irregular rhythm. Normal S1. Normal S2.      Murmurs: There is no murmur.      No gallop. No click. No rub.   Pulses:     Intact distal pulses.   Edema:     Peripheral edema absent.           Lab Review  Lab Results   Component Value Date     08/01/2017    K 4.3 08/01/2017     08/01/2017    BUN 14 08/01/2017    CREATININE 0.96 08/01/2017    GLUCOSE 138 (H) 08/01/2017    CALCIUM 9.4 08/01/2017    ALT 18 08/01/2017    ALKPHOS 58 08/01/2017     No results found for: CKTOTAL  Lab Results   Component Value Date    WBC 6.58 08/01/2017    HGB 15.5 08/01/2017    HCT 46.4 08/01/2017     08/01/2017     No results found for: INR  No results found for: MG  Lab Results   Component Value Date    TSH 3.369 08/01/2017     No results found for: BNP  Lab Results   Component Value Date    CHOL 151 08/01/2017    TRIG 154 (H) 08/01/2017    HDL 29 (L) 08/01/2017    VLDL 30.8 08/01/2017    LDLHDL 3.14 08/01/2017     Lab Results   Component Value Date    LDL 91 08/01/2017         ECG 12 Lead    Date/Time: 11/13/2022 8:26 AM  Performed by: Darian Richard MD  Authorized by: Darian Richard MD   Comparison: compared with previous ECG from 8/15/2022  Similar to previous ECG  Rhythm: atrial fibrillation  Rate: normal  BPM: 95  QRS axis: normal  Other findings: non-specific ST-T wave changes    Clinical impression: abnormal EKG               I personally viewed and interpreted the patient's LAB data         Assessment:     1. Paroxysmal atrial fibrillation (HCC)    2. Essential hypertension    3. Obesity, Class III, BMI 40-49.9 (morbid obesity) (HCC)    4. Chronic anticoagulation with Eliquis          Plan:     Patient has paroxysmal atrial fibrillation however she has been " in atrial fibrillation and is on Eliquis.  She was planning to have oral surgery for that reason cardioversion was postponed.  Blood pressure is running quite low she was advised to decrease lisinopril HCT half tablet daily she will continue Toprol-XL for rate control and Eliquis 5 twice daily.    Patient will be started on flecainide and then cardioversion in 2 to 3 weeks if still in atrial fibrillation.  Follow-up scheduled        No follow-ups on file.

## 2022-11-13 PROBLEM — Z79.01 CHRONIC ANTICOAGULATION: Status: ACTIVE | Noted: 2022-11-13

## 2022-11-14 ENCOUNTER — TELEPHONE (OUTPATIENT)
Dept: CARDIOLOGY | Facility: CLINIC | Age: 57
End: 2022-11-14

## 2022-11-14 RX ORDER — FLECAINIDE ACETATE 50 MG/1
25 TABLET ORAL 2 TIMES DAILY
Qty: 60 TABLET | Refills: 0 | Status: SHIPPED | OUTPATIENT
Start: 2022-11-14 | End: 2022-12-14 | Stop reason: SDUPTHER

## 2022-11-14 NOTE — TELEPHONE ENCOUNTER
Per verbal order Dr Richard.  Called patient to start on Flecanide 50mg 1/2 tab BID.  Come in for an EKG 2 days after starting medication.  Patient agreed and v/u.

## 2022-11-17 ENCOUNTER — TRANSCRIBE ORDERS (OUTPATIENT)
Dept: ADMINISTRATIVE | Facility: HOSPITAL | Age: 57
End: 2022-11-17

## 2022-11-17 DIAGNOSIS — N18.31 STAGE 3A CHRONIC KIDNEY DISEASE: Primary | ICD-10-CM

## 2022-11-23 ENCOUNTER — CLINICAL SUPPORT (OUTPATIENT)
Dept: CARDIOLOGY | Facility: CLINIC | Age: 57
End: 2022-11-23

## 2022-11-23 DIAGNOSIS — I48.0 PAROXYSMAL ATRIAL FIBRILLATION: Primary | ICD-10-CM

## 2022-11-23 PROCEDURE — 93000 ELECTROCARDIOGRAM COMPLETE: CPT | Performed by: INTERNAL MEDICINE

## 2022-11-23 NOTE — PROGRESS NOTES
Procedure     ECG 12 Lead    Date/Time: 11/23/2022 2:27 PM  Performed by: Darian Richard MD  Authorized by: Darian Richard MD   Comparison: compared with previous ECG from 11/16/2022  Comparison to previous ECG: No significant change, QTC was 412  Rhythm: sinus rhythm  Rate: normal  BPM: 63  Conduction: conduction normal  ST Segments: ST segments normal  T Waves: T waves normal  QRS axis: normal  Other: no other findings    Clinical impression: normal ECG  Comments:

## 2022-12-14 RX ORDER — FLECAINIDE ACETATE 50 MG/1
25 TABLET ORAL 2 TIMES DAILY
Qty: 60 TABLET | Refills: 0 | Status: SHIPPED | OUTPATIENT
Start: 2022-12-14 | End: 2022-12-20

## 2022-12-14 NOTE — TELEPHONE ENCOUNTER
Caller: SulyConi chandler    Relationship: Self    Best call back number: [phone]    Requested Prescriptions:   Requested Prescriptions     Pending Prescriptions Disp Refills   • flecainide (TAMBOCOR) 50 MG tablet 60 tablet 0     Sig: Take 0.5 tablets by mouth 2 (Two) Times a Day.        Pharmacy where request should be sent: [facility] [place] [address]    [phone]    Additional details provided by patient: PT WAS TAKING A HALF OF A PILL. SHE IS NOW TAKING A FULL PILL AT A TIME. SHE IS NEEDING A NEW PRESCRIPTION SENT IN NOW THAT SHE IS TAKING WHOLE TABLETS. SHE STATES SHE HAS 3-5 DAYS LEFT OF THIS MEDICATION.    Does the patient have less than a 3 day supply:  [x] Yes  [] No    Would you like a call back once the refill request has been completed: [] Yes [x] No    If the office needs to give you a call back, can they leave a voicemail: [x] Yes [] No    Alex Zambrano Rep   12/14/22 11:20 EST

## 2022-12-20 RX ORDER — FLECAINIDE ACETATE 50 MG/1
TABLET ORAL
Qty: 60 TABLET | Refills: 0 | Status: SHIPPED | OUTPATIENT
Start: 2022-12-20 | End: 2022-12-20 | Stop reason: SDUPTHER

## 2022-12-20 RX ORDER — FLECAINIDE ACETATE 50 MG/1
50 TABLET ORAL 2 TIMES DAILY
Qty: 60 TABLET | Refills: 0 | Status: SHIPPED | OUTPATIENT
Start: 2022-12-20 | End: 2023-01-12

## 2022-12-20 NOTE — TELEPHONE ENCOUNTER
Patient returned call stating that she is taking flecainide 50mg BID not 25mg BID. Confirmed verbal with Dr. Richard that it is okay for patient to take Flecainide 50mg BID. Sent rx to pharmacy

## 2023-01-12 RX ORDER — FLECAINIDE ACETATE 50 MG/1
TABLET ORAL
Qty: 60 TABLET | Refills: 0 | Status: SHIPPED | OUTPATIENT
Start: 2023-01-12 | End: 2023-02-14 | Stop reason: SDUPTHER

## 2023-02-14 ENCOUNTER — OFFICE VISIT (OUTPATIENT)
Dept: CARDIOLOGY | Facility: CLINIC | Age: 58
End: 2023-02-14
Payer: MEDICAID

## 2023-02-14 VITALS
OXYGEN SATURATION: 99 % | HEART RATE: 72 BPM | BODY MASS INDEX: 39.68 KG/M2 | DIASTOLIC BLOOD PRESSURE: 77 MMHG | HEIGHT: 72 IN | WEIGHT: 293 LBS | SYSTOLIC BLOOD PRESSURE: 114 MMHG

## 2023-02-14 DIAGNOSIS — I10 ESSENTIAL HYPERTENSION: ICD-10-CM

## 2023-02-14 DIAGNOSIS — Z79.01 CHRONIC ANTICOAGULATION: ICD-10-CM

## 2023-02-14 DIAGNOSIS — E66.01 OBESITY, CLASS III, BMI 40-49.9 (MORBID OBESITY): ICD-10-CM

## 2023-02-14 DIAGNOSIS — E78.2 MIXED HYPERLIPIDEMIA: ICD-10-CM

## 2023-02-14 DIAGNOSIS — I48.0 PAROXYSMAL ATRIAL FIBRILLATION: Primary | ICD-10-CM

## 2023-02-14 PROCEDURE — 99214 OFFICE O/P EST MOD 30 MIN: CPT | Performed by: INTERNAL MEDICINE

## 2023-02-14 RX ORDER — FLECAINIDE ACETATE 50 MG/1
50 TABLET ORAL 2 TIMES DAILY
Qty: 60 TABLET | Refills: 0 | Status: SHIPPED | OUTPATIENT
Start: 2023-02-14 | End: 2023-03-20

## 2023-02-14 RX ORDER — ROPINIROLE 3 MG/1
3 TABLET, FILM COATED ORAL DAILY
COMMUNITY
Start: 2023-02-01

## 2023-02-14 RX ORDER — ROSUVASTATIN CALCIUM 5 MG/1
5 TABLET, COATED ORAL DAILY
Qty: 90 TABLET | Refills: 3 | Status: SHIPPED | OUTPATIENT
Start: 2023-02-14

## 2023-02-14 NOTE — PROGRESS NOTES
subjective     Chief Complaint   Patient presents with   • Follow-up     3 mo follow up   • Med Management     Verbally reviewed medications with patient. Patient is tolerating medications well.      History of Present Illness  Patient is 57 years old white female who is here for cardiology follow-up.  She has history of paroxysmal atrial fibrillation.  She is maintaining sinus rhythm with Tambocor 50 twice daily and is anticoagulated with Eliquis 5 twice daily.  No abnormal bleeding or bruising.    No drug side effects from Tambocor.  She is also taking Toprol-XL 50 mg daily and heart rate is around 70.    Hypertension well-controlled on low-dose lisinopril HCT    Hyperlipidemia on Crestor.  She is also overweight and has obstructive sleep apnea.  Further weight loss was emphasized.    Past Surgical History:   Procedure Laterality Date   • CARDIOVASCULAR STRESS TEST  08/02/2017   • ECHO - CONVERTED  07/05/2017   • KNEE ARTHROSCOPY Right 2004   • SKIN CANCER EXCISION Left 12/2017     Family History   Problem Relation Age of Onset   • Heart attack Mother    • Heart disease Mother    • Diabetes Mother    • Kidney failure Mother    • Obesity Mother    • Kidney disease Father    • COPD Father    • Stroke Sister    • Hypertension Sister    • COPD Brother    • Heart disease Sister    • Heart attack Sister    • Heart failure Sister      Past Medical History:   Diagnosis Date   • Emphysema, unspecified (HCC)    • Hypertension    • Restless leg syndrome      Patient Active Problem List   Diagnosis   • Chest pain in adult   • Essential hypertension   • Bilateral leg edema   • Obesity, Class III, BMI 40-49.9 (morbid obesity) (HCC)   • Gouty arthritis   • Obstructive sleep apnea   • MORENO (dyspnea on exertion)   • Palpitations   • Paroxysmal atrial fibrillation (HCC)   • Chronic anticoagulation with Eliquis   • Mixed hyperlipidemia       Social History     Tobacco Use   • Smoking status: Never   • Smokeless tobacco: Never    Vaping Use   • Vaping Use: Never used   Substance Use Topics   • Alcohol use: No     Comment:  occ  3-4 times a year   • Drug use: No       Allergies   Allergen Reactions   • Cozaar [Losartan] Hives   • Levaquin [Levofloxacin] Hives   • Penicillins Other (See Comments)     Unknown    • Clindamycin/Lincomycin Itching and Rash   • Sulfa Antibiotics Itching and Rash   • Vancomycin Itching and Rash       Current Outpatient Medications on File Prior to Visit   Medication Sig   • albuterol (PROAIR HFA) 108 (90 Base) MCG/ACT inhaler Inhale 2 puffs Every 4 (Four) Hours As Needed for Shortness of Air.   • allopurinol (ZYLOPRIM) 100 MG tablet Take 100 mg by mouth Daily.   • cetirizine (zyrTEC) 10 MG tablet Take 10 mg by mouth Daily.   • cholecalciferol (VITAMIN D3) 1000 units tablet Take 1,000 Units by mouth Daily.   • Eliquis 5 MG tablet tablet TAKE ONE TABLET BY MOUTH EVERY 12 HOURS   • Januvia 100 MG tablet Take 100 mg by mouth Daily.   • lisinopril-hydrochlorothiazide (PRINZIDE,ZESTORETIC) 20-12.5 MG per tablet Take 0.5 tablets by mouth Daily.   • metFORMIN (GLUCOPHAGE) 1000 MG tablet Take 1,000 mg by mouth Daily.   • metoprolol succinate XL (TOPROL-XL) 25 MG 24 hr tablet Take 2 tablets by mouth Daily.   • Omega-3 Fatty Acids (fish oil) 1000 MG capsule capsule Take 1,000 mg by mouth Daily.   • rOPINIRole (REQUIP) 3 MG tablet Take 3 mg by mouth Daily.   • gabapentin (NEURONTIN) 300 MG capsule Take 1 capsule by mouth Daily.   • rOPINIRole (REQUIP) 1 MG tablet Take 3 mg by mouth Every Night.     No current facility-administered medications on file prior to visit.         The following portions of the patient's history were reviewed and updated as appropriate: allergies, current medications, past family history, past medical history, past social history, past surgical history and problem list.    Review of Systems   Constitutional: Negative.   HENT: Negative.  Negative for congestion.    Eyes: Negative.    Cardiovascular:  "Negative.  Negative for chest pain, cyanosis, dyspnea on exertion, irregular heartbeat, leg swelling, near-syncope, orthopnea, palpitations, paroxysmal nocturnal dyspnea and syncope.   Respiratory: Negative.  Negative for shortness of breath.    Hematologic/Lymphatic: Negative.    Musculoskeletal: Negative.    Gastrointestinal: Negative.    Neurological: Negative.  Negative for headaches.          Objective:     /77 (BP Location: Left arm, Patient Position: Sitting, Cuff Size: Large Adult)   Pulse 72   Ht 182.9 cm (72\")   Wt 135 kg (296 lb 9.6 oz)   SpO2 99%   BMI 40.23 kg/m²   Pulmonary:      Effort: Pulmonary effort is normal.      Breath sounds: Normal breath sounds. No stridor. No wheezing. No rhonchi. No rales.   Cardiovascular:      PMI at left midclavicular line. Normal rate. Regular rhythm. Normal S1. Normal S2.      Murmurs: There is no murmur.      No gallop. No click. No rub.   Pulses:     Intact distal pulses.   Edema:     Peripheral edema absent.           Lab Review  Lab Results   Component Value Date     08/01/2017    K 4.3 08/01/2017     08/01/2017    BUN 14 08/01/2017    CREATININE 0.96 08/01/2017    GLUCOSE 138 (H) 08/01/2017    CALCIUM 9.4 08/01/2017    ALT 18 08/01/2017    ALKPHOS 58 08/01/2017     No results found for: CKTOTAL  Lab Results   Component Value Date    WBC 6.58 08/01/2017    HGB 15.5 08/01/2017    HCT 46.4 08/01/2017     08/01/2017     No results found for: INR  No results found for: MG  Lab Results   Component Value Date    TSH 3.369 08/01/2017     No results found for: BNP  Lab Results   Component Value Date    CHOL 151 08/01/2017    TRIG 154 (H) 08/01/2017    HDL 29 (L) 08/01/2017    VLDL 30.8 08/01/2017    LDLHDL 3.14 08/01/2017     Lab Results   Component Value Date    LDL 91 08/01/2017     No results found for: PROBNP  CARDIAC LABS:          Procedures       I personally viewed and interpreted the patient's LAB data         Assessment:     1. " Paroxysmal atrial fibrillation (HCC)    2. Mixed hyperlipidemia    3. Chronic anticoagulation with Eliquis    4. Essential hypertension    5. Obesity, Class III, BMI 40-49.9 (morbid obesity) (HCC)          Plan:     Paroxysmal atrial fibrillation  She is maintaining sinus rhythm with Tambocor which was continued no drug side effects.  Heart rate is controlled with Toprol-XL 50 mg daily which was also continued.    Chronic anticoagulation with Eliquis.  No abnormal bleeding or bruising.  Eliquis 5 twice daily was continued.  Hypertension is also well controlled she will continue lisinopril HCT 20/12 point 5/2 tablet daily.    Morbid obesity weight loss was emphasized.  Follow-up scheduled        No follow-ups on file.

## 2023-03-03 RX ORDER — APIXABAN 5 MG/1
TABLET, FILM COATED ORAL
Qty: 60 TABLET | Refills: 1 | Status: SHIPPED | OUTPATIENT
Start: 2023-03-03

## 2023-03-20 RX ORDER — FLECAINIDE ACETATE 50 MG/1
TABLET ORAL
Qty: 60 TABLET | Refills: 0 | Status: SHIPPED | OUTPATIENT
Start: 2023-03-20

## 2023-04-20 RX ORDER — FLECAINIDE ACETATE 50 MG/1
TABLET ORAL
Qty: 60 TABLET | Refills: 0 | Status: SHIPPED | OUTPATIENT
Start: 2023-04-20

## 2023-05-08 RX ORDER — APIXABAN 5 MG/1
TABLET, FILM COATED ORAL
Qty: 60 TABLET | Refills: 1 | Status: SHIPPED | OUTPATIENT
Start: 2023-05-08

## 2023-05-15 ENCOUNTER — OFFICE VISIT (OUTPATIENT)
Dept: CARDIOLOGY | Facility: CLINIC | Age: 58
End: 2023-05-15
Payer: MEDICARE

## 2023-05-15 VITALS
OXYGEN SATURATION: 98 % | SYSTOLIC BLOOD PRESSURE: 116 MMHG | HEART RATE: 65 BPM | BODY MASS INDEX: 39.28 KG/M2 | DIASTOLIC BLOOD PRESSURE: 76 MMHG | HEIGHT: 72 IN | WEIGHT: 290 LBS

## 2023-05-15 DIAGNOSIS — E66.01 OBESITY, CLASS III, BMI 40-49.9 (MORBID OBESITY): ICD-10-CM

## 2023-05-15 DIAGNOSIS — I10 ESSENTIAL HYPERTENSION: Primary | ICD-10-CM

## 2023-05-15 DIAGNOSIS — Z79.01 CHRONIC ANTICOAGULATION: ICD-10-CM

## 2023-05-15 DIAGNOSIS — I48.0 PAROXYSMAL ATRIAL FIBRILLATION: ICD-10-CM

## 2023-05-15 DIAGNOSIS — E78.2 MIXED HYPERLIPIDEMIA: ICD-10-CM

## 2023-05-15 PROCEDURE — 99214 OFFICE O/P EST MOD 30 MIN: CPT | Performed by: INTERNAL MEDICINE

## 2023-05-15 PROCEDURE — 3078F DIAST BP <80 MM HG: CPT | Performed by: INTERNAL MEDICINE

## 2023-05-15 PROCEDURE — 3074F SYST BP LT 130 MM HG: CPT | Performed by: INTERNAL MEDICINE

## 2023-05-15 PROCEDURE — 93000 ELECTROCARDIOGRAM COMPLETE: CPT | Performed by: INTERNAL MEDICINE

## 2023-05-15 NOTE — LETTER
May 15, 2023     LOLIS Kong  [address]  [facility] [address]    Patient: Coni Tubbs   YOB: 1965   Date of Visit: 5/15/2023       Dear LOLIS Kong    Coni Tubbs was in my office today. Below is a copy of my note.    If you have questions, please do not hesitate to call me. I look forward to following Coni along with you.         Sincerely,        Darian Richard MD        CC: Simeon Aldrich Sr., DMD    subjective     Chief Complaint   Patient presents with   • Atrial Fibrillation     Follow up       History of Present Illness          Past Surgical History:   Procedure Laterality Date   • CARDIOVASCULAR STRESS TEST  08/02/2017   • ECHO - CONVERTED  07/05/2017   • KNEE ARTHROSCOPY Right 2004   • SKIN CANCER EXCISION Left 12/2017     Family History   Problem Relation Age of Onset   • Heart attack Mother    • Heart disease Mother    • Diabetes Mother    • Kidney failure Mother    • Obesity Mother    • Kidney disease Father    • COPD Father    • Stroke Sister    • Hypertension Sister    • COPD Brother    • Heart disease Sister    • Heart attack Sister    • Heart failure Sister      Past Medical History:   Diagnosis Date   • Emphysema, unspecified    • Hypertension    • Restless leg syndrome      Patient Active Problem List   Diagnosis   • Chest pain in adult   • Essential hypertension   • Bilateral leg edema   • Obesity, Class III, BMI 40-49.9 (morbid obesity)   • Gouty arthritis   • Obstructive sleep apnea   • MORENO (dyspnea on exertion)   • Palpitations   • Paroxysmal atrial fibrillation   • Chronic anticoagulation with Eliquis   • Mixed hyperlipidemia       Social History     Tobacco Use   • Smoking status: Never   • Smokeless tobacco: Never   Vaping Use   • Vaping Use: Never used   Substance Use Topics   • Alcohol use: No     Comment:  occ  3-4 times a year   • Drug use: No       Allergies   Allergen Reactions   • Cozaar [Losartan] Hives   • Levaquin  "[Levofloxacin] Hives   • Penicillins Other (See Comments)     Unknown    • Clindamycin/Lincomycin Itching and Rash   • Sulfa Antibiotics Itching and Rash   • Vancomycin Itching and Rash       Current Outpatient Medications on File Prior to Visit   Medication Sig   • albuterol (PROAIR HFA) 108 (90 Base) MCG/ACT inhaler Inhale 2 puffs Every 4 (Four) Hours As Needed for Shortness of Air.   • allopurinol (ZYLOPRIM) 100 MG tablet Take 1 tablet by mouth Daily.   • cetirizine (zyrTEC) 10 MG tablet Take 1 tablet by mouth Daily.   • cholecalciferol (VITAMIN D3) 1000 units tablet Take 1 tablet by mouth Daily.   • Eliquis 5 MG tablet tablet TAKE ONE TABLET BY MOUTH EVERY 12 HOURS   • flecainide (TAMBOCOR) 50 MG tablet TAKE ONE TABLET BY MOUTH TWICE DAILY   • gabapentin (NEURONTIN) 300 MG capsule Take 1 capsule by mouth Daily.   • Januvia 100 MG tablet Take 1 tablet by mouth Daily.   • lisinopril-hydrochlorothiazide (PRINZIDE,ZESTORETIC) 20-12.5 MG per tablet Take 0.5 tablets by mouth Daily.   • metFORMIN (GLUCOPHAGE) 1000 MG tablet Take 1 tablet by mouth Daily.   • metoprolol succinate XL (TOPROL-XL) 25 MG 24 hr tablet Take 2 tablets by mouth Daily.   • Omega-3 Fatty Acids (fish oil) 1000 MG capsule capsule Take 1 capsule by mouth Daily.   • rOPINIRole (REQUIP) 1 MG tablet Take 3 tablets by mouth Every Night.   • rOPINIRole (REQUIP) 3 MG tablet Take 1 tablet by mouth Daily.   • rosuvastatin (CRESTOR) 5 MG tablet Take 1 tablet by mouth Daily.     No current facility-administered medications on file prior to visit.         The following portions of the patient's history were reviewed and updated as appropriate: allergies, current medications, past family history, past medical history, past social history, past surgical history and problem list.    ROS      Objective:     /76 (BP Location: Left arm, Patient Position: Sitting, Cuff Size: Adult)   Pulse 65   Ht 182.9 cm (72\")   Wt 132 kg (290 lb)   SpO2 98%   BMI 39.33 " kg/m²   Physical Exam      Lab Review  Lab Results   Component Value Date     08/01/2017    K 4.3 08/01/2017     08/01/2017    BUN 14 08/01/2017    CREATININE 0.96 08/01/2017    GLUCOSE 138 (H) 08/01/2017    CALCIUM 9.4 08/01/2017    ALT 18 08/01/2017    ALKPHOS 58 08/01/2017     No results found for: CKTOTAL  Lab Results   Component Value Date    WBC 6.58 08/01/2017    HGB 15.5 08/01/2017    HCT 46.4 08/01/2017     08/01/2017     No results found for: INR  No results found for: MG  Lab Results   Component Value Date    TSH 3.369 08/01/2017     No results found for: BNP  Lab Results   Component Value Date    CHOL 151 08/01/2017    TRIG 154 (H) 08/01/2017    HDL 29 (L) 08/01/2017    VLDL 30.8 08/01/2017    LDLHDL 3.14 08/01/2017     Lab Results   Component Value Date    LDL 91 08/01/2017     No results found for: PROBNP            Procedures       I personally viewed and interpreted the patient's LAB data        Assessment:     1. Essential hypertension    2. Mixed hyperlipidemia    3. Paroxysmal atrial fibrillation    4. Chronic anticoagulation with Eliquis    5. Obesity, Class III, BMI 40-49.9 (morbid obesity)          Plan:              No follow-ups on file.

## 2023-05-15 NOTE — PROGRESS NOTES
subjective     Chief Complaint   Patient presents with   • Atrial Fibrillation     Follow up       History of Present Illness    Marlee is 57 years old white female who is here for cardiology follow-up.    Paroxysmal atrial fibrillation  She is maintaining sinus rhythm on Tambocor 50 twice daily and metoprolol ER 50 mg daily.  She is doing very well however she had few episodes and heartbeat was fast and she had to take extra metoprolol.  She is now in sinus rhythm with a heart rate around 60 bpm.    He is anticoagulated with Eliquis 5 twice daily no drug side effects.    Hypertension  Blood pressure is well controlled with lisinopril HCT 20/12.5 mg half tablet daily.    Hyperlipidemia  She is taking Crestor 5 mg daily.  She had lab work done which will be reviewed.    She denies any chest pain palpitations shortness of breath dizziness or syncope.      Past Surgical History:   Procedure Laterality Date   • CARDIOVASCULAR STRESS TEST  08/02/2017   • ECHO - CONVERTED  07/05/2017   • KNEE ARTHROSCOPY Right 2004   • SKIN CANCER EXCISION Left 12/2017     Family History   Problem Relation Age of Onset   • Heart attack Mother    • Heart disease Mother    • Diabetes Mother    • Kidney failure Mother    • Obesity Mother    • Kidney disease Father    • COPD Father    • Stroke Sister    • Hypertension Sister    • COPD Brother    • Heart disease Sister    • Heart attack Sister    • Heart failure Sister      Past Medical History:   Diagnosis Date   • Emphysema, unspecified    • Hypertension    • Restless leg syndrome      Patient Active Problem List   Diagnosis   • Chest pain in adult   • Essential hypertension   • Bilateral leg edema   • Obesity, Class III, BMI 40-49.9 (morbid obesity)   • Gouty arthritis   • Obstructive sleep apnea   • MORENO (dyspnea on exertion)   • Palpitations   • Paroxysmal atrial fibrillation   • Chronic anticoagulation with Eliquis   • Mixed hyperlipidemia       Social History     Tobacco Use   • Smoking  status: Never   • Smokeless tobacco: Never   Vaping Use   • Vaping Use: Never used   Substance Use Topics   • Alcohol use: No     Comment:  occ  3-4 times a year   • Drug use: No       Allergies   Allergen Reactions   • Cozaar [Losartan] Hives   • Levaquin [Levofloxacin] Hives   • Penicillins Other (See Comments)     Unknown    • Clindamycin/Lincomycin Itching and Rash   • Sulfa Antibiotics Itching and Rash   • Vancomycin Itching and Rash       Current Outpatient Medications on File Prior to Visit   Medication Sig   • albuterol (PROAIR HFA) 108 (90 Base) MCG/ACT inhaler Inhale 2 puffs Every 4 (Four) Hours As Needed for Shortness of Air.   • allopurinol (ZYLOPRIM) 100 MG tablet Take 1 tablet by mouth Daily.   • cetirizine (zyrTEC) 10 MG tablet Take 1 tablet by mouth Daily.   • cholecalciferol (VITAMIN D3) 1000 units tablet Take 1 tablet by mouth Daily.   • Eliquis 5 MG tablet tablet TAKE ONE TABLET BY MOUTH EVERY 12 HOURS   • flecainide (TAMBOCOR) 50 MG tablet TAKE ONE TABLET BY MOUTH TWICE DAILY   • gabapentin (NEURONTIN) 300 MG capsule Take 1 capsule by mouth Daily.   • Januvia 100 MG tablet Take 1 tablet by mouth Daily.   • lisinopril-hydrochlorothiazide (PRINZIDE,ZESTORETIC) 20-12.5 MG per tablet Take 0.5 tablets by mouth Daily.   • metFORMIN (GLUCOPHAGE) 1000 MG tablet Take 1 tablet by mouth Daily.   • metoprolol succinate XL (TOPROL-XL) 25 MG 24 hr tablet Take 2 tablets by mouth Daily.   • Omega-3 Fatty Acids (fish oil) 1000 MG capsule capsule Take 1 capsule by mouth Daily.   • rOPINIRole (REQUIP) 1 MG tablet Take 3 tablets by mouth Every Night.   • rOPINIRole (REQUIP) 3 MG tablet Take 1 tablet by mouth Daily.   • rosuvastatin (CRESTOR) 5 MG tablet Take 1 tablet by mouth Daily.     No current facility-administered medications on file prior to visit.         The following portions of the patient's history were reviewed and updated as appropriate: allergies, current medications, past family history, past medical  "history, past social history, past surgical history and problem list.    Review of Systems   Constitutional: Negative.   HENT: Negative.  Negative for congestion.    Eyes: Negative.    Cardiovascular: Positive for palpitations. Negative for chest pain, cyanosis, dyspnea on exertion, irregular heartbeat, leg swelling, near-syncope, orthopnea, paroxysmal nocturnal dyspnea and syncope.   Respiratory: Negative.  Negative for shortness of breath.    Hematologic/Lymphatic: Negative.    Musculoskeletal: Negative.    Gastrointestinal: Negative.    Neurological: Negative.  Negative for headaches.          Objective:     /76 (BP Location: Left arm, Patient Position: Sitting, Cuff Size: Adult)   Pulse 65   Ht 182.9 cm (72\")   Wt 132 kg (290 lb)   SpO2 98%   BMI 39.33 kg/m²   Pulmonary:      Effort: Pulmonary effort is normal.      Breath sounds: Normal breath sounds. No stridor. No wheezing. No rhonchi. No rales.   Cardiovascular:      PMI at left midclavicular line. Normal rate. Regular rhythm. Normal S1. Normal S2.      Murmurs: There is no murmur.      No gallop. No click. No rub.   Pulses:     Intact distal pulses.   Edema:     Peripheral edema absent.           Lab Review                    ECG 12 Lead    Date/Time: 5/15/2023 11:49 AM  Performed by: Darian Richard MD  Authorized by: Darian Richard MD   Comparison: compared with previous ECG from 11/23/2022  Similar to previous ECG  Rhythm: sinus rhythm  Rate: normal  BPM: 69  Conduction: conduction normal  ST Segments: ST segments normal  T Waves: T waves normal  QRS axis: normal  Other: no other findings    Clinical impression: normal ECG  Comments: QTc 406               I personally viewed and interpreted the patient's LAB data         Assessment:     1. Essential hypertension    2. Mixed hyperlipidemia    3. Paroxysmal atrial fibrillation    4. Chronic anticoagulation with Eliquis    5. Obesity, Class III, BMI 40-49.9 (morbid obesity)  "         Plan:     Hypertension  Blood pressure is very well controlled she was advised to continue lisinopril HCT 20/12 point 5/2 tablet daily.    Paroxysmal atrial fibrillation  Maintaining sinus rhythm on Tambocor QTc is normal.    Heart rate is 69 bpm she will continue Toprol XL 50 mg daily along with a Tambocor.    Anticoagulation with Eliquis was continued.  Lab work reviewed renal functions are normal.    Hyperlipidemia  Lipids are normal.  Total cholesterol is 97 with LDL of 41.  She wants to cut down her Crestor.  She was advised to take Crestor 5 mg every other day.  Follow-up scheduled      No follow-ups on file.

## 2023-05-24 RX ORDER — FLECAINIDE ACETATE 50 MG/1
TABLET ORAL
Qty: 60 TABLET | Refills: 0 | Status: SHIPPED | OUTPATIENT
Start: 2023-05-24

## 2023-06-01 RX ORDER — METOPROLOL SUCCINATE 25 MG/1
TABLET, EXTENDED RELEASE ORAL
Qty: 180 TABLET | Refills: 1 | Status: SHIPPED | OUTPATIENT
Start: 2023-06-01

## 2023-08-25 ENCOUNTER — OFFICE VISIT (OUTPATIENT)
Dept: CARDIOLOGY | Facility: CLINIC | Age: 58
End: 2023-08-25
Payer: MEDICARE

## 2023-08-25 VITALS
SYSTOLIC BLOOD PRESSURE: 118 MMHG | WEIGHT: 291 LBS | DIASTOLIC BLOOD PRESSURE: 78 MMHG | HEIGHT: 72 IN | OXYGEN SATURATION: 98 % | HEART RATE: 62 BPM | BODY MASS INDEX: 39.42 KG/M2

## 2023-08-25 DIAGNOSIS — E66.01 OBESITY, CLASS III, BMI 40-49.9 (MORBID OBESITY): ICD-10-CM

## 2023-08-25 DIAGNOSIS — E78.2 MIXED HYPERLIPIDEMIA: ICD-10-CM

## 2023-08-25 DIAGNOSIS — I48.0 PAROXYSMAL ATRIAL FIBRILLATION: Primary | ICD-10-CM

## 2023-08-25 DIAGNOSIS — I10 ESSENTIAL HYPERTENSION: ICD-10-CM

## 2023-08-25 DIAGNOSIS — Z79.01 CHRONIC ANTICOAGULATION: ICD-10-CM

## 2023-08-25 RX ORDER — LISINOPRIL 5 MG/1
5 TABLET ORAL DAILY
Qty: 90 TABLET | Refills: 1 | Status: SHIPPED | OUTPATIENT
Start: 2023-08-25

## 2023-08-25 RX ORDER — FLECAINIDE ACETATE 100 MG/1
100 TABLET ORAL 2 TIMES DAILY
Qty: 180 TABLET | Refills: 1 | Status: SHIPPED | OUTPATIENT
Start: 2023-08-25

## 2023-08-25 RX ORDER — HYDROCHLOROTHIAZIDE 12.5 MG/1
12.5 CAPSULE, GELATIN COATED ORAL DAILY
Qty: 90 CAPSULE | Refills: 1 | Status: SHIPPED | OUTPATIENT
Start: 2023-08-25

## 2023-08-25 NOTE — PROGRESS NOTES
subjective     Chief Complaint   Patient presents with    Hypertension     Follow up    Atrial Fibrillation     Follow up     History of Present Illness    Patient is 58 years old white male who is here for regular follow-up.  She states that she is feeling weak tired and sleepy but otherwise asymptomatic.  No chest pain palpitations or shortness of breath.  She is retired and states that she gets bored very easily because there is nothing to do.        Blood pressure is running slightly low    She is taking lisinopril HCT 20/12.5 mg half tablet daily    Paroxysmal atrial fibrillation.  She has been maintaining sinus rhythm with flecainide and is anticoagulated with Eliquis.  Heart rate is controlled with metoprolol ER 25 mg daily.      Hyperlipidemia on Crestor no drug side effects.    She is overweight and is diabetic.  She is taking metformin and Januvia      Past Surgical History:   Procedure Laterality Date    CARDIOVASCULAR STRESS TEST  08/02/2017    ECHO - CONVERTED  07/05/2017    KNEE ARTHROSCOPY Right 2004    SKIN CANCER EXCISION Left 12/2017     Family History   Problem Relation Age of Onset    Heart attack Mother     Heart disease Mother     Diabetes Mother     Kidney failure Mother     Obesity Mother     Kidney disease Father     COPD Father     Stroke Sister     Hypertension Sister     COPD Brother     Heart disease Sister     Heart attack Sister     Heart failure Sister      Past Medical History:   Diagnosis Date    Emphysema, unspecified     Hypertension     Restless leg syndrome      Patient Active Problem List   Diagnosis    Chest pain in adult    Essential hypertension    Bilateral leg edema    Obesity, Class III, BMI 40-49.9 (morbid obesity)    Gouty arthritis    Obstructive sleep apnea    MORENO (dyspnea on exertion)    Palpitations    Paroxysmal atrial fibrillation    Chronic anticoagulation with Eliquis    Mixed hyperlipidemia       Social History     Tobacco Use    Smoking status: Never     Smokeless tobacco: Never   Vaping Use    Vaping Use: Never used   Substance Use Topics    Alcohol use: No     Comment:  occ  3-4 times a year    Drug use: No       Allergies   Allergen Reactions    Cozaar [Losartan] Hives    Levaquin [Levofloxacin] Hives    Penicillins Other (See Comments)     Unknown     Clindamycin/Lincomycin Itching and Rash    Sulfa Antibiotics Itching and Rash    Vancomycin Itching and Rash       Current Outpatient Medications on File Prior to Visit   Medication Sig    albuterol (PROAIR HFA) 108 (90 Base) MCG/ACT inhaler Inhale 2 puffs Every 4 (Four) Hours As Needed for Shortness of Air.    allopurinol (ZYLOPRIM) 100 MG tablet Take 1 tablet by mouth Daily.    cetirizine (zyrTEC) 10 MG tablet Take 1 tablet by mouth Daily.    cholecalciferol (VITAMIN D3) 1000 units tablet Take 1 tablet by mouth Daily.    Eliquis 5 MG tablet tablet TAKE ONE TABLET BY MOUTH EVERY 12 HOURS    gabapentin (NEURONTIN) 300 MG capsule Take 1 capsule by mouth Daily.    Januvia 100 MG tablet Take 1 tablet by mouth Daily.    metFORMIN (GLUCOPHAGE) 1000 MG tablet Take 1 tablet by mouth Daily.    metoprolol succinate XL (TOPROL-XL) 25 MG 24 hr tablet TAKE TWO TABLETS BY MOUTH ONCE DAILY.    Omega-3 Fatty Acids (fish oil) 1000 MG capsule capsule Take 1 capsule by mouth Daily.    rOPINIRole (REQUIP) 1 MG tablet Take 3 tablets by mouth Every Night.    rOPINIRole (REQUIP) 3 MG tablet Take 1 tablet by mouth Daily.    rosuvastatin (CRESTOR) 5 MG tablet Take 1 tablet by mouth Daily.    [DISCONTINUED] flecainide (TAMBOCOR) 50 MG tablet TAKE ONE TABLET BY MOUTH TWICE DAILY    [DISCONTINUED] lisinopril-hydrochlorothiazide (PRINZIDE,ZESTORETIC) 20-12.5 MG per tablet Take 0.5 tablets by mouth Daily.     No current facility-administered medications on file prior to visit.         The following portions of the patient's history were reviewed and updated as appropriate: allergies, current medications, past family history, past medical  "history, past social history, past surgical history and problem list.    Review of Systems   Constitutional: Positive for malaise/fatigue.   HENT: Negative.  Negative for congestion.    Eyes: Negative.    Cardiovascular: Negative.  Negative for chest pain, cyanosis, dyspnea on exertion, irregular heartbeat, leg swelling, near-syncope, orthopnea, palpitations, paroxysmal nocturnal dyspnea and syncope.   Respiratory: Negative.  Negative for shortness of breath.    Hematologic/Lymphatic: Negative.    Musculoskeletal: Negative.    Gastrointestinal: Negative.    Neurological: Negative.  Negative for headaches.        Objective:     /78   Pulse 62   Ht 182.9 cm (72\")   Wt 132 kg (291 lb)   SpO2 98%   BMI 39.47 kg/mý   Pulmonary:      Effort: Pulmonary effort is normal.      Breath sounds: Normal breath sounds. No stridor. No wheezing. No rhonchi. No rales.   Cardiovascular:      PMI at left midclavicular line. Normal rate. Irregular rhythm. Normal S1. Normal S2.       Murmurs: There is no murmur.      No gallop.  No click. No rub.   Pulses:     Intact distal pulses.   Edema:     Peripheral edema absent.         Lab Review  Lab Results   Component Value Date     08/01/2017    K 4.3 08/01/2017     08/01/2017    BUN 14 08/01/2017    CREATININE 0.96 08/01/2017    GLUCOSE 138 (H) 08/01/2017    CALCIUM 9.4 08/01/2017    ALT 18 08/01/2017    ALKPHOS 58 08/01/2017     No results found for: CKTOTAL  Lab Results   Component Value Date    WBC 6.58 08/01/2017    HGB 15.5 08/01/2017    HCT 46.4 08/01/2017     08/01/2017     No results found for: INR  No results found for: MG  Lab Results   Component Value Date    TSH 3.369 08/01/2017     No results found for: BNP  Lab Results   Component Value Date    CHOL 151 08/01/2017    TRIG 154 (H) 08/01/2017    HDL 29 (L) 08/01/2017    VLDL 30.8 08/01/2017    LDLHDL 3.14 08/01/2017     Lab Results   Component Value Date    LDL 91 08/01/2017     No results found for: " PROBNP              ECG 12 Lead    Date/Time: 8/25/2023 1:24 PM  Performed by: Darian Richard MD  Authorized by: Darian Richard MD   Comparison: compared with previous ECG from 5/15/2023  Comparison to previous ECG: Atrial fibrillation is new  Rhythm: atrial fibrillation  Rate: normal  BPM: 85  Conduction: conduction normal  QRS axis: normal  Other findings: non-specific ST-T wave changes    Clinical impression: abnormal EKG           I personally viewed and interpreted the patient's LAB data         Assessment:     1. Paroxysmal atrial fibrillation    2. Mixed hyperlipidemia    3. Essential hypertension    4. Obesity, Class III, BMI 40-49.9 (morbid obesity)    5. Chronic anticoagulation with Eliquis          Plan:     Paroxysmal atrial fibrillation  Patient has history of paroxysmal atrial fibrillation and has been very well controlled with beta-blocker therapy and Tambocor however she is now has A-fib new onset with controlled ventricular rate.  She has been anticoagulant with Eliquis all this time.  External cardioversion was explained recommended and discussed with the patient.  Patient is not sure at this time.  We will increase Tambocor to 100 mg twice daily.  She will have repeat EKG in 1 week.    We will decide regarding cardioversion again.    Hyperlipidemia she will continue Crestor and fish oil.    Hypertension  Blood pressure is running low.  Lisinopril HCT was discontinued patient was given hydrochlorothiazide 12.5 mg daily and lisinopril 5 mg daily.    Chronic anticoagulation with Eliquis was continued.    Weight loss and diabetic control was discussed.  Repeat EKG in 1 week.  Follow-up scheduled        No follow-ups on file.

## 2023-09-15 ENCOUNTER — OFFICE VISIT (OUTPATIENT)
Dept: CARDIOLOGY | Facility: CLINIC | Age: 58
End: 2023-09-15
Payer: MEDICARE

## 2023-09-15 VITALS
HEIGHT: 72 IN | BODY MASS INDEX: 38.47 KG/M2 | WEIGHT: 284 LBS | HEART RATE: 68 BPM | OXYGEN SATURATION: 98 % | SYSTOLIC BLOOD PRESSURE: 129 MMHG | DIASTOLIC BLOOD PRESSURE: 84 MMHG

## 2023-09-15 DIAGNOSIS — E78.2 MIXED HYPERLIPIDEMIA: ICD-10-CM

## 2023-09-15 DIAGNOSIS — Z79.01 CHRONIC ANTICOAGULATION: ICD-10-CM

## 2023-09-15 DIAGNOSIS — I48.0 PAROXYSMAL ATRIAL FIBRILLATION: Primary | ICD-10-CM

## 2023-09-15 DIAGNOSIS — I10 ESSENTIAL HYPERTENSION: ICD-10-CM

## 2023-09-15 DIAGNOSIS — E66.01 OBESITY, CLASS III, BMI 40-49.9 (MORBID OBESITY): ICD-10-CM

## 2023-09-15 NOTE — PROGRESS NOTES
subjective     Chief Complaint   Patient presents with    Atrial Fibrillation     Follow up     History of Present Illness    Patient is 58 years old white female who is here for cardiology follow-up.  She has history of paroxysmal atrial fibrillation, hypertension, hyperlipidemia and obesity.  She is doing very well she denies any chest pain palpitations or shortness of breath.  She is anticoagulated with Eliquis.    Blood pressure is also very well controlled.  She is taking only half of lisinopril daily.    Hyperlipidemia on Crestor.  No drug side effects.  She is also overweight and is diabetic on metformin and Januvia.      Past Surgical History:   Procedure Laterality Date    CARDIOVASCULAR STRESS TEST  08/02/2017    ECHO - CONVERTED  07/05/2017    KNEE ARTHROSCOPY Right 2004    SKIN CANCER EXCISION Left 12/2017     Family History   Problem Relation Age of Onset    Heart attack Mother     Heart disease Mother     Diabetes Mother     Kidney failure Mother     Obesity Mother     Kidney disease Father     COPD Father     Stroke Sister     Hypertension Sister     COPD Brother     Heart disease Sister     Heart attack Sister     Heart failure Sister      Past Medical History:   Diagnosis Date    Emphysema, unspecified     Hypertension     Restless leg syndrome      Patient Active Problem List   Diagnosis    Chest pain in adult    Essential hypertension    Bilateral leg edema    Obesity, Class III, BMI 40-49.9 (morbid obesity)    Gouty arthritis    Obstructive sleep apnea    MORENO (dyspnea on exertion)    Palpitations    Paroxysmal atrial fibrillation    Chronic anticoagulation with Eliquis    Mixed hyperlipidemia       Social History     Tobacco Use    Smoking status: Never    Smokeless tobacco: Never   Vaping Use    Vaping Use: Never used   Substance Use Topics    Alcohol use: No     Comment:  occ  3-4 times a year    Drug use: No       Allergies   Allergen Reactions    Cozaar [Losartan] Hives    Levaquin  [Levofloxacin] Hives    Penicillins Other (See Comments)     Unknown     Clindamycin/Lincomycin Itching and Rash    Sulfa Antibiotics Itching and Rash    Vancomycin Itching and Rash       Current Outpatient Medications on File Prior to Visit   Medication Sig    albuterol (PROAIR HFA) 108 (90 Base) MCG/ACT inhaler Inhale 2 puffs Every 4 (Four) Hours As Needed for Shortness of Air.    allopurinol (ZYLOPRIM) 100 MG tablet Take 1 tablet by mouth Daily.    cetirizine (zyrTEC) 10 MG tablet Take 1 tablet by mouth Daily.    cholecalciferol (VITAMIN D3) 1000 units tablet Take 1 tablet by mouth Daily.    Eliquis 5 MG tablet tablet TAKE ONE TABLET BY MOUTH EVERY 12 HOURS    flecainide (TAMBOCOR) 100 MG tablet Take 1 tablet by mouth 2 (Two) Times a Day.    gabapentin (NEURONTIN) 300 MG capsule Take 1 capsule by mouth Daily.    hydroCHLOROthiazide (MICROZIDE) 12.5 MG capsule Take 1 capsule by mouth Daily.    Januvia 100 MG tablet Take 1 tablet by mouth Daily.    lisinopril (PRINIVIL,ZESTRIL) 5 MG tablet Take 1 tablet by mouth Daily.    metFORMIN (GLUCOPHAGE) 1000 MG tablet Take 1 tablet by mouth Daily.    metoprolol succinate XL (TOPROL-XL) 25 MG 24 hr tablet TAKE TWO TABLETS BY MOUTH ONCE DAILY.    Omega-3 Fatty Acids (fish oil) 1000 MG capsule capsule Take 1 capsule by mouth Daily.    rOPINIRole (REQUIP) 1 MG tablet Take 3 tablets by mouth Every Night.    rOPINIRole (REQUIP) 3 MG tablet Take 1 tablet by mouth Daily.    rosuvastatin (CRESTOR) 5 MG tablet Take 1 tablet by mouth Daily.     No current facility-administered medications on file prior to visit.         The following portions of the patient's history were reviewed and updated as appropriate: allergies, current medications, past family history, past medical history, past social history, past surgical history and problem list.    Review of Systems   Constitutional: Negative.   HENT: Negative.  Negative for congestion.    Eyes: Negative.    Cardiovascular: Negative.   "Negative for chest pain, cyanosis, dyspnea on exertion, irregular heartbeat, leg swelling, near-syncope, orthopnea, palpitations, paroxysmal nocturnal dyspnea and syncope.   Respiratory: Negative.  Negative for shortness of breath.    Hematologic/Lymphatic: Negative.    Musculoskeletal: Negative.    Gastrointestinal: Negative.    Neurological: Negative.  Negative for headaches.        Objective:     /84 (BP Location: Left arm, Patient Position: Sitting, Cuff Size: Adult)   Pulse 68   Ht 182.9 cm (72\")   Wt 129 kg (284 lb)   SpO2 98%   BMI 38.52 kg/m²   Pulmonary:      Effort: Pulmonary effort is normal.      Breath sounds: Normal breath sounds. No stridor. No wheezing. No rhonchi. No rales.   Cardiovascular:      PMI at left midclavicular line. Normal rate. Regular rhythm. Normal S1. Normal S2.       Murmurs: There is no murmur.      No gallop.  No click. No rub.   Pulses:     Intact distal pulses.   Edema:     Peripheral edema absent.         Lab Review                ECG 12 Lead    Date/Time: 9/16/2023 1:21 PM  Performed by: Darian Richard MD  Authorized by: Darian Richard MD   Comparison: compared with previous ECG from 9/1/2023  Similar to previous ECG  Rhythm: sinus rhythm  Rate: normal  BPM: 66  Conduction: non-specific intraventricular conduction delay  QRS axis: normal  Other findings: non-specific ST-T wave changes    Clinical impression: non-specific ECG           I personally viewed and interpreted the patient's LAB data         Assessment:     1. Paroxysmal atrial fibrillation    2. Mixed hyperlipidemia    3. Essential hypertension    4. Chronic anticoagulation with Eliquis    5. Obesity, Class III, BMI 40-49.9 (morbid obesity)          Plan:     Paroxysmal atrial fibrillation  Patient is maintaining sinus rhythm.  She will continue Tambocor 100 twice daily.    [id number] 3.  She will continue Eliquis 5 twice daily.      Hypertension, lisinopril HCTZ " continued.    Hyperlipidemia  She will continue Crestor.  Weight loss healthy lifestyle emphasized.  Follow-up scheduled  Lab work next visit          No follow-ups on file.

## 2023-09-15 NOTE — LETTER
September 16, 2023     LOLIS Kong  [address]  [place] [address]    Patient: Coni Tubbs   YOB: 1965   Date of Visit: 9/15/2023       Dear LOLIS Kong    Coni Tubbs was in my office today. Below is a copy of my note.    If you have questions, please do not hesitate to call me. I look forward to following Coni along with you.         Sincerely,        Darian Richard MD        CC: Simeon Aldrich Sr., DMD    subjective     Chief Complaint   Patient presents with   • Atrial Fibrillation     Follow up     History of Present Illness    Patient is 58 years old white female who is here for cardiology follow-up.  She has history of paroxysmal atrial fibrillation, hypertension, hyperlipidemia and obesity.  She is doing very well she denies any chest pain palpitations or shortness of breath.  She is anticoagulated with Eliquis.    Blood pressure is also very well controlled.  She is taking only half of lisinopril daily.    Hyperlipidemia on Crestor.  No drug side effects.  She is also overweight and is diabetic on metformin and Januvia.      Past Surgical History:   Procedure Laterality Date   • CARDIOVASCULAR STRESS TEST  08/02/2017   • ECHO - CONVERTED  07/05/2017   • KNEE ARTHROSCOPY Right 2004   • SKIN CANCER EXCISION Left 12/2017     Family History   Problem Relation Age of Onset   • Heart attack Mother    • Heart disease Mother    • Diabetes Mother    • Kidney failure Mother    • Obesity Mother    • Kidney disease Father    • COPD Father    • Stroke Sister    • Hypertension Sister    • COPD Brother    • Heart disease Sister    • Heart attack Sister    • Heart failure Sister      Past Medical History:   Diagnosis Date   • Emphysema, unspecified    • Hypertension    • Restless leg syndrome      Patient Active Problem List   Diagnosis   • Chest pain in adult   • Essential hypertension   • Bilateral leg edema   • Obesity, Class III, BMI 40-49.9 (morbid obesity)    • Gouty arthritis   • Obstructive sleep apnea   • MORENO (dyspnea on exertion)   • Palpitations   • Paroxysmal atrial fibrillation   • Chronic anticoagulation with Eliquis   • Mixed hyperlipidemia       Social History     Tobacco Use   • Smoking status: Never   • Smokeless tobacco: Never   Vaping Use   • Vaping Use: Never used   Substance Use Topics   • Alcohol use: No     Comment:  occ  3-4 times a year   • Drug use: No       Allergies   Allergen Reactions   • Cozaar [Losartan] Hives   • Levaquin [Levofloxacin] Hives   • Penicillins Other (See Comments)     Unknown    • Clindamycin/Lincomycin Itching and Rash   • Sulfa Antibiotics Itching and Rash   • Vancomycin Itching and Rash       Current Outpatient Medications on File Prior to Visit   Medication Sig   • albuterol (PROAIR HFA) 108 (90 Base) MCG/ACT inhaler Inhale 2 puffs Every 4 (Four) Hours As Needed for Shortness of Air.   • allopurinol (ZYLOPRIM) 100 MG tablet Take 1 tablet by mouth Daily.   • cetirizine (zyrTEC) 10 MG tablet Take 1 tablet by mouth Daily.   • cholecalciferol (VITAMIN D3) 1000 units tablet Take 1 tablet by mouth Daily.   • Eliquis 5 MG tablet tablet TAKE ONE TABLET BY MOUTH EVERY 12 HOURS   • flecainide (TAMBOCOR) 100 MG tablet Take 1 tablet by mouth 2 (Two) Times a Day.   • gabapentin (NEURONTIN) 300 MG capsule Take 1 capsule by mouth Daily.   • hydroCHLOROthiazide (MICROZIDE) 12.5 MG capsule Take 1 capsule by mouth Daily.   • Januvia 100 MG tablet Take 1 tablet by mouth Daily.   • lisinopril (PRINIVIL,ZESTRIL) 5 MG tablet Take 1 tablet by mouth Daily.   • metFORMIN (GLUCOPHAGE) 1000 MG tablet Take 1 tablet by mouth Daily.   • metoprolol succinate XL (TOPROL-XL) 25 MG 24 hr tablet TAKE TWO TABLETS BY MOUTH ONCE DAILY.   • Omega-3 Fatty Acids (fish oil) 1000 MG capsule capsule Take 1 capsule by mouth Daily.   • rOPINIRole (REQUIP) 1 MG tablet Take 3 tablets by mouth Every Night.   • rOPINIRole (REQUIP) 3 MG tablet Take 1 tablet by mouth Daily.  "  • rosuvastatin (CRESTOR) 5 MG tablet Take 1 tablet by mouth Daily.     No current facility-administered medications on file prior to visit.         The following portions of the patient's history were reviewed and updated as appropriate: allergies, current medications, past family history, past medical history, past social history, past surgical history and problem list.    ROS       Objective:     /84 (BP Location: Left arm, Patient Position: Sitting, Cuff Size: Adult)   Pulse 68   Ht 182.9 cm (72\")   Wt 129 kg (284 lb)   SpO2 98%   BMI 38.52 kg/m²   Physical Exam      Lab Review      Procedures       I personally viewed and interpreted the patient's LAB data         Assessment:     1. Paroxysmal atrial fibrillation    2. Mixed hyperlipidemia    3. Essential hypertension    4. Chronic anticoagulation with Eliquis    5. Obesity, Class III, BMI 40-49.9 (morbid obesity)          Plan:              No follow-ups on file.    "

## 2023-09-16 PROBLEM — E11.9 TYPE 2 DIABETES MELLITUS WITHOUT COMPLICATION, WITHOUT LONG-TERM CURRENT USE OF INSULIN: Status: ACTIVE | Noted: 2023-09-16

## 2023-11-27 RX ORDER — METOPROLOL SUCCINATE 25 MG/1
50 TABLET, EXTENDED RELEASE ORAL DAILY
Qty: 180 TABLET | Refills: 1 | Status: SHIPPED | OUTPATIENT
Start: 2023-11-27

## 2023-12-07 ENCOUNTER — OFFICE VISIT (OUTPATIENT)
Dept: CARDIOLOGY | Facility: CLINIC | Age: 58
End: 2023-12-07
Payer: MEDICARE

## 2023-12-07 VITALS
DIASTOLIC BLOOD PRESSURE: 80 MMHG | HEIGHT: 72 IN | SYSTOLIC BLOOD PRESSURE: 135 MMHG | WEIGHT: 293 LBS | OXYGEN SATURATION: 97 % | BODY MASS INDEX: 39.68 KG/M2 | HEART RATE: 64 BPM

## 2023-12-07 DIAGNOSIS — I48.0 PAROXYSMAL ATRIAL FIBRILLATION: ICD-10-CM

## 2023-12-07 DIAGNOSIS — E78.2 MIXED HYPERLIPIDEMIA: Primary | ICD-10-CM

## 2023-12-07 DIAGNOSIS — Z79.01 CHRONIC ANTICOAGULATION: ICD-10-CM

## 2023-12-07 DIAGNOSIS — I10 ESSENTIAL HYPERTENSION: ICD-10-CM

## 2023-12-07 DIAGNOSIS — E66.01 OBESITY, CLASS III, BMI 40-49.9 (MORBID OBESITY): ICD-10-CM

## 2023-12-07 NOTE — PROGRESS NOTES
subjective     Chief Complaint   Patient presents with    Atrial Fibrillation     Follow up       Problems Addressed This Visit  1. Mixed hyperlipidemia    2. Essential hypertension    3. Paroxysmal atrial fibrillation    4. Chronic anticoagulation with Eliquis    5. Obesity, Class III, BMI 40-49.9 (morbid obesity)        History of Present Illness  Marlee is 58 years old white female who is here for cardiology follow-up.    Hyperlipidemia  She is taking Crestor 5 mg daily and fish oil.  No drug side effects.  She is compliant with medications however she has not lost any weight.  She is significantly overweight.    Hypertension  Blood pressure is very well-controlled.  She is taking Toprol-XL 50 mg daily and lisinopril 5 mg daily along with hydrochlorothiazide 12.5 mg daily    Paroxysmal atrial fibrillation  She is maintaining sinus rhythm.  She is taking Tambocor 100 twice daily.  She is also anticoagulated with Eliquis 5 twice daily no abnormal bleeding.      Past Surgical History:   Procedure Laterality Date    CARDIOVASCULAR STRESS TEST  08/02/2017    ECHO - CONVERTED  07/05/2017    KNEE ARTHROSCOPY Right 2004    SKIN CANCER EXCISION Left 12/2017     Family History   Problem Relation Age of Onset    Heart attack Mother     Heart disease Mother     Diabetes Mother     Kidney failure Mother     Obesity Mother     Kidney disease Father     COPD Father     Stroke Sister     Hypertension Sister     COPD Brother     Heart disease Sister     Heart attack Sister     Heart failure Sister      Past Medical History:   Diagnosis Date    Emphysema, unspecified     Hypertension     Restless leg syndrome      Patient Active Problem List   Diagnosis    Chest pain in adult    Essential hypertension    Bilateral leg edema    Obesity, Class III, BMI 40-49.9 (morbid obesity)    Gouty arthritis    Obstructive sleep apnea    MORENO (dyspnea on exertion)    Palpitations    Paroxysmal atrial fibrillation    Chronic anticoagulation with  Eliquis    Mixed hyperlipidemia    Type 2 diabetes mellitus without complication, without long-term current use of insulin       Social History     Tobacco Use    Smoking status: Never    Smokeless tobacco: Never   Vaping Use    Vaping Use: Never used   Substance Use Topics    Alcohol use: No     Comment:  occ  3-4 times a year    Drug use: No       Allergies   Allergen Reactions    Cozaar [Losartan] Hives    Levaquin [Levofloxacin] Hives    Penicillins Other (See Comments)     Unknown     Clindamycin/Lincomycin Itching and Rash    Sulfa Antibiotics Itching and Rash    Vancomycin Itching and Rash       Current Outpatient Medications on File Prior to Visit   Medication Sig    albuterol (PROAIR HFA) 108 (90 Base) MCG/ACT inhaler Inhale 2 puffs Every 4 (Four) Hours As Needed for Shortness of Air.    allopurinol (ZYLOPRIM) 100 MG tablet Take 1 tablet by mouth Daily.    cetirizine (zyrTEC) 10 MG tablet Take 1 tablet by mouth Daily.    cholecalciferol (VITAMIN D3) 1000 units tablet Take 1 tablet by mouth Daily.    Eliquis 5 MG tablet tablet TAKE ONE TABLET BY MOUTH EVERY 12 HOURS    flecainide (TAMBOCOR) 100 MG tablet Take 1 tablet by mouth 2 (Two) Times a Day.    hydroCHLOROthiazide (MICROZIDE) 12.5 MG capsule Take 1 capsule by mouth Daily.    Januvia 100 MG tablet Take 1 tablet by mouth Daily.    lisinopril (PRINIVIL,ZESTRIL) 5 MG tablet Take 1 tablet by mouth Daily.    metFORMIN (GLUCOPHAGE) 1000 MG tablet Take 1 tablet by mouth Daily.    metoprolol succinate XL (TOPROL-XL) 25 MG 24 hr tablet TAKE TWO TABLETS BY MOUTH EVERY DAY    Omega-3 Fatty Acids (fish oil) 1000 MG capsule capsule Take 1 capsule by mouth Daily.    rOPINIRole (REQUIP) 3 MG tablet Take 1 tablet by mouth Daily.    rosuvastatin (CRESTOR) 5 MG tablet Take 1 tablet by mouth Daily.    gabapentin (NEURONTIN) 300 MG capsule Take 1 capsule by mouth Daily. (Patient not taking: Reported on 12/7/2023)    rOPINIRole (REQUIP) 1 MG tablet Take 3 tablets by mouth  "Every Night. (Patient not taking: Reported on 12/7/2023)     No current facility-administered medications on file prior to visit.         The following portions of the patient's history were reviewed and updated as appropriate: allergies, current medications, past family history, past medical history, past social history, past surgical history and problem list.    Review of Systems   Constitutional: Negative.   HENT: Negative.  Negative for congestion.    Eyes: Negative.    Cardiovascular: Negative.  Negative for chest pain, cyanosis, dyspnea on exertion, irregular heartbeat, leg swelling, near-syncope, orthopnea, palpitations, paroxysmal nocturnal dyspnea and syncope.   Respiratory: Negative.  Negative for shortness of breath.    Hematologic/Lymphatic: Negative.    Musculoskeletal: Negative.    Gastrointestinal: Negative.    Neurological: Negative.  Negative for headaches.          Objective:     /80 (BP Location: Left arm, Patient Position: Sitting, Cuff Size: Large Adult)   Pulse 64   Ht 182.9 cm (72\")   Wt 135 kg (297 lb)   SpO2 97%   BMI 40.28 kg/m²   Pulmonary:      Effort: Pulmonary effort is normal.      Breath sounds: Normal breath sounds. No stridor. No wheezing. No rhonchi. No rales.   Cardiovascular:      PMI at left midclavicular line. Normal rate. Regular rhythm. Normal S1. Normal S2.       Murmurs: There is no murmur.      No gallop.  No click. No rub.   Pulses:     Intact distal pulses.   Edema:     Peripheral edema absent.           Lab Review  Lab work ordered including CBC CMP lipid panel CK              Procedures    Lab work requested.  EKG next visit        Assessment:     1. Mixed hyperlipidemia    2. Essential hypertension    3. Paroxysmal atrial fibrillation    4. Chronic anticoagulation with Eliquis    5. Obesity, Class III, BMI 40-49.9 (morbid obesity)          Plan:     Hyperlipidemia  Patient is taking Crestor and fish oil.  Lab work scheduled.  Healthy lifestyle and weight " loss emphasized.  Patient is significantly overweight.    Hypertension  Blood pressure is well-controlled she will continue Toprol-XL lisinopril and hydrochlorothiazide.    Paroxysmal atrial fibrillation  She will continue Tambocor along with Eliquis.  EKG next visit.    Dietary restrictions again discussed weight loss emphasized.  Follow-up scheduled        No follow-ups on file.

## 2023-12-07 NOTE — LETTER
December 8, 2023     LOLIS Kong  [address]  [facility] [address]    Patient: Coni Tubbs   YOB: 1965   Date of Visit: 12/7/2023       Dear LOLIS Kong    Coni Tubbs was in my office today. Below is a copy of my note.    If you have questions, please do not hesitate to call me. I look forward to following Coni along with you.         Sincerely,        Darian Richard MD        CC: Simeon Aldrich Sr., DMD    subjective     Chief Complaint   Patient presents with   • Atrial Fibrillation     Follow up       Problems Addressed This Visit  1. Mixed hyperlipidemia    2. Essential hypertension    3. Paroxysmal atrial fibrillation    4. Chronic anticoagulation with Eliquis    5. Obesity, Class III, BMI 40-49.9 (morbid obesity)        History of Present Illness          Past Surgical History:   Procedure Laterality Date   • CARDIOVASCULAR STRESS TEST  08/02/2017   • ECHO - CONVERTED  07/05/2017   • KNEE ARTHROSCOPY Right 2004   • SKIN CANCER EXCISION Left 12/2017     Family History   Problem Relation Age of Onset   • Heart attack Mother    • Heart disease Mother    • Diabetes Mother    • Kidney failure Mother    • Obesity Mother    • Kidney disease Father    • COPD Father    • Stroke Sister    • Hypertension Sister    • COPD Brother    • Heart disease Sister    • Heart attack Sister    • Heart failure Sister      Past Medical History:   Diagnosis Date   • Emphysema, unspecified    • Hypertension    • Restless leg syndrome      Patient Active Problem List   Diagnosis   • Chest pain in adult   • Essential hypertension   • Bilateral leg edema   • Obesity, Class III, BMI 40-49.9 (morbid obesity)   • Gouty arthritis   • Obstructive sleep apnea   • MORENO (dyspnea on exertion)   • Palpitations   • Paroxysmal atrial fibrillation   • Chronic anticoagulation with Eliquis   • Mixed hyperlipidemia   • Type 2 diabetes mellitus without complication, without long-term current use  of insulin       Social History     Tobacco Use   • Smoking status: Never   • Smokeless tobacco: Never   Vaping Use   • Vaping Use: Never used   Substance Use Topics   • Alcohol use: No     Comment:  occ  3-4 times a year   • Drug use: No       Allergies   Allergen Reactions   • Cozaar [Losartan] Hives   • Levaquin [Levofloxacin] Hives   • Penicillins Other (See Comments)     Unknown    • Clindamycin/Lincomycin Itching and Rash   • Sulfa Antibiotics Itching and Rash   • Vancomycin Itching and Rash       Current Outpatient Medications on File Prior to Visit   Medication Sig   • albuterol (PROAIR HFA) 108 (90 Base) MCG/ACT inhaler Inhale 2 puffs Every 4 (Four) Hours As Needed for Shortness of Air.   • allopurinol (ZYLOPRIM) 100 MG tablet Take 1 tablet by mouth Daily.   • cetirizine (zyrTEC) 10 MG tablet Take 1 tablet by mouth Daily.   • cholecalciferol (VITAMIN D3) 1000 units tablet Take 1 tablet by mouth Daily.   • Eliquis 5 MG tablet tablet TAKE ONE TABLET BY MOUTH EVERY 12 HOURS   • flecainide (TAMBOCOR) 100 MG tablet Take 1 tablet by mouth 2 (Two) Times a Day.   • hydroCHLOROthiazide (MICROZIDE) 12.5 MG capsule Take 1 capsule by mouth Daily.   • Januvia 100 MG tablet Take 1 tablet by mouth Daily.   • lisinopril (PRINIVIL,ZESTRIL) 5 MG tablet Take 1 tablet by mouth Daily.   • metFORMIN (GLUCOPHAGE) 1000 MG tablet Take 1 tablet by mouth Daily.   • metoprolol succinate XL (TOPROL-XL) 25 MG 24 hr tablet TAKE TWO TABLETS BY MOUTH EVERY DAY   • Omega-3 Fatty Acids (fish oil) 1000 MG capsule capsule Take 1 capsule by mouth Daily.   • rOPINIRole (REQUIP) 3 MG tablet Take 1 tablet by mouth Daily.   • rosuvastatin (CRESTOR) 5 MG tablet Take 1 tablet by mouth Daily.   • gabapentin (NEURONTIN) 300 MG capsule Take 1 capsule by mouth Daily. (Patient not taking: Reported on 12/7/2023)   • rOPINIRole (REQUIP) 1 MG tablet Take 3 tablets by mouth Every Night. (Patient not taking: Reported on 12/7/2023)     No current  "facility-administered medications on file prior to visit.         The following portions of the patient's history were reviewed and updated as appropriate: allergies, current medications, past family history, past medical history, past social history, past surgical history and problem list.    ROS       Objective:     /80 (BP Location: Left arm, Patient Position: Sitting, Cuff Size: Large Adult)   Pulse 64   Ht 182.9 cm (72\")   Wt 135 kg (297 lb)   SpO2 97%   BMI 40.28 kg/m²   Physical Exam      Lab Review                Procedures       I personally viewed and interpreted the patient's LAB data         Assessment:     1. Mixed hyperlipidemia    2. Essential hypertension    3. Paroxysmal atrial fibrillation    4. Chronic anticoagulation with Eliquis    5. Obesity, Class III, BMI 40-49.9 (morbid obesity)          Plan:              No follow-ups on file.    "

## 2024-01-25 RX ORDER — ROSUVASTATIN CALCIUM 5 MG/1
5 TABLET, COATED ORAL DAILY
Qty: 90 TABLET | Refills: 3 | Status: SHIPPED | OUTPATIENT
Start: 2024-01-25

## 2024-02-23 RX ORDER — METOPROLOL SUCCINATE 25 MG/1
50 TABLET, EXTENDED RELEASE ORAL DAILY
Qty: 180 TABLET | Refills: 1 | Status: SHIPPED | OUTPATIENT
Start: 2024-02-23

## 2024-03-01 ENCOUNTER — TELEPHONE (OUTPATIENT)
Dept: CARDIOLOGY | Facility: CLINIC | Age: 59
End: 2024-03-01
Payer: MEDICARE

## 2024-03-01 NOTE — TELEPHONE ENCOUNTER
Caller: Coni Tubbs    Relationship: Self    Best call back number: [phone]    What is the best time to reach you: ANYTIME    What was the call regarding: PATIENT HAD LABS DONE 1.8.24 WITH PCP, WHICH ARE ALREADY IN CHART. PATIENT WOULD LIKE TO KNOW IF SHE NEEDS TO HAVE LABS DONE AGAIN BEFORE HER APPOINTMENT NEXT WEEK. IF SO PATIENT WOULD LIKE THEM TO BE FAXED TO [facility]. [facility] HAS BEEN FASTING TODAY AND WOULD LIKE TO GET THEM DONE TODAY IF NEEDED.

## 2024-03-07 ENCOUNTER — OFFICE VISIT (OUTPATIENT)
Dept: CARDIOLOGY | Facility: CLINIC | Age: 59
End: 2024-03-07
Payer: MEDICARE

## 2024-03-07 VITALS
OXYGEN SATURATION: 97 % | DIASTOLIC BLOOD PRESSURE: 82 MMHG | BODY MASS INDEX: 39.68 KG/M2 | WEIGHT: 293 LBS | HEART RATE: 67 BPM | HEIGHT: 72 IN | SYSTOLIC BLOOD PRESSURE: 125 MMHG

## 2024-03-07 DIAGNOSIS — I48.0 PAROXYSMAL ATRIAL FIBRILLATION: Primary | ICD-10-CM

## 2024-03-07 DIAGNOSIS — E78.2 MIXED HYPERLIPIDEMIA: ICD-10-CM

## 2024-03-07 DIAGNOSIS — E66.01 OBESITY, CLASS III, BMI 40-49.9 (MORBID OBESITY): ICD-10-CM

## 2024-03-07 DIAGNOSIS — Z79.01 CHRONIC ANTICOAGULATION: ICD-10-CM

## 2024-03-07 DIAGNOSIS — I10 ESSENTIAL HYPERTENSION: ICD-10-CM

## 2024-03-07 RX ORDER — FUROSEMIDE 20 MG/1
20 TABLET ORAL AS NEEDED
COMMUNITY
Start: 2024-01-08

## 2024-03-07 NOTE — LETTER
March 7, 2024     LOLIS Kong  [address]  [facility] [address]    Patient: Coni Tubbs   YOB: 1965   Date of Visit: 3/7/2024       Dear LOLIS Kong    Coni Tubbs was in my office today. Below is a copy of my note.    If you have questions, please do not hesitate to call me. I look forward to following Coni along with you.         Sincerely,        Darian Richard MD        CC: Simeon Aldrich Sr., DMD    subjective     Chief Complaint   Patient presents with   • Palpitations       Problems Addressed This Visit  1. Paroxysmal atrial fibrillation    2. Mixed hyperlipidemia    3. Essential hypertension    4. Obesity, Class III, BMI 40-49.9 (morbid obesity)    5. Chronic anticoagulation with Eliquis        History of Present Illness    Patient is 58 years old white female who is here for cardiology follow-up.  She states that she is under a lot of stress however physically she is doing fairly well.  She denies any chest pain  or shortness of breath.  She still has off-and-on palpitations but no syncope or presyncope.    Paroxysmal atrial fibrillation she is maintaining sinus rhythm on Tambocor 100 mg twice daily.  She is also anticoagulated with Eliquis 5 mg twice daily.  No abnormal bleeding or bruising.    Hypertension  Blood pressure is very well-controlled on current medications which is lisinopril 5 mg daily, hydrochlorothiazide 12.5 mg daily and Toprol-XL 50 mg daily.    Hyperlipidemia  She is taking Crestor and fish oil.  Lab work reviewed.  Patient is trying to diet but has not been able to exercise.  She has not lost any weight.  She is thinking about buying a stationary bike.        Past Surgical History:   Procedure Laterality Date   • CARDIOVASCULAR STRESS TEST  08/02/2017   • ECHO - CONVERTED  07/05/2017   • KNEE ARTHROSCOPY Right 2004   • SKIN CANCER EXCISION Left 12/2017     Family History   Problem Relation Age of Onset   • Heart attack Mother     • Heart disease Mother    • Diabetes Mother    • Kidney failure Mother    • Obesity Mother    • Kidney disease Father    • COPD Father    • Stroke Sister    • Hypertension Sister    • COPD Brother    • Heart disease Sister    • Heart attack Sister    • Heart failure Sister      Past Medical History:   Diagnosis Date   • Emphysema, unspecified    • Hypertension    • Restless leg syndrome      Patient Active Problem List   Diagnosis   • Chest pain in adult   • Essential hypertension   • Bilateral leg edema   • Obesity, Class III, BMI 40-49.9 (morbid obesity)   • Gouty arthritis   • Obstructive sleep apnea   • MORENO (dyspnea on exertion)   • Palpitations   • Paroxysmal atrial fibrillation   • Chronic anticoagulation with Eliquis   • Mixed hyperlipidemia   • Type 2 diabetes mellitus without complication, without long-term current use of insulin       Social History     Tobacco Use   • Smoking status: Never   • Smokeless tobacco: Never   Vaping Use   • Vaping status: Never Used   Substance Use Topics   • Alcohol use: No     Comment:  occ  3-4 times a year   • Drug use: No       Allergies   Allergen Reactions   • Cozaar [Losartan] Hives   • Levaquin [Levofloxacin] Hives   • Penicillins Other (See Comments)     Unknown    • Clindamycin/Lincomycin Itching and Rash   • Sulfa Antibiotics Itching and Rash   • Vancomycin Itching and Rash       Current Outpatient Medications on File Prior to Visit   Medication Sig   • albuterol (PROAIR HFA) 108 (90 Base) MCG/ACT inhaler Inhale 2 puffs Every 4 (Four) Hours As Needed for Shortness of Air.   • allopurinol (ZYLOPRIM) 100 MG tablet Take 1 tablet by mouth Daily.   • cetirizine (zyrTEC) 10 MG tablet Take 1 tablet by mouth Daily.   • cholecalciferol (VITAMIN D3) 1000 units tablet Take 1 tablet by mouth Daily.   • Eliquis 5 MG tablet tablet TAKE ONE TABLET BY MOUTH EVERY 12 HOURS   • flecainide (TAMBOCOR) 100 MG tablet Take 1 tablet by mouth 2 (Two) Times a Day.   • furosemide (LASIX) 20  "MG tablet Take 1 tablet by mouth As Needed.   • hydroCHLOROthiazide (MICROZIDE) 12.5 MG capsule Take 1 capsule by mouth Daily.   • Januvia 100 MG tablet Take 1 tablet by mouth Daily.   • lisinopril (PRINIVIL,ZESTRIL) 5 MG tablet Take 1 tablet by mouth Daily.   • metFORMIN (GLUCOPHAGE) 1000 MG tablet Take 1 tablet by mouth Daily.   • metoprolol succinate XL (TOPROL-XL) 25 MG 24 hr tablet TAKE TWO TABLETS BY MOUTH EVERY DAY   • Omega-3 Fatty Acids (fish oil) 1000 MG capsule capsule Take 1 capsule by mouth Daily.   • rOPINIRole (REQUIP) 1 MG tablet Take 3 tablets by mouth Every Night.   • rOPINIRole (REQUIP) 3 MG tablet Take 1 tablet by mouth Daily.   • rosuvastatin (CRESTOR) 5 MG tablet TAKE ONE TABLET BY MOUTH EVERY DAY   • [DISCONTINUED] gabapentin (NEURONTIN) 300 MG capsule Take 1 capsule by mouth Daily. (Patient not taking: Reported on 12/7/2023)     No current facility-administered medications on file prior to visit.         The following portions of the patient's history were reviewed and updated as appropriate: allergies, current medications, past family history, past medical history, past social history, past surgical history and problem list.    Review of Systems   Constitutional: Negative.   HENT: Negative.  Negative for congestion.    Eyes: Negative.    Cardiovascular: Negative.  Negative for chest pain, cyanosis, dyspnea on exertion, irregular heartbeat, leg swelling, near-syncope, orthopnea, palpitations, paroxysmal nocturnal dyspnea and syncope.   Respiratory: Negative.  Negative for shortness of breath.    Hematologic/Lymphatic: Negative.    Musculoskeletal: Negative.    Gastrointestinal: Negative.    Neurological: Negative.  Negative for headaches.          Objective:     /82 (BP Location: Left arm, Patient Position: Sitting, Cuff Size: Large Adult)   Pulse 67   Ht 182.9 cm (72\")   Wt 134 kg (295 lb)   SpO2 97%   BMI 40.01 kg/m²   Pulmonary:      Effort: Pulmonary effort is normal.      " Breath sounds: Normal breath sounds. No stridor. No wheezing. No rhonchi. No rales.   Cardiovascular:      PMI at left midclavicular line. Normal rate. Regular rhythm. Normal S1. Normal S2.       Murmurs: There is no murmur.      No gallop.  No click. No rub.   Pulses:     Intact distal pulses.   Edema:     Peripheral edema absent.           Lab Review                    Procedures       I personally viewed and interpreted the patient's LAB data         Assessment:     1. Paroxysmal atrial fibrillation    2. Mixed hyperlipidemia    3. Essential hypertension    4. Obesity, Class III, BMI 40-49.9 (morbid obesity)    5. Chronic anticoagulation with Eliquis          Plan:     Paroxysmal atrial fibrillation, [id number] 3.  She is maintaining sinus rhythm.  She will continue Tambocor 100 mg twice daily and Toprol-XL 50 mg daily.    She will also continue Eliquis 5 twice daily.  No abnormal bleeding or bruising noted.    Hyperlipidemia  She is taking Crestor 5 mg daily, fish oil however she has not lost any weight.  Healthy lifestyle emphasized dietary restriction discussed.  She is trying to get a stationary bike for exercise.    Obesity and diabetes mellitus  She will benefit with the use of Ozempic Wegovy/Mounjaro.  She will discuss that with his PCP and see if it is safe for her.    Hypertension  Blood pressure is very well-controlled she will continue lisinopril Toprol and hydrochlorothiazide  Lab work reviewed LDL is 71 with total cholesterol 131 triglyceride 182.  Blood sugar is 192 and A1c is also elevated 7.8.        No follow-ups on file.

## 2024-03-07 NOTE — PROGRESS NOTES
subjective     Chief Complaint   Patient presents with    Palpitations       Problems Addressed This Visit  1. Paroxysmal atrial fibrillation    2. Mixed hyperlipidemia    3. Essential hypertension    4. Obesity, Class III, BMI 40-49.9 (morbid obesity)    5. Chronic anticoagulation with Eliquis        History of Present Illness    Patient is 58 years old white female who is here for cardiology follow-up.  She states that she is under a lot of stress however physically she is doing fairly well.  She denies any chest pain  or shortness of breath.  She still has off-and-on palpitations but no syncope or presyncope.    Paroxysmal atrial fibrillation she is maintaining sinus rhythm on Tambocor 100 mg twice daily.  She is also anticoagulated with Eliquis 5 mg twice daily.  No abnormal bleeding or bruising.    Hypertension  Blood pressure is very well-controlled on current medications which is lisinopril 5 mg daily, hydrochlorothiazide 12.5 mg daily and Toprol-XL 50 mg daily.    Hyperlipidemia  She is taking Crestor and fish oil.  Lab work reviewed.  Patient is trying to diet but has not been able to exercise.  She has not lost any weight.  She is thinking about buying a stationary bike.        Past Surgical History:   Procedure Laterality Date    CARDIOVASCULAR STRESS TEST  08/02/2017    ECHO - CONVERTED  07/05/2017    KNEE ARTHROSCOPY Right 2004    SKIN CANCER EXCISION Left 12/2017     Family History   Problem Relation Age of Onset    Heart attack Mother     Heart disease Mother     Diabetes Mother     Kidney failure Mother     Obesity Mother     Kidney disease Father     COPD Father     Stroke Sister     Hypertension Sister     COPD Brother     Heart disease Sister     Heart attack Sister     Heart failure Sister      Past Medical History:   Diagnosis Date    Emphysema, unspecified     Hypertension     Restless leg syndrome      Patient Active Problem List   Diagnosis    Chest pain in adult    Essential hypertension     Bilateral leg edema    Obesity, Class III, BMI 40-49.9 (morbid obesity)    Gouty arthritis    Obstructive sleep apnea    MORENO (dyspnea on exertion)    Palpitations    Paroxysmal atrial fibrillation    Chronic anticoagulation with Eliquis    Mixed hyperlipidemia    Type 2 diabetes mellitus without complication, without long-term current use of insulin       Social History     Tobacco Use    Smoking status: Never    Smokeless tobacco: Never   Vaping Use    Vaping status: Never Used   Substance Use Topics    Alcohol use: No     Comment:  occ  3-4 times a year    Drug use: No       Allergies   Allergen Reactions    Cozaar [Losartan] Hives    Levaquin [Levofloxacin] Hives    Penicillins Other (See Comments)     Unknown     Clindamycin/Lincomycin Itching and Rash    Sulfa Antibiotics Itching and Rash    Vancomycin Itching and Rash       Current Outpatient Medications on File Prior to Visit   Medication Sig    albuterol (PROAIR HFA) 108 (90 Base) MCG/ACT inhaler Inhale 2 puffs Every 4 (Four) Hours As Needed for Shortness of Air.    allopurinol (ZYLOPRIM) 100 MG tablet Take 1 tablet by mouth Daily.    cetirizine (zyrTEC) 10 MG tablet Take 1 tablet by mouth Daily.    cholecalciferol (VITAMIN D3) 1000 units tablet Take 1 tablet by mouth Daily.    Eliquis 5 MG tablet tablet TAKE ONE TABLET BY MOUTH EVERY 12 HOURS    flecainide (TAMBOCOR) 100 MG tablet Take 1 tablet by mouth 2 (Two) Times a Day.    furosemide (LASIX) 20 MG tablet Take 1 tablet by mouth As Needed.    hydroCHLOROthiazide (MICROZIDE) 12.5 MG capsule Take 1 capsule by mouth Daily.    Januvia 100 MG tablet Take 1 tablet by mouth Daily.    lisinopril (PRINIVIL,ZESTRIL) 5 MG tablet Take 1 tablet by mouth Daily.    metFORMIN (GLUCOPHAGE) 1000 MG tablet Take 1 tablet by mouth Daily.    metoprolol succinate XL (TOPROL-XL) 25 MG 24 hr tablet TAKE TWO TABLETS BY MOUTH EVERY DAY    Omega-3 Fatty Acids (fish oil) 1000 MG capsule capsule Take 1 capsule by mouth Daily.     "rOPINIRole (REQUIP) 1 MG tablet Take 3 tablets by mouth Every Night.    rOPINIRole (REQUIP) 3 MG tablet Take 1 tablet by mouth Daily.    rosuvastatin (CRESTOR) 5 MG tablet TAKE ONE TABLET BY MOUTH EVERY DAY    [DISCONTINUED] gabapentin (NEURONTIN) 300 MG capsule Take 1 capsule by mouth Daily. (Patient not taking: Reported on 12/7/2023)     No current facility-administered medications on file prior to visit.         The following portions of the patient's history were reviewed and updated as appropriate: allergies, current medications, past family history, past medical history, past social history, past surgical history and problem list.    Review of Systems   Constitutional: Negative.   HENT: Negative.  Negative for congestion.    Eyes: Negative.    Cardiovascular: Negative.  Negative for chest pain, cyanosis, dyspnea on exertion, irregular heartbeat, leg swelling, near-syncope, orthopnea, palpitations, paroxysmal nocturnal dyspnea and syncope.   Respiratory: Negative.  Negative for shortness of breath.    Hematologic/Lymphatic: Negative.    Musculoskeletal: Negative.    Gastrointestinal: Negative.    Neurological: Negative.  Negative for headaches.          Objective:     /82 (BP Location: Left arm, Patient Position: Sitting, Cuff Size: Large Adult)   Pulse 67   Ht 182.9 cm (72\")   Wt 134 kg (295 lb)   SpO2 97%   BMI 40.01 kg/m²   Pulmonary:      Effort: Pulmonary effort is normal.      Breath sounds: Normal breath sounds. No stridor. No wheezing. No rhonchi. No rales.   Cardiovascular:      PMI at left midclavicular line. Normal rate. Regular rhythm. Normal S1. Normal S2.       Murmurs: There is no murmur.      No gallop.  No click. No rub.   Pulses:     Intact distal pulses.   Edema:     Peripheral edema absent.           Lab Review                      ECG 12 Lead    Date/Time: 3/7/2024 12:32 PM  Performed by: Darian Richard MD    Authorized by: Darian Richard MD  Comparison: " compared with previous ECG from 9/15/2023  Similar to previous ECG  Rhythm: sinus rhythm  Rate: normal  BPM: 66  Conduction: conduction normal  ST Segments: ST segments normal  T Waves: T waves normal  QRS axis: normal  Other: no other findings    Clinical impression: normal ECG  Comments: QTc 403             I personally viewed and interpreted the patient's LAB data         Assessment:     1. Paroxysmal atrial fibrillation    2. Mixed hyperlipidemia    3. Essential hypertension    4. Obesity, Class III, BMI 40-49.9 (morbid obesity)    5. Chronic anticoagulation with Eliquis          Plan:     Paroxysmal atrial fibrillation, [id number] 3.  She is maintaining sinus rhythm.  She will continue Tambocor 100 mg twice daily and Toprol-XL 50 mg daily.    She will also continue Eliquis 5 twice daily.  No abnormal bleeding or bruising noted.    Hyperlipidemia  She is taking Crestor 5 mg daily, fish oil however she has not lost any weight.  Healthy lifestyle emphasized dietary restriction discussed.  She is trying to get a stationary bike for exercise.    Obesity and diabetes mellitus  She will benefit with the use of Ozempic Wegovy/Mounjaro.  She will discuss that with his PCP and see if it is safe for her.    Hypertension  Blood pressure is very well-controlled she will continue lisinopril Toprol and hydrochlorothiazide  Lab work reviewed LDL is 71 with total cholesterol 131 triglyceride 182.  Blood sugar is 192 and A1c is also elevated 7.8.        No follow-ups on file.

## 2024-07-18 ENCOUNTER — OFFICE VISIT (OUTPATIENT)
Dept: CARDIOLOGY | Facility: CLINIC | Age: 59
End: 2024-07-18
Payer: MEDICARE

## 2024-07-18 ENCOUNTER — TELEPHONE (OUTPATIENT)
Dept: CARDIOLOGY | Facility: CLINIC | Age: 59
End: 2024-07-18

## 2024-07-18 VITALS
OXYGEN SATURATION: 99 % | SYSTOLIC BLOOD PRESSURE: 140 MMHG | HEIGHT: 72 IN | HEART RATE: 68 BPM | WEIGHT: 287 LBS | BODY MASS INDEX: 38.87 KG/M2 | DIASTOLIC BLOOD PRESSURE: 80 MMHG

## 2024-07-18 DIAGNOSIS — E78.2 MIXED HYPERLIPIDEMIA: ICD-10-CM

## 2024-07-18 DIAGNOSIS — E66.9 OBESITY (BMI 35.0-39.9 WITHOUT COMORBIDITY): ICD-10-CM

## 2024-07-18 DIAGNOSIS — I10 ESSENTIAL HYPERTENSION: Primary | ICD-10-CM

## 2024-07-18 DIAGNOSIS — I48.0 PAROXYSMAL ATRIAL FIBRILLATION: ICD-10-CM

## 2024-07-18 PROBLEM — E66.01 OBESITY, CLASS III, BMI 40-49.9 (MORBID OBESITY): Status: RESOLVED | Noted: 2017-07-20 | Resolved: 2024-07-18

## 2024-07-18 PROCEDURE — 99214 OFFICE O/P EST MOD 30 MIN: CPT | Performed by: INTERNAL MEDICINE

## 2024-07-18 PROCEDURE — 3077F SYST BP >= 140 MM HG: CPT | Performed by: INTERNAL MEDICINE

## 2024-07-18 PROCEDURE — 3079F DIAST BP 80-89 MM HG: CPT | Performed by: INTERNAL MEDICINE

## 2024-07-18 NOTE — TELEPHONE ENCOUNTER
----- Message from Darian Richard sent at 7/18/2024 11:58 AM EDT -----  We received a copy of lab work from your PCP it was done in April so we need lab work 3 months from now

## 2024-07-18 NOTE — PROGRESS NOTES
subjective     Chief Complaint   Patient presents with    Atrial Fibrillation     Follow up       Problems Addressed This Visit  1. Essential hypertension    2. Paroxysmal atrial fibrillation, [id number] 3    3. Mixed hyperlipidemia    4. Obesity (BMI 35.0-39.9 without comorbidity)        History of Present Illness    Patient is 59 years old white female who is here for cardiology follow-up.  Patient has hypertension and has been taking lisinopril however she says that she is taking lisinopril every other day because blood pressure sometimes drops with lisinopril.  She was advised to check blood pressure twice a day and if she is to decrease lisinopril take half dose daily instead of taking full dose every other day    Paroxysmal atrial fibrillation with [id number] 3.  He is anticoagulated with Eliquis and is maintaining sinus rhythm on Tambocor.    Hyperlipidemia on Crestor 5 mg daily.    Patient is still morbidly overweight.  Healthy lifestyle and weight loss emphasized.      Past Surgical History:   Procedure Laterality Date    CARDIOVASCULAR STRESS TEST  08/02/2017    ECHO - CONVERTED  07/05/2017    KNEE ARTHROSCOPY Right 2004    SKIN CANCER EXCISION Left 12/2017     Family History   Problem Relation Age of Onset    Heart attack Mother     Heart disease Mother     Diabetes Mother     Kidney failure Mother     Obesity Mother     Kidney disease Father     COPD Father     Stroke Sister     Hypertension Sister     COPD Brother     Heart disease Sister     Heart attack Sister     Heart failure Sister      Past Medical History:   Diagnosis Date    Emphysema, unspecified     Hypertension     Restless leg syndrome      Patient Active Problem List   Diagnosis    Chest pain in adult    Essential hypertension    Bilateral leg edema    Gouty arthritis    Obstructive sleep apnea    MORENO (dyspnea on exertion)    Palpitations    Paroxysmal atrial fibrillation, [id number] 3    Chronic anticoagulation with Eliquis    Mixed  hyperlipidemia    Type 2 diabetes mellitus without complication, without long-term current use of insulin    Obesity (BMI 35.0-39.9 without comorbidity)       Social History     Tobacco Use    Smoking status: Never    Smokeless tobacco: Never   Vaping Use    Vaping status: Never Used   Substance Use Topics    Alcohol use: No     Comment:  occ  3-4 times a year    Drug use: No       Allergies   Allergen Reactions    Cozaar [Losartan] Hives    Levaquin [Levofloxacin] Hives    Penicillins Other (See Comments)     Unknown     Clindamycin/Lincomycin Itching and Rash    Sulfa Antibiotics Itching and Rash    Vancomycin Itching and Rash       Current Outpatient Medications on File Prior to Visit   Medication Sig    albuterol (PROAIR HFA) 108 (90 Base) MCG/ACT inhaler Inhale 2 puffs Every 4 (Four) Hours As Needed for Shortness of Air.    allopurinol (ZYLOPRIM) 100 MG tablet Take 1 tablet by mouth Daily.    cetirizine (zyrTEC) 10 MG tablet Take 1 tablet by mouth Daily.    cholecalciferol (VITAMIN D3) 1000 units tablet Take 1 tablet by mouth Daily.    Eliquis 5 MG tablet tablet TAKE ONE TABLET BY MOUTH EVERY 12 HOURS    flecainide (TAMBOCOR) 100 MG tablet Take 1 tablet by mouth 2 (Two) Times a Day.    furosemide (LASIX) 20 MG tablet Take 1 tablet by mouth As Needed.    hydroCHLOROthiazide (MICROZIDE) 12.5 MG capsule Take 1 capsule by mouth Daily.    Januvia 100 MG tablet Take 1 tablet by mouth Daily.    lisinopril (PRINIVIL,ZESTRIL) 5 MG tablet Take 1 tablet by mouth Daily.    metFORMIN (GLUCOPHAGE) 1000 MG tablet Take 1 tablet by mouth Daily.    metoprolol succinate XL (TOPROL-XL) 25 MG 24 hr tablet TAKE TWO TABLETS BY MOUTH EVERY DAY    Omega-3 Fatty Acids (fish oil) 1000 MG capsule capsule Take 1 capsule by mouth Daily.    rOPINIRole (REQUIP) 3 MG tablet Take 1 tablet by mouth Daily.    rosuvastatin (CRESTOR) 5 MG tablet TAKE ONE TABLET BY MOUTH EVERY DAY     No current facility-administered medications on file prior to  visit.     (Not in a hospital admission)      Results for orders placed during the hospital encounter of 09/20/22    Adult Transthoracic Echo Complete W/ Cont if Necessary Per Protocol    Interpretation Summary  · Technically difficult echo and Doppler study was obtained.  · Normal left ventricular cavity size noted.  · Left ventricular wall thickness is consistent with mild concentric hypertrophy.  · Left ventricular ejection fraction appears to be 61 - 65%. Left ventricular systolic function is normal.  · Left ventricular diastolic function is consistent with (grade II w/high LAP) pseudonormalization.  · Aortic valve is normal, no aortic stenosis or regurgitation noted.  · Mitral valve appears normal, no mitral stenosis or regurgitation noted.  · Mild tricuspid regurgitation with mild pulmonary hypertension noted.  · No pericardial effusion detected.    Results for orders placed during the hospital encounter of 08/01/17    Stress Test With Myocardial Perfusion One Day    Interpretation Summary  · Breast attenuation and GI artifacts are present.  · Left ventricular ejection fraction is hyperdynamic (Calculated EF > 70%).  · Myocardial perfusion imaging indicates a normal myocardial perfusion study with no evidence of ischemia.  · Impressions are consistent with a low risk study.  · Findings consistent with a normal ECG stress test.  · Fixed ant wall defect with good wall motion, probably representing breast attenuation.          The following portions of the patient's history were reviewed and updated as appropriate: allergies, current medications, past family history, past medical history, past social history, past surgical history and problem list.    Review of Systems   Constitutional: Negative.   HENT: Negative.  Negative for congestion.    Eyes: Negative.    Cardiovascular: Negative.  Negative for chest pain, cyanosis, dyspnea on exertion, irregular heartbeat, leg swelling, near-syncope, orthopnea,  "palpitations, paroxysmal nocturnal dyspnea and syncope.   Respiratory: Negative.  Negative for shortness of breath.    Hematologic/Lymphatic: Negative.    Musculoskeletal: Negative.    Gastrointestinal: Negative.    Neurological: Negative.  Negative for headaches.          Objective:     /94 (BP Location: Left arm, Patient Position: Sitting, Cuff Size: Adult)   Pulse 68   Ht 182.9 cm (72\")   Wt 130 kg (287 lb)   SpO2 99%   BMI 38.92 kg/m²   Pulmonary:      Effort: Pulmonary effort is normal.      Breath sounds: Normal breath sounds. No stridor. No wheezing. No rhonchi. No rales.   Cardiovascular:      PMI at left midclavicular line. Normal rate. Regular rhythm. Normal S1. Normal S2.       Murmurs: There is no murmur.      No gallop.  No click. No rub.   Pulses:     Intact distal pulses.   Edema:     Peripheral edema absent.           Lab Review              Procedures       I personally viewed and interpreted the patient's LAB data         Assessment:     1. Essential hypertension    2. Paroxysmal atrial fibrillation, [id number] 3    3. Mixed hyperlipidemia    4. Obesity (BMI 35.0-39.9 without comorbidity)          Plan:       Hypertension  Blood pressure is elevated today patient states that she did not take any lisinopril today because she takes it every other day.  She was advised take lisinopril daily and check blood pressure twice a day.  If she needs to decrease the dose she will take half tablet every day instead of 1 every other day.  Blood pressure goal of 120 to 130 systolic was explained.    Paroxysmal atrial fibrillation  She will continue Tambocor 100 twice daily.  She is maintaining sinus rhythm.  Eliquis 5 twice daily was continued.  [id number] 3.    Weight loss was emphasized    Hyperlipidemia last LDL is 103 further weight loss is add restriction discussed.  Follow-up scheduled lab work in 3 months        No follow-ups on file.  "

## 2024-07-18 NOTE — LETTER
July 24, 2024     LOLIS Kong  [address]  [facility] [address]    Patient: Coni Tubbs   YOB: 1965   Date of Visit: 7/18/2024       Dear LOLIS Kong    Coni Tubbs was in my office today. Below is a copy of my note.    If you have questions, please do not hesitate to call me. I look forward to following Coni along with you.         Sincerely,        Darian Richard MD        CC: Simeon Aldrich Sr., DMD    subjective     Chief Complaint   Patient presents with   • Atrial Fibrillation     Follow up       Problems Addressed This Visit  1. Essential hypertension    2. Paroxysmal atrial fibrillation, [id number] 3    3. Mixed hyperlipidemia    4. Obesity (BMI 35.0-39.9 without comorbidity)        History of Present Illness          Past Surgical History:   Procedure Laterality Date   • CARDIOVASCULAR STRESS TEST  08/02/2017   • ECHO - CONVERTED  07/05/2017   • KNEE ARTHROSCOPY Right 2004   • SKIN CANCER EXCISION Left 12/2017     Family History   Problem Relation Age of Onset   • Heart attack Mother    • Heart disease Mother    • Diabetes Mother    • Kidney failure Mother    • Obesity Mother    • Kidney disease Father    • COPD Father    • Stroke Sister    • Hypertension Sister    • COPD Brother    • Heart disease Sister    • Heart attack Sister    • Heart failure Sister      Past Medical History:   Diagnosis Date   • Emphysema, unspecified    • Hypertension    • Restless leg syndrome      Patient Active Problem List   Diagnosis   • Chest pain in adult   • Essential hypertension   • Bilateral leg edema   • Gouty arthritis   • Obstructive sleep apnea   • MORENO (dyspnea on exertion)   • Palpitations   • Paroxysmal atrial fibrillation, [id number] 3   • Chronic anticoagulation with Eliquis   • Mixed hyperlipidemia   • Type 2 diabetes mellitus without complication, without long-term current use of insulin   • Obesity (BMI 35.0-39.9 without comorbidity)       Social  History     Tobacco Use   • Smoking status: Never   • Smokeless tobacco: Never   Vaping Use   • Vaping status: Never Used   Substance Use Topics   • Alcohol use: No     Comment:  occ  3-4 times a year   • Drug use: No       Allergies   Allergen Reactions   • Cozaar [Losartan] Hives   • Levaquin [Levofloxacin] Hives   • Penicillins Other (See Comments)     Unknown    • Clindamycin/Lincomycin Itching and Rash   • Sulfa Antibiotics Itching and Rash   • Vancomycin Itching and Rash       Current Outpatient Medications on File Prior to Visit   Medication Sig   • albuterol (PROAIR HFA) 108 (90 Base) MCG/ACT inhaler Inhale 2 puffs Every 4 (Four) Hours As Needed for Shortness of Air.   • allopurinol (ZYLOPRIM) 100 MG tablet Take 1 tablet by mouth Daily.   • cetirizine (zyrTEC) 10 MG tablet Take 1 tablet by mouth Daily.   • cholecalciferol (VITAMIN D3) 1000 units tablet Take 1 tablet by mouth Daily.   • Eliquis 5 MG tablet tablet TAKE ONE TABLET BY MOUTH EVERY 12 HOURS   • flecainide (TAMBOCOR) 100 MG tablet Take 1 tablet by mouth 2 (Two) Times a Day.   • furosemide (LASIX) 20 MG tablet Take 1 tablet by mouth As Needed.   • hydroCHLOROthiazide (MICROZIDE) 12.5 MG capsule Take 1 capsule by mouth Daily.   • Januvia 100 MG tablet Take 1 tablet by mouth Daily.   • lisinopril (PRINIVIL,ZESTRIL) 5 MG tablet Take 1 tablet by mouth Daily.   • metFORMIN (GLUCOPHAGE) 1000 MG tablet Take 1 tablet by mouth Daily.   • metoprolol succinate XL (TOPROL-XL) 25 MG 24 hr tablet TAKE TWO TABLETS BY MOUTH EVERY DAY   • Omega-3 Fatty Acids (fish oil) 1000 MG capsule capsule Take 1 capsule by mouth Daily.   • rOPINIRole (REQUIP) 3 MG tablet Take 1 tablet by mouth Daily.   • rosuvastatin (CRESTOR) 5 MG tablet TAKE ONE TABLET BY MOUTH EVERY DAY     No current facility-administered medications on file prior to visit.     (Not in a hospital admission)      Results for orders placed during the hospital encounter of 09/20/22    Adult Transthoracic Echo  "Complete W/ Cont if Necessary Per Protocol    Interpretation Summary  · Technically difficult echo and Doppler study was obtained.  · Normal left ventricular cavity size noted.  · Left ventricular wall thickness is consistent with mild concentric hypertrophy.  · Left ventricular ejection fraction appears to be 61 - 65%. Left ventricular systolic function is normal.  · Left ventricular diastolic function is consistent with (grade II w/high LAP) pseudonormalization.  · Aortic valve is normal, no aortic stenosis or regurgitation noted.  · Mitral valve appears normal, no mitral stenosis or regurgitation noted.  · Mild tricuspid regurgitation with mild pulmonary hypertension noted.  · No pericardial effusion detected.    Results for orders placed during the hospital encounter of 08/01/17    Stress Test With Myocardial Perfusion One Day    Interpretation Summary  · Breast attenuation and GI artifacts are present.  · Left ventricular ejection fraction is hyperdynamic (Calculated EF > 70%).  · Myocardial perfusion imaging indicates a normal myocardial perfusion study with no evidence of ischemia.  · Impressions are consistent with a low risk study.  · Findings consistent with a normal ECG stress test.  · Fixed ant wall defect with good wall motion, probably representing breast attenuation.          The following portions of the patient's history were reviewed and updated as appropriate: allergies, current medications, past family history, past medical history, past social history, past surgical history and problem list.    ROS       Objective:     /94 (BP Location: Left arm, Patient Position: Sitting, Cuff Size: Adult)   Pulse 68   Ht 182.9 cm (72\")   Wt 130 kg (287 lb)   SpO2 99%   BMI 38.92 kg/m²   Physical Exam      Lab Review              Procedures       I personally viewed and interpreted the patient's LAB data         Assessment:     1. Essential hypertension    2. Paroxysmal atrial fibrillation, " [id number] 3    3. Mixed hyperlipidemia    4. Obesity (BMI 35.0-39.9 without comorbidity)          Plan:              No follow-ups on file.

## 2024-11-07 ENCOUNTER — TELEPHONE (OUTPATIENT)
Dept: CARDIOLOGY | Facility: CLINIC | Age: 59
End: 2024-11-07

## 2024-11-07 ENCOUNTER — OFFICE VISIT (OUTPATIENT)
Dept: CARDIOLOGY | Facility: CLINIC | Age: 59
End: 2024-11-07
Payer: MEDICARE

## 2024-11-07 VITALS
HEIGHT: 72 IN | DIASTOLIC BLOOD PRESSURE: 86 MMHG | OXYGEN SATURATION: 97 % | SYSTOLIC BLOOD PRESSURE: 122 MMHG | HEART RATE: 70 BPM | BODY MASS INDEX: 38.47 KG/M2 | WEIGHT: 284 LBS

## 2024-11-07 DIAGNOSIS — I48.0 PAROXYSMAL ATRIAL FIBRILLATION: ICD-10-CM

## 2024-11-07 DIAGNOSIS — I10 ESSENTIAL HYPERTENSION: ICD-10-CM

## 2024-11-07 DIAGNOSIS — I48.0 PAROXYSMAL ATRIAL FIBRILLATION: Primary | ICD-10-CM

## 2024-11-07 DIAGNOSIS — E78.2 MIXED HYPERLIPIDEMIA: ICD-10-CM

## 2024-11-07 DIAGNOSIS — Z79.01 CHRONIC ANTICOAGULATION: ICD-10-CM

## 2024-11-07 RX ORDER — ROSUVASTATIN CALCIUM 10 MG/1
10 TABLET, COATED ORAL DAILY
Qty: 90 TABLET | Refills: 1 | Status: SHIPPED | OUTPATIENT
Start: 2024-11-07

## 2024-11-07 RX ORDER — SEMAGLUTIDE 0.68 MG/ML
0.25 INJECTION, SOLUTION SUBCUTANEOUS WEEKLY
COMMUNITY
Start: 2024-10-10

## 2024-11-07 NOTE — TELEPHONE ENCOUNTER
Darian Richard MD Collins, Sheila D, MA  Your cholesterol is high.  LDL cholesterol is around 128.  Increase Crestor 10 mg daily instead of 5        Called and given message per Dr Richard.  She v/u

## 2024-11-07 NOTE — LETTER
November 8, 2024     LOLIS Kong  [address]  [facility] [address]    Patient: Coni Tubbs   YOB: 1965   Date of Visit: 11/7/2024       Dear LOLIS Kong    Coni Tubbs was in my office today. Below is a copy of my note.    If you have questions, please do not hesitate to call me. I look forward to following Coni along with you.         Sincerely,        Darian Richard MD        CC: Simeon Aldrich Sr., DMD    subjective     Chief Complaint   Patient presents with   • Atrial Fibrillation     Follow up       Problems Addressed This Visit  No diagnosis found.    History of Present Illness          Past Surgical History:   Procedure Laterality Date   • CARDIOVASCULAR STRESS TEST  08/02/2017   • ECHO - CONVERTED  07/05/2017   • KNEE ARTHROSCOPY Right 2004   • SKIN CANCER EXCISION Left 12/2017     Family History   Problem Relation Age of Onset   • Heart attack Mother    • Heart disease Mother    • Diabetes Mother    • Kidney failure Mother    • Obesity Mother    • Kidney disease Father    • COPD Father    • Stroke Sister    • Hypertension Sister    • COPD Brother    • Heart disease Sister    • Heart attack Sister    • Heart failure Sister      Past Medical History:   Diagnosis Date   • Emphysema, unspecified    • Hypertension    • Restless leg syndrome      Patient Active Problem List   Diagnosis   • Chest pain in adult   • Essential hypertension   • Bilateral leg edema   • Gouty arthritis   • Obstructive sleep apnea   • MORENO (dyspnea on exertion)   • Palpitations   • Paroxysmal atrial fibrillation, [id number] 3   • Chronic anticoagulation with Eliquis   • Mixed hyperlipidemia   • Type 2 diabetes mellitus without complication, without long-term current use of insulin   • Obesity (BMI 35.0-39.9 without comorbidity)       Social History     Tobacco Use   • Smoking status: Never   • Smokeless tobacco: Never   Vaping Use   • Vaping status: Never Used   Substance Use  Topics   • Alcohol use: No     Comment:  occ  3-4 times a year   • Drug use: No       Allergies   Allergen Reactions   • Cozaar [Losartan] Hives   • Levaquin [Levofloxacin] Hives   • Penicillins Other (See Comments)     Unknown    • Clindamycin/Lincomycin Itching and Rash   • Sulfa Antibiotics Itching and Rash   • Vancomycin Itching and Rash       Current Outpatient Medications on File Prior to Visit   Medication Sig   • albuterol (PROAIR HFA) 108 (90 Base) MCG/ACT inhaler Inhale 2 puffs Every 4 (Four) Hours As Needed for Shortness of Air.   • allopurinol (ZYLOPRIM) 100 MG tablet Take 1 tablet by mouth Daily.   • cetirizine (zyrTEC) 10 MG tablet Take 1 tablet by mouth Daily.   • cholecalciferol (VITAMIN D3) 1000 units tablet Take 1 tablet by mouth Daily.   • Eliquis 5 MG tablet tablet TAKE ONE TABLET BY MOUTH EVERY 12 HOURS   • flecainide (TAMBOCOR) 100 MG tablet Take 1 tablet by mouth 2 (Two) Times a Day.   • furosemide (LASIX) 20 MG tablet Take 1 tablet by mouth As Needed.   • hydroCHLOROthiazide (MICROZIDE) 12.5 MG capsule Take 1 capsule by mouth Daily.   • lisinopril (PRINIVIL,ZESTRIL) 5 MG tablet Take 1 tablet by mouth Daily.   • metoprolol succinate XL (TOPROL-XL) 25 MG 24 hr tablet TAKE TWO TABLETS BY MOUTH EVERY DAY   • Omega-3 Fatty Acids (fish oil) 1000 MG capsule capsule Take 1 capsule by mouth Daily.   • Ozempic, 0.25 or 0.5 MG/DOSE, 2 MG/3ML solution pen-injector Inject 0.25 mg under the skin into the appropriate area as directed 1 (One) Time Per Week.   • rOPINIRole (REQUIP) 3 MG tablet Take 1 tablet by mouth Daily.   • rosuvastatin (CRESTOR) 5 MG tablet TAKE ONE TABLET BY MOUTH EVERY DAY   • metFORMIN (GLUCOPHAGE) 1000 MG tablet Take 1 tablet by mouth Daily. (Patient not taking: Reported on 11/7/2024)   • [DISCONTINUED] Januvia 100 MG tablet Take 1 tablet by mouth Daily. (Patient not taking: Reported on 11/7/2024)     No current facility-administered medications on file prior to visit.     (Not in a  "hospital admission)      Results for orders placed during the hospital encounter of 09/20/22    Adult Transthoracic Echo Complete W/ Cont if Necessary Per Protocol    Interpretation Summary  · Technically difficult echo and Doppler study was obtained.  · Normal left ventricular cavity size noted.  · Left ventricular wall thickness is consistent with mild concentric hypertrophy.  · Left ventricular ejection fraction appears to be 61 - 65%. Left ventricular systolic function is normal.  · Left ventricular diastolic function is consistent with (grade II w/high LAP) pseudonormalization.  · Aortic valve is normal, no aortic stenosis or regurgitation noted.  · Mitral valve appears normal, no mitral stenosis or regurgitation noted.  · Mild tricuspid regurgitation with mild pulmonary hypertension noted.  · No pericardial effusion detected.    Results for orders placed during the hospital encounter of 08/01/17    Stress Test With Myocardial Perfusion One Day    Interpretation Summary  · Breast attenuation and GI artifacts are present.  · Left ventricular ejection fraction is hyperdynamic (Calculated EF > 70%).  · Myocardial perfusion imaging indicates a normal myocardial perfusion study with no evidence of ischemia.  · Impressions are consistent with a low risk study.  · Findings consistent with a normal ECG stress test.  · Fixed ant wall defect with good wall motion, probably representing breast attenuation.          The following portions of the patient's history were reviewed and updated as appropriate: allergies, current medications, past family history, past medical history, past social history, past surgical history and problem list.    ROS       Objective:     /86 (BP Location: Left arm, Patient Position: Sitting, Cuff Size: Large Adult)   Pulse 70   Ht 182.9 cm (72\")   Wt 129 kg (284 lb)   SpO2 97%   BMI 38.52 kg/m²   Physical Exam      Lab Review                Procedures       I personally viewed and " interpreted the patient's LAB data         Assessment:   No diagnosis found.      Plan:              No follow-ups on file.

## 2024-11-07 NOTE — PROGRESS NOTES
subjective     Chief Complaint   Patient presents with    Atrial Fibrillation     Follow up       Problems Addressed This Visit  1. Paroxysmal atrial fibrillation, [id number] 3    2. Mixed hyperlipidemia    3. Chronic anticoagulation with Eliquis    4. Essential hypertension    5. Paroxysmal atrial fibrillation        History of Present Illness    Atrial fibrillation  Patient has history of paroxysmal atrial fibrillation with [id number] 3  She has been anticoagulated with Eliquis and had been taking Tambocor however lately she has been having atrial fibrillation on EKG.  She is totally asymptomatic and cannot tell if she is in atrial fibrillation or sinus rhythm.    Hypertension  Blood pressure is very well-controlled she is taking lisinopril.      Hyperlipidemia  She is taking Crestor  daily.  No drug side effects.    Obesity  She is significantly overweight and has been trying to lose weight.  She is currently taking Ozempic        Past Surgical History:   Procedure Laterality Date    CARDIOVASCULAR STRESS TEST  08/02/2017    ECHO - CONVERTED  07/05/2017    KNEE ARTHROSCOPY Right 2004    SKIN CANCER EXCISION Left 12/2017     Family History   Problem Relation Age of Onset    Heart attack Mother     Heart disease Mother     Diabetes Mother     Kidney failure Mother     Obesity Mother     Kidney disease Father     COPD Father     Stroke Sister     Hypertension Sister     COPD Brother     Heart disease Sister     Heart attack Sister     Heart failure Sister      Past Medical History:   Diagnosis Date    Emphysema, unspecified     Hypertension     Restless leg syndrome      Patient Active Problem List   Diagnosis    Chest pain in adult    Essential hypertension    Bilateral leg edema    Gouty arthritis    Obstructive sleep apnea    MORENO (dyspnea on exertion)    Palpitations    Paroxysmal atrial fibrillation, [id number] 3    Chronic anticoagulation with Eliquis    Mixed hyperlipidemia    Type 2 diabetes mellitus  without complication, without long-term current use of insulin    Obesity (BMI 35.0-39.9 without comorbidity)       Social History     Tobacco Use    Smoking status: Never    Smokeless tobacco: Never   Vaping Use    Vaping status: Never Used   Substance Use Topics    Alcohol use: No     Comment:  occ  3-4 times a year    Drug use: No       Allergies   Allergen Reactions    Cozaar [Losartan] Hives    Levaquin [Levofloxacin] Hives    Penicillins Other (See Comments)     Unknown     Clindamycin/Lincomycin Itching and Rash    Sulfa Antibiotics Itching and Rash    Vancomycin Itching and Rash       Current Outpatient Medications on File Prior to Visit   Medication Sig    albuterol (PROAIR HFA) 108 (90 Base) MCG/ACT inhaler Inhale 2 puffs Every 4 (Four) Hours As Needed for Shortness of Air.    allopurinol (ZYLOPRIM) 100 MG tablet Take 1 tablet by mouth Daily.    cetirizine (zyrTEC) 10 MG tablet Take 1 tablet by mouth Daily.    cholecalciferol (VITAMIN D3) 1000 units tablet Take 1 tablet by mouth Daily.    Eliquis 5 MG tablet tablet TAKE ONE TABLET BY MOUTH EVERY 12 HOURS    furosemide (LASIX) 20 MG tablet Take 1 tablet by mouth As Needed.    hydroCHLOROthiazide (MICROZIDE) 12.5 MG capsule Take 1 capsule by mouth Daily.    lisinopril (PRINIVIL,ZESTRIL) 5 MG tablet Take 1 tablet by mouth Daily.    metoprolol succinate XL (TOPROL-XL) 25 MG 24 hr tablet TAKE TWO TABLETS BY MOUTH EVERY DAY    Omega-3 Fatty Acids (fish oil) 1000 MG capsule capsule Take 1 capsule by mouth Daily.    Ozempic, 0.25 or 0.5 MG/DOSE, 2 MG/3ML solution pen-injector Inject 0.25 mg under the skin into the appropriate area as directed 1 (One) Time Per Week.    rOPINIRole (REQUIP) 3 MG tablet Take 1 tablet by mouth Daily.     No current facility-administered medications on file prior to visit.     (Not in a hospital admission)      Results for orders placed during the hospital encounter of 09/20/22    Adult Transthoracic Echo Complete W/ Cont if Necessary  Per Protocol    Interpretation Summary  · Technically difficult echo and Doppler study was obtained.  · Normal left ventricular cavity size noted.  · Left ventricular wall thickness is consistent with mild concentric hypertrophy.  · Left ventricular ejection fraction appears to be 61 - 65%. Left ventricular systolic function is normal.  · Left ventricular diastolic function is consistent with (grade II w/high LAP) pseudonormalization.  · Aortic valve is normal, no aortic stenosis or regurgitation noted.  · Mitral valve appears normal, no mitral stenosis or regurgitation noted.  · Mild tricuspid regurgitation with mild pulmonary hypertension noted.  · No pericardial effusion detected.    Results for orders placed during the hospital encounter of 08/01/17    Stress Test With Myocardial Perfusion One Day    Interpretation Summary  · Breast attenuation and GI artifacts are present.  · Left ventricular ejection fraction is hyperdynamic (Calculated EF > 70%).  · Myocardial perfusion imaging indicates a normal myocardial perfusion study with no evidence of ischemia.  · Impressions are consistent with a low risk study.  · Findings consistent with a normal ECG stress test.  · Fixed ant wall defect with good wall motion, probably representing breast attenuation.          The following portions of the patient's history were reviewed and updated as appropriate: allergies, current medications, past family history, past medical history, past social history, past surgical history and problem list.    Review of Systems   Constitutional: Negative.   HENT: Negative.  Negative for congestion.    Eyes: Negative.    Cardiovascular: Negative.  Negative for chest pain, cyanosis, dyspnea on exertion, irregular heartbeat, leg swelling, near-syncope, orthopnea, palpitations, paroxysmal nocturnal dyspnea and syncope.   Respiratory: Negative.  Negative for shortness of breath.    Hematologic/Lymphatic: Negative.    Musculoskeletal: Negative.   "  Gastrointestinal: Negative.    Neurological: Negative.  Negative for headaches.          Objective:     /86 (BP Location: Left arm, Patient Position: Sitting, Cuff Size: Large Adult)   Pulse 70   Ht 182.9 cm (72\")   Wt 129 kg (284 lb)   SpO2 97%   BMI 38.52 kg/m²   Pulmonary:      Effort: Pulmonary effort is normal.      Breath sounds: Normal breath sounds. No stridor. No wheezing. No rhonchi. No rales.   Cardiovascular:      PMI at left midclavicular line. Normal rate. Regular rhythm. Normal S1. Normal S2.       Murmurs: There is no murmur.      No gallop.  No click. No rub.   Pulses:     Intact distal pulses.   Edema:     Peripheral edema absent.           Lab Review      Lab work scheduled including T4 TSH, CBC, CMP, CK and lipid panel              ECG 12 Lead    Date/Time: 11/8/2024 1:32 PM  Performed by: Darian Richard MD    Authorized by: Darian Richard MD  Comparison: compared with previous ECG from 7/23/2024  Similar to previous ECG  Rhythm: atrial fibrillation  Rate: normal  BPM: 77  Conduction: conduction normal  QRS axis: normal  Other findings: non-specific ST-T wave changes    Clinical impression: abnormal EKG             I personally viewed and interpreted the patient's LAB data         Assessment:     1. Paroxysmal atrial fibrillation, [id number] 3    2. Mixed hyperlipidemia    3. Chronic anticoagulation with Eliquis    4. Essential hypertension    5. Paroxysmal atrial fibrillation          Plan:     Paroxysmal atrial fibrillation  She was maintaining sinus rhythm on flecainide therapy.  Last documented sinus rhythm was on March 7, 2024.  Patient cannot tell when she is in atrial fibrillation or sinus rhythm.  She had an EKG done at her doctor's office on July 23, 2024.  EKG was reviewed today .  It showed atrial fibrillation  Today's EKG also shows atrial fibrillation.  Patient was advised discontinue flecainide but is not working she will continue Eliquis patient is " totally asymptomatic rate control versus rhythm control was discussed.  She will return in 2 weeks and will decide regarding cardioversion and alternate antiarrhythmic therapy.    Hyperlipidemia  She will take Crestor 10 mg daily, lab work scheduled.    Blood pressure is very well-controlled with lisinopril which was continued.    Healthy lifestyle and weight loss emphasized.    She is currently taking Ozempic.    Follow-up scheduled      No follow-ups on file.

## 2024-11-07 NOTE — TELEPHONE ENCOUNTER
----- Message from Darian Richard sent at 11/7/2024 11:31 AM EST -----  Your cholesterol is high.  LDL cholesterol is around 128.  Increase Crestor 10 mg daily instead of 5

## 2024-11-21 ENCOUNTER — OFFICE VISIT (OUTPATIENT)
Dept: CARDIOLOGY | Facility: CLINIC | Age: 59
End: 2024-11-21
Payer: MEDICARE

## 2024-11-21 VITALS
HEIGHT: 72 IN | DIASTOLIC BLOOD PRESSURE: 92 MMHG | SYSTOLIC BLOOD PRESSURE: 126 MMHG | BODY MASS INDEX: 38.19 KG/M2 | HEART RATE: 95 BPM | OXYGEN SATURATION: 97 % | WEIGHT: 282 LBS

## 2024-11-21 DIAGNOSIS — Z79.01 CHRONIC ANTICOAGULATION: ICD-10-CM

## 2024-11-21 DIAGNOSIS — E78.2 MIXED HYPERLIPIDEMIA: ICD-10-CM

## 2024-11-21 DIAGNOSIS — I10 ESSENTIAL HYPERTENSION: ICD-10-CM

## 2024-11-21 DIAGNOSIS — I48.19 ATRIAL FIBRILLATION, PERSISTENT: Primary | ICD-10-CM

## 2024-11-21 PROBLEM — I48.0 PAROXYSMAL ATRIAL FIBRILLATION: Status: RESOLVED | Noted: 2022-08-15 | Resolved: 2024-11-21

## 2024-11-21 NOTE — LETTER
November 21, 2024     LOLIS Kong  [address]  [facility] [address]    Patient: Coni Tubbs   YOB: 1965   Date of Visit: 11/21/2024       Dear LOLIS Kong    Coni Tubbs was in my office today. Below is a copy of my note.    If you have questions, please do not hesitate to call me. I look forward to following Coni along with you.         Sincerely,        Darian Richard MD        CC: Simeon Aldrich Sr., DMD    subjective     Chief Complaint   Patient presents with   • Results     Labs and med change   • Atrial Fibrillation     Follow up       Problems Addressed This Visit  No diagnosis found.    History of Present Illness          Past Surgical History:   Procedure Laterality Date   • CARDIOVASCULAR STRESS TEST  08/02/2017   • ECHO - CONVERTED  07/05/2017   • KNEE ARTHROSCOPY Right 2004   • SKIN CANCER EXCISION Left 12/2017     Family History   Problem Relation Age of Onset   • Heart attack Mother    • Heart disease Mother    • Diabetes Mother    • Kidney failure Mother    • Obesity Mother    • Kidney disease Father    • COPD Father    • Stroke Sister    • Hypertension Sister    • COPD Brother    • Heart disease Sister    • Heart attack Sister    • Heart failure Sister      Past Medical History:   Diagnosis Date   • Emphysema, unspecified    • Hypertension    • Restless leg syndrome      Patient Active Problem List   Diagnosis   • Chest pain in adult   • Essential hypertension   • Bilateral leg edema   • Gouty arthritis   • Obstructive sleep apnea   • MORENO (dyspnea on exertion)   • Palpitations   • Paroxysmal atrial fibrillation, [id number] 3   • Chronic anticoagulation with Eliquis   • Mixed hyperlipidemia   • Type 2 diabetes mellitus without complication, without long-term current use of insulin   • Obesity (BMI 35.0-39.9 without comorbidity)       Social History     Tobacco Use   • Smoking status: Never   • Smokeless tobacco: Never   Vaping Use   •  Vaping status: Never Used   Substance Use Topics   • Alcohol use: No     Comment:  occ  3-4 times a year   • Drug use: No       Allergies   Allergen Reactions   • Cozaar [Losartan] Hives   • Levaquin [Levofloxacin] Hives   • Penicillins Other (See Comments)     Unknown    • Clindamycin/Lincomycin Itching and Rash   • Sulfa Antibiotics Itching and Rash   • Vancomycin Itching and Rash       Current Outpatient Medications on File Prior to Visit   Medication Sig   • albuterol (PROAIR HFA) 108 (90 Base) MCG/ACT inhaler Inhale 2 puffs Every 4 (Four) Hours As Needed for Shortness of Air.   • allopurinol (ZYLOPRIM) 100 MG tablet Take 1 tablet by mouth Daily.   • cetirizine (zyrTEC) 10 MG tablet Take 1 tablet by mouth Daily.   • cholecalciferol (VITAMIN D3) 1000 units tablet Take 1 tablet by mouth Daily.   • Eliquis 5 MG tablet tablet TAKE ONE TABLET BY MOUTH EVERY 12 HOURS   • furosemide (LASIX) 20 MG tablet Take 1 tablet by mouth As Needed.   • hydroCHLOROthiazide (MICROZIDE) 12.5 MG capsule Take 1 capsule by mouth Daily.   • lisinopril (PRINIVIL,ZESTRIL) 5 MG tablet Take 1 tablet by mouth Daily.   • metoprolol succinate XL (TOPROL-XL) 25 MG 24 hr tablet TAKE TWO TABLETS BY MOUTH EVERY DAY   • Omega-3 Fatty Acids (fish oil) 1000 MG capsule capsule Take 1 capsule by mouth Daily.   • Ozempic, 0.25 or 0.5 MG/DOSE, 2 MG/3ML solution pen-injector Inject 0.25 mg under the skin into the appropriate area as directed 1 (One) Time Per Week.   • rOPINIRole (REQUIP) 3 MG tablet Take 1 tablet by mouth Daily.   • rosuvastatin (CRESTOR) 10 MG tablet Take 1 tablet by mouth Daily.     No current facility-administered medications on file prior to visit.     (Not in a hospital admission)      Results for orders placed during the hospital encounter of 09/20/22    Adult Transthoracic Echo Complete W/ Cont if Necessary Per Protocol    Interpretation Summary  · Technically difficult echo and Doppler study was obtained.  · Normal left ventricular  "cavity size noted.  · Left ventricular wall thickness is consistent with mild concentric hypertrophy.  · Left ventricular ejection fraction appears to be 61 - 65%. Left ventricular systolic function is normal.  · Left ventricular diastolic function is consistent with (grade II w/high LAP) pseudonormalization.  · Aortic valve is normal, no aortic stenosis or regurgitation noted.  · Mitral valve appears normal, no mitral stenosis or regurgitation noted.  · Mild tricuspid regurgitation with mild pulmonary hypertension noted.  · No pericardial effusion detected.    Results for orders placed during the hospital encounter of 08/01/17    Stress Test With Myocardial Perfusion One Day    Interpretation Summary  · Breast attenuation and GI artifacts are present.  · Left ventricular ejection fraction is hyperdynamic (Calculated EF > 70%).  · Myocardial perfusion imaging indicates a normal myocardial perfusion study with no evidence of ischemia.  · Impressions are consistent with a low risk study.  · Findings consistent with a normal ECG stress test.  · Fixed ant wall defect with good wall motion, probably representing breast attenuation.          The following portions of the patient's history were reviewed and updated as appropriate: allergies, current medications, past family history, past medical history, past social history, past surgical history and problem list.    ROS       Objective:     /92 (BP Location: Left arm, Patient Position: Sitting, Cuff Size: Adult)   Pulse 95   Ht 182.9 cm (72\")   Wt 128 kg (282 lb)   SpO2 97%   BMI 38.25 kg/m²   Physical Exam      Lab Review                      Procedures       I personally viewed and interpreted the patient's LAB data         Assessment:   No diagnosis found.      Plan:              No follow-ups on file.     "

## 2024-11-21 NOTE — PROGRESS NOTES
subjective     Chief Complaint   Patient presents with    Results     Labs and med change    Atrial Fibrillation     Follow up       Problems Addressed This Visit  1. Atrial fibrillation, persistent    2. Mixed hyperlipidemia    3. Essential hypertension    4. Chronic anticoagulation with Eliquis        History of Present Illness    Persistent atrial fibrillation  Patient is 59 years old white female who has history of paroxysmal atrial fibrillation with [id number] 3.  Atrial fibrillation was initially diagnosed 8/15/2022    He was started on sotalol along with Eliquis and maintained sinus rhythm until August 25, 2023.  Tambocor dose was increased and she maintained sinus rhythm  Till 7/23/2024.  She had been taking flecainide 100 twice daily which was discontinued on 11/7/2024.    Cardioversion and sotalol therapy was planned.  She is here for follow-up and is still in atrial fibrillation.  She is not taking any antiarrhythmic medication but she is taking Eliquis 5 twice daily.  She is taking metoprolol ER 50 mg daily.  Heart rate however is fast today, she did not take metoprolol today.  She was advised to take metoprolol ER 50 mg daily and will plan for cardioversion.  Will discuss with Dr. Martinez depending on her schedule.  Patient also was scheduled for EP consult with Dr. Ospina.    Hyperlipidemia  LDL was elevated and her Lipitor dose was increased will check lab work next visit.    Hypertension blood pressure is well-controlled on lisinopril.    Obesity  Patient is morbidly obese and has been on Ozempic and is trying to diet also.    Past Surgical History:   Procedure Laterality Date    CARDIOVASCULAR STRESS TEST  08/02/2017    ECHO - CONVERTED  07/05/2017    KNEE ARTHROSCOPY Right 2004    SKIN CANCER EXCISION Left 12/2017     Family History   Problem Relation Age of Onset    Heart attack Mother     Heart disease Mother     Diabetes Mother     Kidney failure Mother     Obesity Mother     Kidney disease  Father     COPD Father     Stroke Sister     Hypertension Sister     COPD Brother     Heart disease Sister     Heart attack Sister     Heart failure Sister      Past Medical History:   Diagnosis Date    Emphysema, unspecified     Hypertension     Restless leg syndrome      Patient Active Problem List   Diagnosis    Chest pain in adult    Essential hypertension    Bilateral leg edema    Gouty arthritis    Obstructive sleep apnea    MORENO (dyspnea on exertion)    Palpitations    Chronic anticoagulation with Eliquis    Mixed hyperlipidemia    Type 2 diabetes mellitus without complication, without long-term current use of insulin    Obesity (BMI 35.0-39.9 without comorbidity)    Atrial fibrillation, persistent       Social History     Tobacco Use    Smoking status: Never    Smokeless tobacco: Never   Vaping Use    Vaping status: Never Used   Substance Use Topics    Alcohol use: No     Comment:  occ  3-4 times a year    Drug use: No       Allergies   Allergen Reactions    Cozaar [Losartan] Hives    Levaquin [Levofloxacin] Hives    Penicillins Other (See Comments)     Unknown     Clindamycin/Lincomycin Itching and Rash    Sulfa Antibiotics Itching and Rash    Vancomycin Itching and Rash       Current Outpatient Medications on File Prior to Visit   Medication Sig    albuterol (PROAIR HFA) 108 (90 Base) MCG/ACT inhaler Inhale 2 puffs Every 4 (Four) Hours As Needed for Shortness of Air.    allopurinol (ZYLOPRIM) 100 MG tablet Take 1 tablet by mouth Daily.    cetirizine (zyrTEC) 10 MG tablet Take 1 tablet by mouth Daily.    cholecalciferol (VITAMIN D3) 1000 units tablet Take 1 tablet by mouth Daily.    Eliquis 5 MG tablet tablet TAKE ONE TABLET BY MOUTH EVERY 12 HOURS    furosemide (LASIX) 20 MG tablet Take 1 tablet by mouth As Needed.    hydroCHLOROthiazide (MICROZIDE) 12.5 MG capsule Take 1 capsule by mouth Daily.    lisinopril (PRINIVIL,ZESTRIL) 5 MG tablet Take 1 tablet by mouth Daily.    metoprolol succinate XL (TOPROL-XL)  25 MG 24 hr tablet TAKE TWO TABLETS BY MOUTH EVERY DAY    Omega-3 Fatty Acids (fish oil) 1000 MG capsule capsule Take 1 capsule by mouth Daily.    Ozempic, 0.25 or 0.5 MG/DOSE, 2 MG/3ML solution pen-injector Inject 0.25 mg under the skin into the appropriate area as directed 1 (One) Time Per Week.    rOPINIRole (REQUIP) 3 MG tablet Take 1 tablet by mouth Daily.    rosuvastatin (CRESTOR) 10 MG tablet Take 1 tablet by mouth Daily.     No current facility-administered medications on file prior to visit.     (Not in a hospital admission)      Results for orders placed during the hospital encounter of 09/20/22    Adult Transthoracic Echo Complete W/ Cont if Necessary Per Protocol    Interpretation Summary  · Technically difficult echo and Doppler study was obtained.  · Normal left ventricular cavity size noted.  · Left ventricular wall thickness is consistent with mild concentric hypertrophy.  · Left ventricular ejection fraction appears to be 61 - 65%. Left ventricular systolic function is normal.  · Left ventricular diastolic function is consistent with (grade II w/high LAP) pseudonormalization.  · Aortic valve is normal, no aortic stenosis or regurgitation noted.  · Mitral valve appears normal, no mitral stenosis or regurgitation noted.  · Mild tricuspid regurgitation with mild pulmonary hypertension noted.  · No pericardial effusion detected.    Results for orders placed during the hospital encounter of 08/01/17    Stress Test With Myocardial Perfusion One Day    Interpretation Summary  · Breast attenuation and GI artifacts are present.  · Left ventricular ejection fraction is hyperdynamic (Calculated EF > 70%).  · Myocardial perfusion imaging indicates a normal myocardial perfusion study with no evidence of ischemia.  · Impressions are consistent with a low risk study.  · Findings consistent with a normal ECG stress test.  · Fixed ant wall defect with good wall motion, probably representing breast  "attenuation.          The following portions of the patient's history were reviewed and updated as appropriate: allergies, current medications, past family history, past medical history, past social history, past surgical history and problem list.    Review of Systems   Constitutional: Negative.   HENT: Negative.  Negative for congestion.    Eyes: Negative.    Cardiovascular:  Positive for palpitations. Negative for chest pain, cyanosis, dyspnea on exertion, irregular heartbeat, leg swelling, near-syncope, orthopnea, paroxysmal nocturnal dyspnea and syncope.   Respiratory: Negative.  Negative for shortness of breath.    Hematologic/Lymphatic: Negative.    Musculoskeletal: Negative.    Gastrointestinal: Negative.    Neurological: Negative.  Negative for headaches.          Objective:     /92 (BP Location: Left arm, Patient Position: Sitting, Cuff Size: Adult)   Pulse 95   Ht 182.9 cm (72\")   Wt 128 kg (282 lb)   SpO2 97%   BMI 38.25 kg/m²   Pulmonary:      Effort: Pulmonary effort is normal.      Breath sounds: Normal breath sounds. No stridor. No wheezing. No rhonchi. No rales.   Cardiovascular:      PMI at left midclavicular line. Tachycardia present. Irregular rhythm. Normal S1. Normal S2.       Murmurs: There is no murmur.      No gallop.  No click. No rub.   Pulses:     Intact distal pulses.   Edema:     Peripheral edema absent.           Lab Review                        ECG 12 Lead    Date/Time: 11/21/2024 12:45 PM  Performed by: Darian Richard MD    Authorized by: Darian Richard MD  Comparison: compared with previous ECG from 11/7/2024  Comparison to previous ECG: Heart rate is faster today.  Rhythm: atrial fibrillation  Rate: normal  BPM: 95  Conduction: conduction normal  QRS axis: normal  Other findings: non-specific ST-T wave changes    Clinical impression: abnormal EKG             I personally viewed and interpreted the patient's LAB data         Assessment:     1. Atrial " fibrillation, persistent    2. Mixed hyperlipidemia    3. Essential hypertension    4. Chronic anticoagulation with Eliquis          Plan:     Persistent atrial fibrillation  Patient has history of persistent atrial fibrillation initially treated with flecainide and Eliquis.  She did very well and maintained sinus rhythm.  After recurrence of atrial fibrillation dose of flecainide was increased to 100 twice daily.  She converted back to sinus rhythm and did very well for a while.  She now is back in atrial fibrillation.    Eliquis was continued  Tambocor was discontinued  Rate was controlled with metoprolol ER  Plan for external cardioversion and sotalol therapy.    Hyperlipidemia  Crestor dose was increased.  Will check lab work next visit.    Morbid obesity  She is taking Ozempic under directions of her PCP  Healthy lifestyle and diet restriction discussed.    Blood pressure is very well-controlled with lisinopril.    Anticoagulation with Eliquis continued.    Will notify the patient with the dates of cardioversion and admission to the hospital.  This will be done after Thanksgiving.      No follow-ups on file.

## 2024-12-02 ENCOUNTER — TELEPHONE (OUTPATIENT)
Dept: CARDIOLOGY | Facility: CLINIC | Age: 59
End: 2024-12-02
Payer: MEDICARE

## 2024-12-02 NOTE — TELEPHONE ENCOUNTER
SPOKE WITH PT, ADV DR. GARCIA MED ASSISTANT IS NOT IN OFFICE AND WILL CALL HER BACK IN A DAY OR TWO TO DISCUSS HER RYAN AND SCHEDULING IT.

## 2024-12-02 NOTE — TELEPHONE ENCOUNTER
Caller: Coni Tubbs    Relationship: Self    Best call back number: [phone]     What is the best time to reach you: ANY     Who are you requesting to speak with (clinical staff, provider,  specific staff member): CLINICAL     What was the call regarding: STATES SHE WAS CALLED LAST WEEK, TOLD OFFICE WANTED TO GET AFIB UNDER CONTROL. PT WAS TOLD SHE WOULD HAVE A PROCEDURE AT [facility] FOR [facility], AND TO CALL US BACK WITH A DATE AFTER THANKSGIVING SHE WOULD LIKE TO HAVE THIS DONE. PT WOULD LIKE THIS TO BE DONE ON 12/9. PLEASE ADVISE.     Is it okay if the provider responds through [employer]: CALL

## 2024-12-05 ENCOUNTER — OFFICE VISIT (OUTPATIENT)
Dept: CARDIOLOGY | Facility: CLINIC | Age: 59
End: 2024-12-05
Payer: MEDICARE

## 2024-12-05 ENCOUNTER — TELEPHONE (OUTPATIENT)
Dept: CARDIOLOGY | Facility: CLINIC | Age: 59
End: 2024-12-05
Payer: MEDICARE

## 2024-12-05 VITALS
HEART RATE: 94 BPM | RESPIRATION RATE: 18 BRPM | BODY MASS INDEX: 37.79 KG/M2 | OXYGEN SATURATION: 98 % | DIASTOLIC BLOOD PRESSURE: 83 MMHG | SYSTOLIC BLOOD PRESSURE: 123 MMHG | HEIGHT: 72 IN | WEIGHT: 279 LBS

## 2024-12-05 DIAGNOSIS — I10 ESSENTIAL HYPERTENSION: ICD-10-CM

## 2024-12-05 DIAGNOSIS — Z79.01 CHRONIC ANTICOAGULATION: ICD-10-CM

## 2024-12-05 DIAGNOSIS — I48.19 ATRIAL FIBRILLATION, PERSISTENT: Primary | ICD-10-CM

## 2024-12-05 NOTE — PROGRESS NOTES
Donte Finney, LOLIS  No ref. provider found    Coni Tubbs  1965 12/05/2024    Subjective     Coni Tubbs is a 59 y.o. female who presents today to [facility] CARDIOLOGY for Atrial fibrillation, persistent.    History of Present Illness:    Patient is a pleasant 59 years old  female with past medical history significant for persistent atrial fibrillation [id number] 3 on Eliquis, hypertension, hyperlipidemia who presents today to discuss sotalol loading.  Patient follows Dr. Richard and is planned for inpatient admission for sotalol loading.  Patient was diagnosed with atrial fibrillation on 8/15/2022.  She was started on flecainide and Eliquis and was able to maintain sinus rhythm until August 25, 2023.  Her flecainide dose was increased and she continued to maintain sinus rhythm until 7/23/2024 when she went back into A-fib.  She states that during that time, she was at a point where she had stopped taking medications and taking good care of herself.  She became back to her normal baseline and was motivated and restarted on flecainide but however could not sustain normal rhythm.  She was in flecainide 100 Mg twice daily which was eventually discontinued on 11/7/2024 with plans to be started on sotalol.  Patient today presents to discuss sotalol.  She is currently in A-fib, rate controlled.  She denies any chest pain, palpitations, lightheadedness, dyspnea , shortness of breath.  I discussed the need for inpatient admission and close monitoring for at least 5 doses of sotalol with regular EKG after each dose and daily BMP and magnesium.  Will plan for DCCV if patient fails to convert to sinus rhythm with sotalol during that admission.  Patient voiced understanding.  Patient would like to come to the hospital Monday [12/9] morning for admission and will tentatively plan for DCCV for persistent A-fib on Wednesday.    Allergies   Allergen Reactions    Cozaar  [Losartan] Hives    Levaquin [Levofloxacin] Hives    Penicillins Other (See Comments)     Unknown     Clindamycin/Lincomycin Itching and Rash    Sulfa Antibiotics Itching and Rash    Vancomycin Itching and Rash   :    Current Outpatient Medications:     albuterol (PROAIR HFA) 108 (90 Base) MCG/ACT inhaler, Inhale 2 puffs Every 4 (Four) Hours As Needed for Shortness of Air., Disp: 1 inhaler, Rfl: 11    allopurinol (ZYLOPRIM) 100 MG tablet, Take 1 tablet by mouth Daily., Disp: , Rfl: 2    cetirizine (zyrTEC) 10 MG tablet, Take 1 tablet by mouth Daily., Disp: , Rfl:     cholecalciferol (VITAMIN D3) 1000 units tablet, Take 1 tablet by mouth Daily., Disp: , Rfl:     Eliquis 5 MG tablet tablet, TAKE ONE TABLET BY MOUTH EVERY 12 HOURS, Disp: 60 tablet, Rfl: 1    furosemide (LASIX) 20 MG tablet, Take 1 tablet by mouth As Needed., Disp: , Rfl:     lisinopril (PRINIVIL,ZESTRIL) 5 MG tablet, Take 1 tablet by mouth Daily., Disp: 90 tablet, Rfl: 1    metoprolol succinate XL (TOPROL-XL) 25 MG 24 hr tablet, TAKE TWO TABLETS BY MOUTH EVERY DAY, Disp: 180 tablet, Rfl: 1    Omega-3 Fatty Acids (fish oil) 1000 MG capsule capsule, Take 1 capsule by mouth Daily., Disp: , Rfl:     Ozempic, 0.25 or 0.5 MG/DOSE, 2 MG/3ML solution pen-injector, Inject 0.25 mg under the skin into the appropriate area as directed 1 (One) Time Per Week., Disp: , Rfl:     rOPINIRole (REQUIP) 3 MG tablet, Take 1 tablet by mouth Daily., Disp: , Rfl:     rosuvastatin (CRESTOR) 10 MG tablet, Take 1 tablet by mouth Daily., Disp: 90 tablet, Rfl: 1    Past Medical History:   Diagnosis Date    Emphysema, unspecified     Hypertension     Restless leg syndrome      Past Surgical History:   Procedure Laterality Date    CARDIOVASCULAR STRESS TEST  08/02/2017    ECHO - CONVERTED  07/05/2017    KNEE ARTHROSCOPY Right 2004    SKIN CANCER EXCISION Left 12/2017     Family History   Problem Relation Age of Onset    Heart attack Mother     Heart disease Mother     Diabetes Mother  "    Kidney failure Mother     Obesity Mother     Kidney disease Father     COPD Father     Stroke Sister     Hypertension Sister     COPD Brother     Heart disease Sister     Heart attack Sister     Heart failure Sister      Social History     Tobacco Use    Smoking status: Never    Smokeless tobacco: Never   Vaping Use    Vaping status: Never Used   Substance Use Topics    Alcohol use: No     Comment:  occ  3-4 times a year    Drug use: No       Objective   Blood pressure 123/83, pulse 94, resp. rate 18, height 182.9 cm (72\"), weight 127 kg (279 lb), SpO2 98%.  Body mass index is 37.84 kg/m².    Constitutional:       Appearance: Not in distress.   Neck:      Vascular: JVD normal.   Pulmonary:      Effort: Pulmonary effort is normal.      Breath sounds: Normal breath sounds.   Cardiovascular:      PMI at left midclavicular line. Normal rate. Irregularly irregular rhythm. Normal S1. Normal S2.       Murmurs: There is no murmur.      No gallop.  No click. No rub.   Pulses:     Intact distal pulses.   Edema:     Peripheral edema absent.   Skin:     General: Skin is warm and dry.   Neurological:      Mental Status: Alert and oriented to person, place and time.           DATA:   Results for orders placed during the hospital encounter of 09/20/22    Adult Transthoracic Echo Complete W/ Cont if Necessary Per Protocol    Interpretation Summary  · Technically difficult echo and Doppler study was obtained.  · Normal left ventricular cavity size noted.  · Left ventricular wall thickness is consistent with mild concentric hypertrophy.  · Left ventricular ejection fraction appears to be 61 - 65%. Left ventricular systolic function is normal.  · Left ventricular diastolic function is consistent with (grade II w/high LAP) pseudonormalization.  · Aortic valve is normal, no aortic stenosis or regurgitation noted.  · Mitral valve appears normal, no mitral stenosis or regurgitation noted.  · Mild tricuspid regurgitation with mild " "pulmonary hypertension noted.  · No pericardial effusion detected.   Results for orders placed during the hospital encounter of 08/01/17    Stress Test With Myocardial Perfusion One Day    Interpretation Summary  · Breast attenuation and GI artifacts are present.  · Left ventricular ejection fraction is hyperdynamic (Calculated EF > 70%).  · Myocardial perfusion imaging indicates a normal myocardial perfusion study with no evidence of ischemia.  · Impressions are consistent with a low risk study.  · Findings consistent with a normal ECG stress test.  · Fixed ant wall defect with good wall motion, probably representing breast attenuation.   Results for orders placed during the hospital encounter of 08/01/17    Stress Test With Myocardial Perfusion One Day    Interpretation Summary  · Breast attenuation and GI artifacts are present.  · Left ventricular ejection fraction is hyperdynamic (Calculated EF > 70%).  · Myocardial perfusion imaging indicates a normal myocardial perfusion study with no evidence of ischemia.  · Impressions are consistent with a low risk study.  · Findings consistent with a normal ECG stress test.  · Fixed ant wall defect with good wall motion, probably representing breast attenuation.        No results found for: \"BNP\"  No results found for: \"PSA\"   No results found for: \"MG\"  No results found for: \"INR\"  No results found for: \"CKTOTAL\"  Lab Results   Component Value Date    CHOL 151 08/01/2017     Lab Results   Component Value Date    TRIG 154 (H) 08/01/2017     Lab Results   Component Value Date    HDL 29 (L) 08/01/2017     Lab Results   Component Value Date    LDL 91 08/01/2017     No components found for: \"A1C\"      Lab Results   Component Value Date    TSH 3.369 08/01/2017             Invalid input(s): \"LABALBU\", \"PROT\"        Results review: During today's encounter, all relevant clinical data has been reviewed.        ECG 12 Lead    Date/Time: 12/5/2024 3:29 PM  Performed by: Michelle " MD Alisia    Authorized by: Alisia Martinez MD  Comparison: compared with previous ECG   Similar to previous ECG  Rhythm: atrial fibrillation  Rate: normal  BPM: 87  QRS axis: left    Clinical impression: abnormal EKG          Assessment & Plan    Diagnosis Plan   1. Atrial fibrillation, persistent        2. Chronic anticoagulation with Eliquis        3. Essential hypertension            Recommendations  Orders Placed This Encounter   Procedures    ECG 12 Lead        Patient will come to the ER on 12/9 morning to get admitted for sotalol loading.  Patient will get initial blood works done to look for renal function, potassium and magnesium level.  Patient will be monitored closely for 5 doses of sotalol with EKG after each sotalol dose.  Will plan for DCCV if patient fails to convert to sinus rhythm on sotalol, tentatively on 12/11.  Patient is compliant with her Eliquis and has not missed a single dose in at least 4 weeks.  Continue Eliquis 5 Mg twice daily.  Discontinue hydrochlorothiazide.  Continue lisinopril 5 Mg daily, Toprol-XL 50 Mg daily.        New Medications:   No orders of the defined types were placed in this encounter.      Discontinued Medications:   Medications Discontinued During This Encounter   Medication Reason    hydroCHLOROthiazide (MICROZIDE) 12.5 MG capsule Patient Reported Not Taking        Patient will follow-up with Dr. Richard as scheduled.      Thank you for allowing me to participate in the care of Coni Tubbs. Feel free to contact me directly with any further questions or concerns.      This document has been electronically signed by Alisia Martinez MD   December 5, 2024 15:30 EST    Dictated Utilizing Dragon Dictation: Part of this note may be an electronic transcription/translation of spoken language to printed text using the Dragon Dictation System.

## 2024-12-09 ENCOUNTER — APPOINTMENT (OUTPATIENT)
Dept: GENERAL RADIOLOGY | Facility: HOSPITAL | Age: 59
DRG: 310 | End: 2024-12-09
Payer: MEDICARE

## 2024-12-09 ENCOUNTER — HOSPITAL ENCOUNTER (INPATIENT)
Facility: HOSPITAL | Age: 59
LOS: 1 days | Discharge: HOME | DRG: 310 | End: 2024-12-11
Attending: EMERGENCY MEDICINE | Admitting: INTERNAL MEDICINE
Payer: MEDICARE

## 2024-12-09 DIAGNOSIS — I48.91 ATRIAL FIBRILLATION, UNSPECIFIED TYPE: Primary | ICD-10-CM

## 2024-12-09 LAB
ABSOLUTE LUNG FLUID CONTENT: 34 % (ref 20–35)
ALBUMIN SERPL-MCNC: 3.7 G/DL (ref 3.5–5.2)
ALBUMIN/GLOB SERPL: 1 G/DL
ALP SERPL-CCNC: 67 U/L (ref 39–117)
ALT SERPL W P-5'-P-CCNC: 12 U/L (ref 1–33)
ANION GAP SERPL CALCULATED.3IONS-SCNC: 10.7 MMOL/L (ref 5–15)
AST SERPL-CCNC: 14 U/L (ref 1–32)
BACTERIA UR QL AUTO: ABNORMAL /HPF
BASOPHILS # BLD AUTO: 0.04 10*3/MM3 (ref 0–0.2)
BASOPHILS NFR BLD AUTO: 0.5 % (ref 0–1.5)
BILIRUB SERPL-MCNC: 0.6 MG/DL (ref 0–1.2)
BILIRUB UR QL STRIP: ABNORMAL
BUN SERPL-MCNC: 18 MG/DL (ref 8–23)
BUN/CREAT SERPL: 22 (ref 7–25)
CALCIUM SPEC-SCNC: 9.4 MG/DL (ref 8.6–10.5)
CHLORIDE SERPL-SCNC: 105 MMOL/L (ref 98–107)
CLARITY UR: ABNORMAL
CO2 SERPL-SCNC: 21.3 MMOL/L (ref 22–29)
COLOR UR: ABNORMAL
CREAT SERPL-MCNC: 0.82 MG/DL (ref 0.57–1)
DEPRECATED RDW RBC AUTO: 47.2 FL (ref 37–54)
EGFRCR SERPLBLD CKD-EPI 2021: 82 ML/MIN/1.73
EOSINOPHIL # BLD AUTO: 0.49 10*3/MM3 (ref 0–0.4)
EOSINOPHIL NFR BLD AUTO: 5.6 % (ref 0.3–6.2)
ERYTHROCYTE [DISTWIDTH] IN BLOOD BY AUTOMATED COUNT: 13 % (ref 12.3–15.4)
GEN 5 1HR TROPONIN T REFLEX: 9 NG/L
GLOBULIN UR ELPH-MCNC: 3.6 GM/DL
GLUCOSE SERPL-MCNC: 252 MG/DL (ref 65–99)
GLUCOSE UR STRIP-MCNC: NEGATIVE MG/DL
HCT VFR BLD AUTO: 42.8 % (ref 34–46.6)
HGB BLD-MCNC: 14.7 G/DL (ref 12–15.9)
HGB UR QL STRIP.AUTO: ABNORMAL
HOLD SPECIMEN: NORMAL
HYALINE CASTS UR QL AUTO: ABNORMAL /LPF
IMM GRANULOCYTES # BLD AUTO: 0.02 10*3/MM3 (ref 0–0.05)
IMM GRANULOCYTES NFR BLD AUTO: 0.2 % (ref 0–0.5)
KETONES UR QL STRIP: ABNORMAL
LEUKOCYTE ESTERASE UR QL STRIP.AUTO: ABNORMAL
LYMPHOCYTES # BLD AUTO: 4.08 10*3/MM3 (ref 0.7–3.1)
LYMPHOCYTES NFR BLD AUTO: 46.3 % (ref 19.6–45.3)
MAGNESIUM SERPL-MCNC: 1.6 MG/DL (ref 1.6–2.4)
MCH RBC QN AUTO: 34 PG (ref 26.6–33)
MCHC RBC AUTO-ENTMCNC: 34.3 G/DL (ref 31.5–35.7)
MCV RBC AUTO: 99.1 FL (ref 79–97)
MONOCYTES # BLD AUTO: 0.67 10*3/MM3 (ref 0.1–0.9)
MONOCYTES NFR BLD AUTO: 7.6 % (ref 5–12)
NEUTROPHILS NFR BLD AUTO: 3.51 10*3/MM3 (ref 1.7–7)
NEUTROPHILS NFR BLD AUTO: 39.8 % (ref 42.7–76)
NITRITE UR QL STRIP: NEGATIVE
NRBC BLD AUTO-RTO: 0 /100 WBC (ref 0–0.2)
NT-PROBNP SERPL-MCNC: 741.8 PG/ML (ref 0–900)
PH UR STRIP.AUTO: 5.5 [PH] (ref 5–8)
PLATELET # BLD AUTO: 180 10*3/MM3 (ref 140–450)
PMV BLD AUTO: 9.9 FL (ref 6–12)
POTASSIUM SERPL-SCNC: 4 MMOL/L (ref 3.5–5.2)
PROT SERPL-MCNC: 7.3 G/DL (ref 6–8.5)
PROT UR QL STRIP: ABNORMAL
QT INTERVAL: 350 MS
QT INTERVAL: 394 MS
QTC INTERVAL: 435 MS
QTC INTERVAL: 439 MS
RBC # BLD AUTO: 4.32 10*6/MM3 (ref 3.77–5.28)
RBC # UR STRIP: ABNORMAL /HPF
REF LAB TEST METHOD: ABNORMAL
SODIUM SERPL-SCNC: 137 MMOL/L (ref 136–145)
SP GR UR STRIP: 1.03 (ref 1–1.03)
SQUAMOUS #/AREA URNS HPF: ABNORMAL /HPF
TROPONIN T NUMERIC DELTA: -1 NG/L
TROPONIN T SERPL HS-MCNC: 10 NG/L
TSH SERPL DL<=0.05 MIU/L-ACNC: 2.24 UIU/ML (ref 0.27–4.2)
UROBILINOGEN UR QL STRIP: ABNORMAL
WBC # UR STRIP: ABNORMAL /HPF
WBC NRBC COR # BLD AUTO: 8.81 10*3/MM3 (ref 3.4–10.8)
WHOLE BLOOD HOLD COAG: NORMAL
WHOLE BLOOD HOLD SPECIMEN: NORMAL

## 2024-12-09 PROCEDURE — 71045 X-RAY EXAM CHEST 1 VIEW: CPT

## 2024-12-09 PROCEDURE — 36415 COLL VENOUS BLD VENIPUNCTURE: CPT | Performed by: EMERGENCY MEDICINE

## 2024-12-09 PROCEDURE — 25010000002 MAGNESIUM SULFATE 4 GM/100ML SOLUTION: Performed by: INTERNAL MEDICINE

## 2024-12-09 PROCEDURE — 85025 COMPLETE CBC W/AUTO DIFF WBC: CPT | Performed by: EMERGENCY MEDICINE

## 2024-12-09 PROCEDURE — 99285 EMERGENCY DEPT VISIT HI MDM: CPT

## 2024-12-09 PROCEDURE — 99222 1ST HOSP IP/OBS MODERATE 55: CPT | Performed by: INTERNAL MEDICINE

## 2024-12-09 PROCEDURE — 71045 X-RAY EXAM CHEST 1 VIEW: CPT | Performed by: RADIOLOGY

## 2024-12-09 PROCEDURE — 93005 ELECTROCARDIOGRAM TRACING: CPT | Performed by: INTERNAL MEDICINE

## 2024-12-09 PROCEDURE — 83735 ASSAY OF MAGNESIUM: CPT | Performed by: EMERGENCY MEDICINE

## 2024-12-09 PROCEDURE — 93005 ELECTROCARDIOGRAM TRACING: CPT

## 2024-12-09 PROCEDURE — 94726 PLETHYSMOGRAPHY LUNG VOLUMES: CPT

## 2024-12-09 PROCEDURE — G0378 HOSPITAL OBSERVATION PER HR: HCPCS

## 2024-12-09 PROCEDURE — 81001 URINALYSIS AUTO W/SCOPE: CPT | Performed by: EMERGENCY MEDICINE

## 2024-12-09 PROCEDURE — 84484 ASSAY OF TROPONIN QUANT: CPT | Performed by: EMERGENCY MEDICINE

## 2024-12-09 PROCEDURE — 83880 ASSAY OF NATRIURETIC PEPTIDE: CPT | Performed by: EMERGENCY MEDICINE

## 2024-12-09 PROCEDURE — 93010 ELECTROCARDIOGRAM REPORT: CPT | Performed by: SPECIALIST

## 2024-12-09 PROCEDURE — 80053 COMPREHEN METABOLIC PANEL: CPT | Performed by: EMERGENCY MEDICINE

## 2024-12-09 PROCEDURE — 93005 ELECTROCARDIOGRAM TRACING: CPT | Performed by: EMERGENCY MEDICINE

## 2024-12-09 PROCEDURE — 84443 ASSAY THYROID STIM HORMONE: CPT | Performed by: EMERGENCY MEDICINE

## 2024-12-09 RX ORDER — ALLOPURINOL 100 MG/1
100 TABLET ORAL DAILY
Status: DISCONTINUED | OUTPATIENT
Start: 2024-12-09 | End: 2024-12-11 | Stop reason: HOSPADM

## 2024-12-09 RX ORDER — NITROGLYCERIN 0.4 MG/1
0.4 TABLET SUBLINGUAL
Status: DISCONTINUED | OUTPATIENT
Start: 2024-12-09 | End: 2024-12-11 | Stop reason: HOSPADM

## 2024-12-09 RX ORDER — SODIUM CHLORIDE 0.9 % (FLUSH) 0.9 %
10 SYRINGE (ML) INJECTION AS NEEDED
Status: DISCONTINUED | OUTPATIENT
Start: 2024-12-09 | End: 2024-12-11 | Stop reason: HOSPADM

## 2024-12-09 RX ORDER — LISINOPRIL 2.5 MG/1
5 TABLET ORAL DAILY
Status: DISCONTINUED | OUTPATIENT
Start: 2024-12-09 | End: 2024-12-11 | Stop reason: HOSPADM

## 2024-12-09 RX ORDER — METOPROLOL SUCCINATE 50 MG/1
50 TABLET, EXTENDED RELEASE ORAL DAILY
Status: CANCELLED | OUTPATIENT
Start: 2024-12-09

## 2024-12-09 RX ORDER — FLECAINIDE ACETATE 50 MG/1
100 TABLET ORAL 2 TIMES DAILY
Status: CANCELLED | OUTPATIENT
Start: 2024-12-09

## 2024-12-09 RX ORDER — ROSUVASTATIN CALCIUM 10 MG/1
10 TABLET, COATED ORAL DAILY
Status: DISCONTINUED | OUTPATIENT
Start: 2024-12-09 | End: 2024-12-11 | Stop reason: HOSPADM

## 2024-12-09 RX ORDER — SODIUM CHLORIDE 9 MG/ML
40 INJECTION, SOLUTION INTRAVENOUS AS NEEDED
Status: DISCONTINUED | OUTPATIENT
Start: 2024-12-09 | End: 2024-12-11 | Stop reason: HOSPADM

## 2024-12-09 RX ORDER — FLECAINIDE ACETATE 100 MG/1
100 TABLET ORAL 2 TIMES DAILY
COMMUNITY
End: 2024-12-11 | Stop reason: HOSPADM

## 2024-12-09 RX ORDER — FUROSEMIDE 20 MG/1
20 TABLET ORAL DAILY PRN
Status: DISCONTINUED | OUTPATIENT
Start: 2024-12-09 | End: 2024-12-11 | Stop reason: HOSPADM

## 2024-12-09 RX ORDER — ROSUVASTATIN CALCIUM 5 MG/1
10 TABLET, COATED ORAL DAILY
COMMUNITY

## 2024-12-09 RX ORDER — SODIUM CHLORIDE 0.9 % (FLUSH) 0.9 %
10 SYRINGE (ML) INJECTION EVERY 12 HOURS SCHEDULED
Status: DISCONTINUED | OUTPATIENT
Start: 2024-12-09 | End: 2024-12-11 | Stop reason: HOSPADM

## 2024-12-09 RX ORDER — MAGNESIUM SULFATE HEPTAHYDRATE 40 MG/ML
4 INJECTION, SOLUTION INTRAVENOUS ONCE
Status: COMPLETED | OUTPATIENT
Start: 2024-12-09 | End: 2024-12-09

## 2024-12-09 RX ORDER — SOTALOL HYDROCHLORIDE 80 MG/1
80 TABLET ORAL EVERY 12 HOURS SCHEDULED
Status: DISCONTINUED | OUTPATIENT
Start: 2024-12-09 | End: 2024-12-11 | Stop reason: HOSPADM

## 2024-12-09 RX ADMIN — Medication 10 ML: at 10:29

## 2024-12-09 RX ADMIN — APIXABAN 5 MG: 5 TABLET, FILM COATED ORAL at 20:36

## 2024-12-09 RX ADMIN — SOTALOL HYDROCHLORIDE 80 MG: 80 TABLET ORAL at 20:36

## 2024-12-09 RX ADMIN — LISINOPRIL 5 MG: 2.5 TABLET ORAL at 18:46

## 2024-12-09 RX ADMIN — ALLOPURINOL 100 MG: 100 TABLET ORAL at 18:47

## 2024-12-09 RX ADMIN — SOTALOL HYDROCHLORIDE 80 MG: 80 TABLET ORAL at 10:29

## 2024-12-09 RX ADMIN — Medication 10 ML: at 20:37

## 2024-12-09 RX ADMIN — ROSUVASTATIN 10 MG: 10 TABLET, FILM COATED ORAL at 18:46

## 2024-12-09 RX ADMIN — MAGNESIUM SULFATE HEPTAHYDRATE 4 G: 40 INJECTION, SOLUTION INTRAVENOUS at 11:29

## 2024-12-09 RX ADMIN — APIXABAN 5 MG: 5 TABLET, FILM COATED ORAL at 10:29

## 2024-12-09 NOTE — PLAN OF CARE
Goal Outcome Evaluation:    Pt is resting in bed with no s/s of distress noted. VSS. IV access maintained. Electrolytes replaced per protocol. Will continue with plan of care.

## 2024-12-09 NOTE — ED PROVIDER NOTES
Subjective   History of Present Illness    Review of Systems    Past Medical History:   Diagnosis Date   • Emphysema, unspecified    • Hypertension    • Restless leg syndrome        Allergies   Allergen Reactions   • Cozaar [Losartan] Hives   • Levaquin [Levofloxacin] Hives   • Penicillins Other (See Comments)     Unknown    • Clindamycin/Lincomycin Itching and Rash   • Sulfa Antibiotics Itching and Rash   • Vancomycin Itching and Rash       Past Surgical History:   Procedure Laterality Date   • CARDIOVASCULAR STRESS TEST  08/02/2017   • ECHO - CONVERTED  07/05/2017   • KNEE ARTHROSCOPY Right 2004   • SKIN CANCER EXCISION Left 12/2017       Family History   Problem Relation Age of Onset   • Heart attack Mother    • Heart disease Mother    • Diabetes Mother    • Kidney failure Mother    • Obesity Mother    • Kidney disease Father    • COPD Father    • Stroke Sister    • Hypertension Sister    • COPD Brother    • Heart disease Sister    • Heart attack Sister    • Heart failure Sister        Social History     Socioeconomic History   • Marital status: Unknown   Tobacco Use   • Smoking status: Never   • Smokeless tobacco: Never   Vaping Use   • Vaping status: Never Used   Substance and Sexual Activity   • Alcohol use: No     Comment:  occ  3-4 times a year   • Drug use: No   • Sexual activity: Defer           Objective   Physical Exam    Procedures           ED Course  ED Course as of 12/09/24 0833   Mon Dec 09, 2024   0721 EKG at 0715 shows atrial fibrillation, rate 93.  QRS duration 82, QTc 435 ms.  No apparent acute ischemia.  No evidence for STEMI. [CM]   0815 Spoke with Dr. Martinez, she wants patient admitted to the hospitalist service for sotalol loading, with her consulting.  Hospitalist paged. [CM]      ED Course User Index  [CM] Markus Denise MD                                                       [facility]    Final diagnoses:   None       ED Disposition  ED Disposition       None            No follow-up  provider specified.       Medication List      No changes were made to your prescriptions during this visit.          fibrillation, unspecified type       ED Disposition  ED Disposition       ED Disposition   Decision to Admit    Condition   --    Comment   Level of Care: Telemetry [5]   Diagnosis: A-fib [253061]   Admitting Physician: BRIANA ODOM [[id number]]                 Please note that portions of this note were completed with a voice recognition program.                Markus Denise MD  12/19/24 1382

## 2024-12-09 NOTE — H&P
[facility] Medicine Services  History & Physical    Patient Identification:  Name:  Coni Tubbs  Age:  59 y.o.  Sex:  female  :  1965  MRN:  [id number]   Visit Number:  [id number]  Admit Date: 2024   Primary Care Physician:  Donte Finney APRN    Subjective     Chief complaint: Atrial fibrillation    History of presenting illness:      Coni Tubbs is a 59 y.o. female who presented for further evaluation at the direction of cardiology.  Was recently seen in clinic secondary to her atrial fibrillation with cardiology recommending admission for sotalol loading.  She was seen and examined in the ED with family present at the bedside. She was pleasant and cooperative. She reported she feels well, at recent baseline. No new or worsening palpitations. No shortness of breath. Some leg swelling but no worse than usual. She is compliant with all home medications. She does not smoke.   Further review of systems obtained and otherwise negative, see below.   She has not missed any doses of anticoagulation. She dose have SUSAN- noncompliant w/ CPAP as she cannot tolerate it.     Past medical history is significant for A-fib, diabetes mellitus, hyperlipidemia, SUSAN    Upon arrival to the ED, vital signs were temp 98.1, heart rate 92, respirations 17, /76, SpO2 97% on RA.  Reported workup largely unremarkable.  UA was abnormal-cloudy with trace blood, moderate leukocytes, many WBCs and 1+ bacteria though did note 7-12 squamous epithelial cells.  CXR was negative, EKG showed rate controlled atrial fibrillation with QT interval 350.    Known Emergency Department medications received prior to my evaluation included none.   Emergency Department Room location at the time of my evaluation was 108.     ---------------------------------------------------------------------------------------------------------------------   Review of Systems   Constitutional:  Negative for chills and  fever.   HENT:  Negative for congestion and rhinorrhea.    Respiratory:  Negative for cough and shortness of breath.    Cardiovascular:  Negative for chest pain, palpitations and leg swelling.   Gastrointestinal:  Negative for abdominal pain, diarrhea, nausea and vomiting.   Genitourinary:  Negative for difficulty urinating and dysuria.   Musculoskeletal:  Negative for arthralgias and myalgias.   Skin:  Negative for rash and wound.   Neurological:  Negative for dizziness and light-headedness.        ---------------------------------------------------------------------------------------------------------------------   Past Medical History:   Diagnosis Date    Emphysema, unspecified     Hypertension     Restless leg syndrome      Past Surgical History:   Procedure Laterality Date    CARDIOVASCULAR STRESS TEST  08/02/2017    ECHO - CONVERTED  07/05/2017    KNEE ARTHROSCOPY Right 2004    SKIN CANCER EXCISION Left 12/2017     Family History   Problem Relation Age of Onset    Heart attack Mother     Heart disease Mother     Diabetes Mother     Kidney failure Mother     Obesity Mother     Kidney disease Father     COPD Father     Stroke Sister     Hypertension Sister     COPD Brother     Heart disease Sister     Heart attack Sister     Heart failure Sister      Social History     Socioeconomic History    Marital status: Unknown   Tobacco Use    Smoking status: Never    Smokeless tobacco: Never   Vaping Use    Vaping status: Never Used   Substance and Sexual Activity    Alcohol use: No     Comment:  occ  3-4 times a year    Drug use: No    Sexual activity: Defer     ---------------------------------------------------------------------------------------------------------------------   Allergies:  Cozaar [losartan], Levaquin [levofloxacin], Penicillins, Clindamycin/lincomycin, Sulfa antibiotics, and  Vancomycin  ---------------------------------------------------------------------------------------------------------------------   Home medications:    Medications below are reported home medications pulling from within the system; at this time, these medications have not been reconciled unless otherwise specified and are in the verification process for further verifcation as current home medications.  (Not in a hospital admission)      Hospital Scheduled Meds:          Current listed hospital scheduled medications may not yet reflect those currently placed in orders that are signed and held awaiting patient's arrival to floor.   ---------------------------------------------------------------------------------------------------------------------     Objective     Vital Signs:  Temp:  [98.1 °F (36.7 °C)] 98.1 °F (36.7 °C)  Heart Rate:  [86-92] 86  Resp:  [17] 17  BP: ()/(71-76) 91/71      12/09/24  0711   Weight: 127 kg (279 lb)     Body mass index is 37.84 kg/m².  ---------------------------------------------------------------------------------------------------------------------       Physical Exam  Vitals and nursing note reviewed.   Constitutional:       General: She is not in acute distress.  HENT:      Head: Normocephalic and atraumatic.   Eyes:      Extraocular Movements: Extraocular movements intact.   Cardiovascular:      Rate and Rhythm: Normal rate. Rhythm irregular.   Pulmonary:      Effort: Pulmonary effort is normal.      Breath sounds: Normal breath sounds.   Abdominal:      Palpations: Abdomen is soft.      Tenderness: There is no abdominal tenderness.   Musculoskeletal:      Right lower leg: Edema present.      Left lower leg: Edema present.   Skin:     General: Skin is warm and dry.   Neurological:      Mental Status: She is alert. Mental status is at baseline.   Psychiatric:         Mood and Affect: Mood normal.         Behavior: Behavior normal.  "            ---------------------------------------------------------------------------------------------------------------------  EKG:        I have personally looked at the EKG.  ---------------------------------------------------------------------------------------------------------------------   Results from last 7 days   Lab Units 12/09/24  0728   WBC 10*3/mm3 8.81   HEMOGLOBIN g/dL 14.7   HEMATOCRIT % 42.8   MCV fL 99.1*   MCHC g/dL 34.3   PLATELETS 10*3/mm3 180         Results from last 7 days   Lab Units 12/09/24  0728   SODIUM mmol/L 137   POTASSIUM mmol/L 4.0   MAGNESIUM mg/dL 1.6   CHLORIDE mmol/L 105   CO2 mmol/L 21.3*   BUN mg/dL 18   CREATININE mg/dL 0.82   CALCIUM mg/dL 9.4   GLUCOSE mg/dL 252*   ALBUMIN g/dL 3.7   BILIRUBIN mg/dL 0.6   ALK PHOS U/L 67   AST (SGOT) U/L 14   ALT (SGPT) U/L 12   Estimated Creatinine Clearance: 109.1 mL/min (by C-G formula based on SCr of 0.82 mg/dL).  No results found for: \"AMMONIA\"  Results from last 7 days   Lab Units 12/09/24  0728   HSTROP T ng/L 10     Results from last 7 days   Lab Units 12/09/24  0728   PROBNP pg/mL 741.8     No results found for: \"HGBA1C\"  Lab Results   Component Value Date    TSH 2.240 12/09/2024    FREET4 0.86 (L) 08/01/2017     No results found for: \"PREGTESTUR\", \"PREGSERUM\", \"HCG\", \"HCGQUANT\"  Pain Management Panel           No data to display              No results found for: \"BLOODCX\"  No results found for: \"URINECX\"  No results found for: \"WOUNDCX\"  No results found for: \"STOOLCX\"      ---------------------------------------------------------------------------------------------------------------------  Imaging Results (Last 7 Days)       Procedure Component Value Units Date/Time    XR Chest 1 View [[id number]] Collected: 12/09/24 0823     Updated: 12/09/24 0826    Narrative:      EXAM:    XR Chest, 1 View     EXAM DATE:    12/9/2024 7:27 AM     CLINICAL HISTORY:    Palpitations     TECHNIQUE:    Frontal view of the chest.   " "  COMPARISON:    6/14/2017     FINDINGS:    LUNGS AND PLEURAL SPACES:  Unremarkable as visualized.  No  consolidation.  No pneumothorax.    HEART:  Unremarkable as visualized.  No cardiomegaly.    MEDIASTINUM:  Unremarkable as visualized.  Normal mediastinal contour.    BONES/JOINTS:  Unremarkable as visualized.  No acute fracture.       Impression:        Unremarkable exam. No acute cardiopulmonary findings identified.     This report was finalized on 12/9/2024 8:24 AM by Dr. Sebastian Cantu MD.               Cultures:  No results found for: \"BLOODCX\", \"URINECX\", \"WOUNDCX\", \"MRSACX\", \"RESPCX\", \"STOOLCX\"    Last echocardiogram:  Results for orders placed during the hospital encounter of 09/20/22    Adult Transthoracic Echo Complete W/ Cont if Necessary Per Protocol    Interpretation Summary  · Technically difficult echo and Doppler study was obtained.  · Normal left ventricular cavity size noted.  · Left ventricular wall thickness is consistent with mild concentric hypertrophy.  · Left ventricular ejection fraction appears to be 61 - 65%. Left ventricular systolic function is normal.  · Left ventricular diastolic function is consistent with (grade II w/high LAP) pseudonormalization.  · Aortic valve is normal, no aortic stenosis or regurgitation noted.  · Mitral valve appears normal, no mitral stenosis or regurgitation noted.  · Mild tricuspid regurgitation with mild pulmonary hypertension noted.  · No pericardial effusion detected.          I have personally reviewed the above radiology images and read the final radiology report on 12/09/24  ---------------------------------------------------------------------------------------------------------------------  Assessment / Plan     Active Hospital Problems    Diagnosis  POA    **A-fib [I48.91]  Yes       ASSESSMENT/PLAN:    Atrial fibrillation  Patient is being admitted for sotalol loading.   Will continue eliquis  Consult cardiology, assistance appreciated.   Start " Sotalol 80 mg BID. Plan for EKG monitoring after each dose per cardiology recommendation.   Will monitor electrolytes and renal function closely. Will also check folate, B12  If patient does not convert may require cardioversion during this admission  Cont telemetry monitoring.     DM II  HLD  SUSAN  Cont home meds as indicated  Will cover with SSI if needed while inpatient  Encourage compliance w/ CPAP  ----------  -DVT prophylaxis: chronically anticoagulated  -Activity: ad tiki  -Expected length of stay: OBSERVATION status; however, if further evaluation or treatment plans warrant, status may change.  Based upon current information, I predict patient's care encounter to be less than or equal to 2 midnights   -Disposition pending further clinical course and cardiology recommendations.     High risk secondary to atrial fibrillation, sotalol loading    Code Status and Medical Interventions: CPR (Attempt to Resuscitate); Full Support   Ordered at: 12/09/24 0831     Code Status (Patient has no pulse and is not breathing):    CPR (Attempt to Resuscitate)     Medical Interventions (Patient has pulse or is breathing):    Full Support       Benny Pang PA-C   12/09/24  09:16 EST

## 2024-12-09 NOTE — CONSULTS
[facility]  CONSULT  NOTE      Patient information:  Date of Admit: 12/9/2024  Date of Consult: 12/09/24  Hospitalist/Referring MD:Juani George DO;   PCP: Donte Finney APRN  MRN:  [id number]  Visit Number:  [id number]    LOS: 0  CODE STATUS:  Code Status and Medical Interventions: CPR (Attempt to Resuscitate); Full Support   Ordered at: 12/09/24 0831     Code Status (Patient has no pulse and is not breathing):    CPR (Attempt to Resuscitate)     Medical Interventions (Patient has pulse or is breathing):    Full Support       PROBLEM LIST: Principal Problem:    A-fib      Consults  09:47 EST  12/9/2024    Reason for Cardiology consultation: Sotalol loading     General Cardiology Consulting Physician: Dr. Gisela MD    Primary Cardiologist: Dr. Martinez    Assessment    Atrial fibrillation.  [id number] is 3 for hypertension, diabetes mellitus and gender.  Hypertension  Diabetes mellitus type 2        Planning   Confirmed with the patient she reports that she has been compliant with her Eliquis 5 mg p.o. twice daily  And did not miss any doses for last 4 doses  Sotalol initiated using the sotalol order set.  If QTc interval prolongs to >500 msec or increases >=20% from baseline, discontinue sotalol.  On sotalol 80 mg p.o. twice daily.  Get an EKG 2 to 4 hours after each dose of sotalol for first 5 doses.  Tentative plan for cardioversion on 12/11/2024 in case patient continues to be in atrial fibrillation.           Subjective Data     12/9/2024    ADMISSION INFORMATION:  Chief Complaint   Patient presents with    Atrial Fibrillation     History of Present Illness (HPI)  HPI obtained from chart review     Coni Tubbs is a 59 y.o. female with a past medical history significant for atrial fibrillation [id number] on Eliquis, hypertension, hyperlipidemia, DMT2 and SUSAN.     Patient presented to [facility] ([facility] emergency department (ED) on  12/9/2024 for sotalol loading.     Cardiology has been consulted for further evaluation and management for sotalol loading  .     Primary Cardiologist has been Dr. Martinez and she was last seen in the office on 12/05/2024. Patient has tried Flecainide in the past. Her flecainide dose was increased and she continued to maintain sinus rhythm until 7/23/2024 when she went back into A-fib. She was in flecainide 100 Mg twice daily which was eventually discontinued on 11/7/2024 with plans to be started on sotalol. It was discussed the need for inpatient admission and close monitoring for at least 5 doses of sotalol with regular EKG after each dose and daily BMP and magnesium.  Will plan for DCCV if patient fails to convert to sinus rhythm with sotalol during that admission.  Patient voiced understanding.  Patient would like to come to the hospital Monday [12/9] morning for admission and will tentatively plan for DCCV for persistent A-fib on Wednesday.    She reported medical compliancy with anticoagulation and has not missed any doses.     EKG showed rate controlled atrial fibrillation with QT interval 350.  Magnesium is 1.6 but has been supplemented per patient's MAR.    Patient was in room 316 A when she was seen and examined by Dr. Higgins.  Patient is lying in bed resting quietly.  No acute distress noted at this time.  Patient has no acute complaints.  She reports she is already had her first dose of sotalol earlier this morning.  Several family members are at bedside.    EVENT TIMELINE:  12/9: Sotalol started with AM dose today.    Personal History     Cardiac risk factors:diabetes mellitus, hypercholesterolemia, hypertension, and Obesity      Last Echo: Results for orders placed during the hospital encounter of 09/20/22    Adult Transthoracic Echo Complete W/ Cont if Necessary Per Protocol    Interpretation Summary  · Technically difficult echo and Doppler study was obtained.  · Normal left ventricular cavity size  noted.  · Left ventricular wall thickness is consistent with mild concentric hypertrophy.  · Left ventricular ejection fraction appears to be 61 - 65%. Left ventricular systolic function is normal.  · Left ventricular diastolic function is consistent with (grade II w/high LAP) pseudonormalization.  · Aortic valve is normal, no aortic stenosis or regurgitation noted.  · Mitral valve appears normal, no mitral stenosis or regurgitation noted.  · Mild tricuspid regurgitation with mild pulmonary hypertension noted.  · No pericardial effusion detected.         Last Stress: Results for orders placed during the hospital encounter of 08/01/17    Stress Test With Myocardial Perfusion One Day    Interpretation Summary  · Breast attenuation and GI artifacts are present.  · Left ventricular ejection fraction is hyperdynamic (Calculated EF > 70%).  · Myocardial perfusion imaging indicates a normal myocardial perfusion study with no evidence of ischemia.  · Impressions are consistent with a low risk study.  · Findings consistent with a normal ECG stress test.  · Fixed ant wall defect with good wall motion, probably representing breast attenuation.      Last Cath:  None found on chart review.                         Past Medical History:   Diagnosis Date    Cancer     Diabetes mellitus     Elevated cholesterol     Emphysema, unspecified     Hypertension     Restless leg syndrome     Sleep apnea      Past Surgical History:   Procedure Laterality Date    CARDIOVASCULAR STRESS TEST  08/02/2017    ECHO - CONVERTED  07/05/2017    EYE SURGERY      KNEE ARTHROSCOPY Right 2004    SKIN BIOPSY      SKIN CANCER EXCISION Left 12/2017     Family History   Problem Relation Age of Onset    Heart attack Mother     Heart disease Mother     Diabetes Mother     Kidney failure Mother     Obesity Mother     Kidney disease Father     COPD Father     Stroke Sister     Hypertension Sister     COPD Brother     Heart disease Sister     Heart attack Sister      Heart failure Sister      Social History     Tobacco Use    Smoking status: Never     Passive exposure: Never    Smokeless tobacco: Never   Vaping Use    Vaping status: Never Used   Substance Use Topics    Alcohol use: No     Comment:  occ  3-4 times a year    Drug use: No     ALLERGIES: Cozaar [losartan], Levaquin [levofloxacin], Penicillins, Clindamycin/lincomycin, Sulfa antibiotics, and Vancomycin    Medications listed below are reported home medications pulling from within the system:  Medications Prior to Admission   Medication Sig Dispense Refill Last Dose/Taking    allopurinol (ZYLOPRIM) 100 MG tablet Take 1 tablet by mouth Daily.  2 12/8/2024    apixaban (ELIQUIS) 5 MG tablet tablet Take 1 tablet by mouth Every 12 (Twelve) Hours.   12/8/2024    flecainide (TAMBOCOR) 100 MG tablet Take 1 tablet by mouth 2 (Two) Times a Day.   12/8/2024    lisinopril (PRINIVIL,ZESTRIL) 5 MG tablet Take 1 tablet by mouth Daily. 90 tablet 1 12/8/2024    metoprolol succinate XL (TOPROL-XL) 25 MG 24 hr tablet TAKE TWO TABLETS BY MOUTH EVERY  tablet 1 12/8/2024    rosuvastatin (CRESTOR) 5 MG tablet Take 2 tablets by mouth Daily.   12/8/2024    furosemide (LASIX) 20 MG tablet Take 1 tablet by mouth Daily As Needed (swelling).   Unknown    Ozempic, 0.25 or 0.5 MG/DOSE, 2 MG/3ML solution pen-injector Inject 0.25 mg under the skin into the appropriate area as directed 1 (One) Time Per Week.   12/6/2024    rosuvastatin (CRESTOR) 10 MG tablet Take 1 tablet by mouth Daily. 90 tablet 1 Unknown       Review of Systems   Constitutional:  Negative for activity change, appetite change and diaphoresis.   HENT:  Negative for facial swelling and trouble swallowing.    Eyes:  Negative for visual disturbance.   Respiratory:  Negative for shortness of breath.    Cardiovascular:  Negative for chest pain, palpitations and leg swelling.   Gastrointestinal:  Negative for blood in stool, nausea and vomiting.   Endocrine: Negative.     Genitourinary:  Negative for hematuria.   Musculoskeletal:  Negative for gait problem.   Skin:  Negative for color change.   Neurological:  Negative for dizziness, syncope, speech difficulty, weakness, light-headedness and headaches.   Psychiatric/Behavioral:  Negative for agitation and behavioral problems.      Objective Data   Vital Signs  Temp:  [98.1 °F (36.7 °C)] 98.1 °F (36.7 °C)  Heart Rate:  [63-92] 63  Resp:  [17-18] 18  BP: ()/(59-77) 105/59  Device (Oxygen Therapy): room air  Vital Signs (last 72 hrs)         12/06 0700  12/07 0659 12/07 0700 12/08 0659 12/08 0700 12/09 0659 12/09 0700 12/09 0947   Most Recent      Temp (°F)         98.1     98.1 (36.7) 12/09 0711    Heart Rate       63 -  92     63 12/09 0930    Resp         17     17 12/09 0711    BP       91/71 -  108/76     106/77 12/09 0930    SpO2 (%)       96 -  97     96 12/09 0930          BMI:   Body mass index is 38.72 kg/m².  WEIGHT:  Wt Readings from Last 3 Encounters:   12/09/24 129 kg (285 lb 7.9 oz)   12/05/24 127 kg (279 lb)   11/21/24 128 kg (282 lb)     DIET:  Diet: Cardiac; Healthy Heart (2-3 Na+); Fluid Consistency: Thin (IDDSI 0)  I&O:  Intake & Output (last 3 days)       None          Physical Exam  Constitutional:       Appearance: Normal appearance.   HENT:      Head: Normocephalic and atraumatic.      Nose: Nose normal.      Mouth/Throat:      Mouth: Mucous membranes are moist.      Pharynx: Oropharynx is clear.   Eyes:      Conjunctiva/sclera: Conjunctivae normal.   Cardiovascular:      Rate and Rhythm: Normal rate. Rhythm irregular.      Pulses: Normal pulses.      Heart sounds: Normal heart sounds.   Pulmonary:      Effort: Pulmonary effort is normal.      Breath sounds: Normal breath sounds.   Abdominal:      General: Bowel sounds are normal.      Palpations: Abdomen is soft.   Musculoskeletal:         General: Normal range of motion.      Cervical back: Normal range of motion.   Skin:     General: Skin is warm and  "dry.   Neurological:      General: No focal deficit present.      Mental Status: She is alert and oriented to person, place, and time.   Psychiatric:         Mood and Affect: Mood normal.         Behavior: Behavior normal.         Results review   Results Review:    I have reviewed the patient's new clinical results. 24 12:25 EST    Results from last 7 days   Lab Units 2422 2428   HSTROP T ng/L 9 10     Lab Results   Component Value Date    PROBNP 741.8 2024     Results from last 7 days   Lab Units 24  0728   WBC 10*3/mm3 8.81   HEMOGLOBIN g/dL 14.7   PLATELETS 10*3/mm3 180     Results from last 7 days   Lab Units 2428   SODIUM mmol/L 137   POTASSIUM mmol/L 4.0   CHLORIDE mmol/L 105   CO2 mmol/L 21.3*   BUN mg/dL 18   CREATININE mg/dL 0.82   CALCIUM mg/dL 9.4   GLUCOSE mg/dL 252*   ALT (SGPT) U/L 12   AST (SGOT) U/L 14     Lab Results   Component Value Date    MG 1.6 2024     Lab Results   Component Value Date    CHOL 151 2017    TRIG 154 (H) 2017    HDL 29 (L) 2017    LDL 91 2017     Estimated Creatinine Clearance: 110.5 mL/min (by C-G formula based on SCr of 0.82 mg/dL).  No results found for: \"HGBA1C\"  No results found for: \"INR\"  No results found for: \"LABHEPA\"  No components found for: \"DIG\"  Lab Results   Component Value Date    TSH 2.240 2024      No results found for: \"URICACID\"  Pain Management Panel           No data to display              Microbiology Results (last 10 days)       ** No results found for the last 240 hours. **           No results found for: \"BLOODCX\"      QTc Interval:         EC2024        ECG/EMG Results (last 24 hours)       Procedure Component Value Units Date/Time    ECG 12 Lead Rhythm Change [[id number]] Collected: 24     Updated: 24     QT Interval 350 ms      QTC Interval 435 ms     Narrative:      Test Reason : Rhythm Change  Blood Pressure :   */* "   mmHG  Vent. Rate :  93 BPM     Atrial Rate : 147 BPM     P-R Int :   * ms          QRS Dur :  82 ms      QT Int : 350 ms       P-R-T Axes :   * -14  31 degrees    QTcB Int : 435 ms    Atrial fibrillation  Low voltage QRS  Cannot rule out Anterior infarct , age undetermined  Abnormal ECG  When compared with ECG of 03-Jul-2017 09:44,  Atrial fibrillation has replaced Sinus rhythm  Minimal criteria for Anterior infarct are now present  Nonspecific T wave abnormality now evident in Anterolateral leads    Referred By: PAU           Confirmed By:             TELEMETRY:                   RADIOLOGY STUDIES:  Imaging Results (Last 72 Hours)       Procedure Component Value Units Date/Time    XR Chest 1 View [[id number]] Collected: 12/09/24 0823     Updated: 12/09/24 0826    Narrative:      EXAM:    XR Chest, 1 View     EXAM DATE:    12/9/2024 7:27 AM     CLINICAL HISTORY:    Palpitations     TECHNIQUE:    Frontal view of the chest.     COMPARISON:    6/14/2017     FINDINGS:    LUNGS AND PLEURAL SPACES:  Unremarkable as visualized.  No  consolidation.  No pneumothorax.    HEART:  Unremarkable as visualized.  No cardiomegaly.    MEDIASTINUM:  Unremarkable as visualized.  Normal mediastinal contour.    BONES/JOINTS:  Unremarkable as visualized.  No acute fracture.       Impression:        Unremarkable exam. No acute cardiopulmonary findings identified.     This report was finalized on 12/9/2024 8:24 AM by Dr. Sebastian Cantu MD.               ALLERGIES: Cozaar [losartan], Levaquin [levofloxacin], Penicillins, Clindamycin/lincomycin, Sulfa antibiotics, and Vancomycin    CURRENT MEDICATIONS:  Current list of medications may not reflect those currently placed in orders that are not signed or are being held.     apixaban, 5 mg, Oral, Q12H  magnesium sulfate, 4 g, Intravenous, Once  sodium chloride, 10 mL, Intravenous, Q12H  sotalol, 80 mg, Oral, Q12H           Calcium Replacement - Follow Nurse / BPA Driven Protocol    Magnesium  Cardiology Dose Replacement - Follow Nurse / BPA Driven Protocol    nitroglycerin    Pharmacy Consult    Phosphorus Replacement - Follow Nurse / BPA Driven Protocol    Potassium Replacement - Follow Nurse / BPA Driven Protocol    [COMPLETED] Insert Peripheral IV **AND** sodium chloride    sodium chloride    sodium chloride        Thank you very much for asking us to be involved in this patient's care. Please do not hesitate to call for any questions or concerns.     I have discussed the patients findings and recommendations with the patient.    Electronically signed by LOLIS Ruffin, 12/09/24, 12:36 PM EST.                       Please note that portions of this note were copied and has been reviewed and is accurate as of 12/9/2024 .      Please note that portions of this note were completed with a voice recognition program.

## 2024-12-10 LAB
ANION GAP SERPL CALCULATED.3IONS-SCNC: 7 MMOL/L (ref 5–15)
BUN SERPL-MCNC: 15 MG/DL (ref 8–23)
BUN/CREAT SERPL: 14.7 (ref 7–25)
CALCIUM SPEC-SCNC: 8.6 MG/DL (ref 8.6–10.5)
CHLORIDE SERPL-SCNC: 106 MMOL/L (ref 98–107)
CO2 SERPL-SCNC: 24 MMOL/L (ref 22–29)
CREAT SERPL-MCNC: 1.02 MG/DL (ref 0.57–1)
EGFRCR SERPLBLD CKD-EPI 2021: 63.1 ML/MIN/1.73
FOLATE SERPL-MCNC: 6.57 NG/ML (ref 4.78–24.2)
GLUCOSE BLDC GLUCOMTR-MCNC: 169 MG/DL (ref 70–130)
GLUCOSE BLDC GLUCOMTR-MCNC: 221 MG/DL (ref 70–130)
GLUCOSE SERPL-MCNC: 186 MG/DL (ref 65–99)
MAGNESIUM SERPL-MCNC: 2 MG/DL (ref 1.6–2.4)
POTASSIUM SERPL-SCNC: 4.2 MMOL/L (ref 3.5–5.2)
QT INTERVAL: 386 MS
QT INTERVAL: 396 MS
QT INTERVAL: 398 MS
QTC INTERVAL: 434 MS
QTC INTERVAL: 445 MS
QTC INTERVAL: 450 MS
SODIUM SERPL-SCNC: 137 MMOL/L (ref 136–145)
VIT B12 BLD-MCNC: 320 PG/ML (ref 211–946)

## 2024-12-10 PROCEDURE — 63710000001 INSULIN LISPRO (HUMAN) PER 5 UNITS: Performed by: INTERNAL MEDICINE

## 2024-12-10 PROCEDURE — 80048 BASIC METABOLIC PNL TOTAL CA: CPT | Performed by: INTERNAL MEDICINE

## 2024-12-10 PROCEDURE — 99233 SBSQ HOSP IP/OBS HIGH 50: CPT | Performed by: INTERNAL MEDICINE

## 2024-12-10 PROCEDURE — 83735 ASSAY OF MAGNESIUM: CPT | Performed by: INTERNAL MEDICINE

## 2024-12-10 PROCEDURE — 82746 ASSAY OF FOLIC ACID SERUM: CPT | Performed by: INTERNAL MEDICINE

## 2024-12-10 PROCEDURE — 93005 ELECTROCARDIOGRAM TRACING: CPT | Performed by: INTERNAL MEDICINE

## 2024-12-10 PROCEDURE — 93005 ELECTROCARDIOGRAM TRACING: CPT

## 2024-12-10 PROCEDURE — 25810000003 SODIUM CHLORIDE 0.9 % SOLUTION: Performed by: INTERNAL MEDICINE

## 2024-12-10 PROCEDURE — 82948 REAGENT STRIP/BLOOD GLUCOSE: CPT

## 2024-12-10 PROCEDURE — 82607 VITAMIN B-12: CPT | Performed by: INTERNAL MEDICINE

## 2024-12-10 RX ORDER — SODIUM CHLORIDE 9 MG/ML
100 INJECTION, SOLUTION INTRAVENOUS CONTINUOUS
Status: ACTIVE | OUTPATIENT
Start: 2024-12-11 | End: 2024-12-11

## 2024-12-10 RX ORDER — NICOTINE POLACRILEX 4 MG
15 LOZENGE BUCCAL
Status: DISCONTINUED | OUTPATIENT
Start: 2024-12-10 | End: 2024-12-11 | Stop reason: HOSPADM

## 2024-12-10 RX ORDER — DEXTROSE MONOHYDRATE 25 G/50ML
25 INJECTION, SOLUTION INTRAVENOUS
Status: DISCONTINUED | OUTPATIENT
Start: 2024-12-10 | End: 2024-12-11 | Stop reason: HOSPADM

## 2024-12-10 RX ORDER — INSULIN LISPRO 100 [IU]/ML
2-7 INJECTION, SOLUTION INTRAVENOUS; SUBCUTANEOUS
Status: DISCONTINUED | OUTPATIENT
Start: 2024-12-10 | End: 2024-12-11 | Stop reason: HOSPADM

## 2024-12-10 RX ORDER — GLUCAGON 1 MG/ML
1 KIT INJECTION
Status: DISCONTINUED | OUTPATIENT
Start: 2024-12-10 | End: 2024-12-11 | Stop reason: HOSPADM

## 2024-12-10 RX ADMIN — ALLOPURINOL 100 MG: 100 TABLET ORAL at 08:14

## 2024-12-10 RX ADMIN — ROSUVASTATIN 10 MG: 10 TABLET, FILM COATED ORAL at 08:14

## 2024-12-10 RX ADMIN — APIXABAN 5 MG: 5 TABLET, FILM COATED ORAL at 20:45

## 2024-12-10 RX ADMIN — APIXABAN 5 MG: 5 TABLET, FILM COATED ORAL at 08:13

## 2024-12-10 RX ADMIN — Medication 10 ML: at 08:16

## 2024-12-10 RX ADMIN — INSULIN LISPRO 3 UNITS: 100 INJECTION, SOLUTION INTRAVENOUS; SUBCUTANEOUS at 20:45

## 2024-12-10 RX ADMIN — SOTALOL HYDROCHLORIDE 80 MG: 80 TABLET ORAL at 08:14

## 2024-12-10 RX ADMIN — Medication 10 ML: at 20:46

## 2024-12-10 RX ADMIN — SOTALOL HYDROCHLORIDE 80 MG: 80 TABLET ORAL at 20:45

## 2024-12-10 RX ADMIN — INSULIN LISPRO 2 UNITS: 100 INJECTION, SOLUTION INTRAVENOUS; SUBCUTANEOUS at 17:21

## 2024-12-10 RX ADMIN — SODIUM CHLORIDE 100 ML/HR: 9 INJECTION, SOLUTION INTRAVENOUS at 23:36

## 2024-12-10 NOTE — PROGRESS NOTES
"    [facility]  PROGRESS NOTE    Patient information:  Name: Coni Tubbs  Age/Sex: 59 y.o. female  :  1965        PCP: Donte Finney APRN  Attending: Juani George DO  MRN:  [id number]  Visit Number:  [id number]  LOS: 0  CODE STATUS:    Code Status and Medical Interventions: CPR (Attempt to Resuscitate); Full Support   Ordered at: 24 0831     Code Status (Patient has no pulse and is not breathing):    CPR (Attempt to Resuscitate)     Medical Interventions (Patient has pulse or is breathing):    Full Support     PROBLEM LIST:Principal Problem:    A-fib    Reason for Cardiology follow-up: Sotalol loading     Subjective   ADMISSION INFORMATION:  Chief Complaint   Patient presents with    Atrial Fibrillation     DATE:12/10/2024    HPI:  Coni Tubbs is a 59 y.o. female with a past medical history significant for atrial fibrillation [id number] 3 on Eliquis, hypertension, hyperlipidemia, DMT2 and SUSAN.      Patient presented to [facility] ([facility] [place]) emergency department (ED) on 2024 for sotalol loading.     Primary cardiologist is Dr. Martinez and she was last seen in the office on 2024.    Interval History:   Telemetry reveals atrial fibrillation 70s.  AM EKG reveals a QTc interval of 450 ms and a QRS duration of 78 ms.  AM labs reveal potassium 4.2, creatinine 1.02, and magnesium is 2.0.     Patient was in room 316 A when she was seen and examined.  Patient is lying in bed resting quietly.  No acute distress noted at this time.  Patient reports she was tested for SUSAN little bit over a year ago and does have a CPAP at home that she is not compliant with.  Patient reports, \"I do not rest well when using it\".  Encourage patient to be compliant with CPAP.  Discussed with patient cardioversion and patient has agreed to proceed with this for Wednesday, 2024.    ORDERS:   Planning DCCV on Wednesday, 2024.  Patient reported " compliancy with Eliquis in the last 4 weeks.  N.p.o. after midnight on 12/10/2024, Tuesday    EVENT TIMELINE:   12/9: Sotalol started with AM dose today.  Fifth dose is calculated to be administered on Wednesday morning 12/11/2024.  12/10: Planning DCCV on Wed 12/11.      Objective     Vital Signs  Temp:  [97.5 °F (36.4 °C)-98.5 °F (36.9 °C)] 97.8 °F (36.6 °C)  Heart Rate:  [63-86] 81  Resp:  [18-20] 18  BP: ()/(59-88) 101/63  Device (Oxygen Therapy): room air  Vital Signs (last 72 hrs)         12/07 0700  12/08 0659 12/08 0700  12/09 0659 12/09 0700  12/10 0659 12/10 0700  12/10 0806   Most Recent      Temp (°F)     97.5 -  98.5       97.8 (36.6) 12/10 0644    Heart Rate     63 -  92       81 12/10 0644    Resp     17 -  20       18 12/10 0644    BP     91/71 -  138/88       101/63 12/10 0644    SpO2 (%)     96 -  97       96 12/10 0644          BMI:Body mass index is 39.2 kg/m².    WEIGHT:      12/09/24  0711 12/09/24  1006 12/10/24  0500   Weight: 127 kg (279 lb) 129 kg (285 lb 7.9 oz) 131 kg (289 lb 0.4 oz)       DIET:Diet: Cardiac; Healthy Heart (2-3 Na+); Fluid Consistency: Thin (IDDSI 0)    I&O:  Intake & Output (last 3 days)         12/07 0701  12/08 0700 12/08 0701  12/09 0700 12/09 0701  12/10 0700 12/10 0701  12/11 0700    P.O.   360     I.V. (mL/kg)   100.3 (0.8)     Total Intake(mL/kg)   460.3 (3.5)     Net   +460.3             Urine Unmeasured Occurrence   4 x              PHYSICAL EXAM:  Vitals and nursing note reviewed.   Constitutional:       Appearance: Not in distress.   Neck:      Vascular: No JVR. JVD normal.   Pulmonary:      Effort: Pulmonary effort is normal.      Breath sounds: Normal breath sounds. No wheezing. No rhonchi. No rales.   Cardiovascular:      Normal rate. Irregular rhythm. Normal S1. Normal S2.       Murmurs: There is no murmur.      No gallop.  No click. No rub.   Pulses:     Intact distal pulses.   Edema:     Peripheral edema absent.   Musculoskeletal:      Cervical  "back: Normal range of motion and neck supple. Skin:     General: Skin is warm and dry.   Neurological:      General: No focal deficit present.      Mental Status: Alert and oriented to person, place and time.   Psychiatric:         Behavior: Behavior is cooperative.                  Results review   Results Review:    I have reviewed the patient's new clinical results. 12/10/24 08:06 EST    Results from last 7 days   Lab Units 12/09/24  0922 12/09/24  0728   HSTROP T ng/L 9 10     Lab Results   Component Value Date    PROBNP 741.8 12/09/2024     Results from last 7 days   Lab Units 12/09/24  0728   WBC 10*3/mm3 8.81   HEMOGLOBIN g/dL 14.7   PLATELETS 10*3/mm3 180     Results from last 7 days   Lab Units 12/10/24  0047 12/09/24 0728   SODIUM mmol/L 137 137   POTASSIUM mmol/L 4.2 4.0   CHLORIDE mmol/L 106 105   CO2 mmol/L 24.0 21.3*   BUN mg/dL 15 18   CREATININE mg/dL 1.02* 0.82   CALCIUM mg/dL 8.6 9.4   GLUCOSE mg/dL 186* 252*   ALT (SGPT) U/L  --  12   AST (SGOT) U/L  --  14     Lab Results   Component Value Date    MG 2.0 12/10/2024    MG 1.6 12/09/2024     Estimated Creatinine Clearance: 89.2 mL/min (A) (by C-G formula based on SCr of 1.02 mg/dL (H)).    No results found for: \"HGBA1C\"  Lab Results   Component Value Date    CHOL 151 08/01/2017    TRIG 154 (H) 08/01/2017    LDL 91 08/01/2017    HDL 29 (L) 08/01/2017     Lab Results   Component Value Date    ABSOLUTELUNG 34 12/09/2024     No results found for: \"INR\"  No results found for: \"LABHEPA\"    Lab Results   Component Value Date    TSH 2.240 12/09/2024      Pain Management Panel           No data to display              Microbiology Results (last 10 days)       ** No results found for the last 240 hours. **           Imaging Results (Last 24 Hours)       Procedure Component Value Units Date/Time    XR Chest 1 View [[id number]] Collected: 12/09/24 0823     Updated: 12/09/24 0826    Narrative:      EXAM:    XR Chest, 1 View     EXAM DATE:    12/9/2024 7:27 AM   "   CLINICAL HISTORY:    Palpitations     TECHNIQUE:    Frontal view of the chest.     COMPARISON:    6/14/2017     FINDINGS:    LUNGS AND PLEURAL SPACES:  Unremarkable as visualized.  No  consolidation.  No pneumothorax.    HEART:  Unremarkable as visualized.  No cardiomegaly.    MEDIASTINUM:  Unremarkable as visualized.  Normal mediastinal contour.    BONES/JOINTS:  Unremarkable as visualized.  No acute fracture.       Impression:        Unremarkable exam. No acute cardiopulmonary findings identified.     This report was finalized on 12/9/2024 8:24 AM by Dr. Sebastian Cantu MD.             ECHO:  Results for orders placed during the hospital encounter of 09/20/22    Adult Transthoracic Echo Complete W/ Cont if Necessary Per Protocol    Interpretation Summary  · Technically difficult echo and Doppler study was obtained.  · Normal left ventricular cavity size noted.  · Left ventricular wall thickness is consistent with mild concentric hypertrophy.  · Left ventricular ejection fraction appears to be 61 - 65%. Left ventricular systolic function is normal.  · Left ventricular diastolic function is consistent with (grade II w/high LAP) pseudonormalization.  · Aortic valve is normal, no aortic stenosis or regurgitation noted.  · Mitral valve appears normal, no mitral stenosis or regurgitation noted.  · Mild tricuspid regurgitation with mild pulmonary hypertension noted.  · No pericardial effusion detected.      STRESS TEST:  Results for orders placed during the hospital encounter of 08/01/17    Stress Test With Myocardial Perfusion One Day    Interpretation Summary  · Breast attenuation and GI artifacts are present.  · Left ventricular ejection fraction is hyperdynamic (Calculated EF > 70%).  · Myocardial perfusion imaging indicates a normal myocardial perfusion study with no evidence of ischemia.  · Impressions are consistent with a low risk study.  · Findings consistent with a normal ECG stress test.  · Fixed ant wall  defect with good wall motion, probably representing breast attenuation.       HEART CATH:  No results found for this or any previous visit.      QTc interval:         EK/10/2024      TELEMETRY:              I reviewed the patient's new clinical results.    ALLERGIES: Cozaar [losartan], Levaquin [levofloxacin], Penicillins, Clindamycin/lincomycin, Sulfa antibiotics, and Vancomycin    Medication Review:   Current list of medications may not reflect those currently placed in orders that are not signed or are being held.     allopurinol, 100 mg, Oral, Daily  apixaban, 5 mg, Oral, Q12H  lisinopril, 5 mg, Oral, Daily  rosuvastatin, 10 mg, Oral, Daily  sodium chloride, 10 mL, Intravenous, Q12H  sotalol, 80 mg, Oral, Q12H           Calcium Replacement - Follow Nurse / BPA Driven Protocol    furosemide    Magnesium Cardiology Dose Replacement - Follow Nurse / BPA Driven Protocol    nitroglycerin    Pharmacy Consult    Phosphorus Replacement - Follow Nurse / BPA Driven Protocol    Potassium Replacement - Follow Nurse / BPA Driven Protocol    [COMPLETED] Insert Peripheral IV **AND** sodium chloride    sodium chloride    sodium chloride      Assessment    1.  Persistent atrial fibrillation, [id number] score of 3 patient was started on sotalol to prep her for cardioversion  2.  Obstructive sleep apnea patient not using the CPAP  3.  Essential hypertension  4.  Diabetes mellitus type 2            Recommendations / Plan   1.  Patient QTc is acceptable continue with the sotalol load  2.  Plan for direct-current cardioversion in a.m. tomorrow continue with the anticoagulation with Eliquis  3.  Discussed with the patient the importance of using the CPAP she is agreeable and she will restart using it once she is being discharged home          I have discussed the patients findings and recommendations with the patient.    Thank you very much for asking us to be involved in this patient's care.      Electronically signed by  Kaylie To, APRN, 12/10/24, 11:47 AM EST.   Electronically signed by Celestine Troy MD, 12/10/24, 12:23 PM EST.                    Please note that portions of this note were completed with a voice recognition program.    Please note that portions of this note were copied and has been reviewed and is accurate as of 12/10/2024 .

## 2024-12-10 NOTE — PROGRESS NOTES
[facility]     Progress Note    Patient Name: Coni Tubbs  : 1965  MRN: [id number]  Primary Care Physician:  Donte Finney APRN  Date of admission: 2024    Subjective   Subjective     Chief Complaint: 59-year-old female being seen in follow-up for symptomatic palpitations    History of Present Illness  Patient Reports feeling well today when seen at bedside. No palpitations.     A-fib in the 70s per my view of telemetry.    Review of Systems   Constitutional:  Negative for chills and fever.   Respiratory:  Negative for cough and shortness of breath.    Cardiovascular:  Negative for chest pain and palpitations.   Gastrointestinal:  Negative for vomiting.     Objective   Objective     Vitals:   Temp:  [97.5 °F (36.4 °C)-98.5 °F (36.9 °C)] 98 °F (36.7 °C)  Heart Rate:  [70-81] 70  Resp:  [18-20] 18  BP: (101-138)/(62-88) 113/67    Physical Exam  Constitutional:       General: She is not in acute distress.     Appearance: She is well-developed.   HENT:      Head: Normocephalic and atraumatic.   Eyes:      Conjunctiva/sclera: Conjunctivae normal.   Neck:      Trachea: No tracheal deviation.   Cardiovascular:      Rate and Rhythm: Normal rate. Rhythm irregular.      Pulses:           Dorsalis pedis pulses are 2+ on the right side and 2+ on the left side.      Heart sounds: No murmur heard.     No friction rub. No gallop.   Pulmonary:      Effort: No respiratory distress.      Breath sounds: Normal breath sounds. No wheezing or rales.   Abdominal:      General: Bowel sounds are normal. There is no distension.      Palpations: Abdomen is soft.      Tenderness: There is no abdominal tenderness. There is no guarding.   Musculoskeletal:         General: No tenderness. Normal range of motion.      Right lower leg: No edema.      Left lower leg: No edema.   Skin:     General: Skin is warm and dry.      Findings: No erythema or rash.   Neurological:      Mental Status: She is alert and oriented to  person, place, and time.      Cranial Nerves: No cranial nerve deficit.          Result Review    Result Review:  I have personally reviewed the results from the time of this admission to 12/10/2024 15:53 EST and agree with these findings:  [x]  Laboratory list / accordion  []  Microbiology  []  Radiology  []  EKG/Telemetry   []  Cardiology/Vascular   []  Pathology  []  Old records  []  Other:  Most notable findings include: EKG from today at 0611 revealed A-fib in the 70s, no acute ST elevations, QTc 450 ms    Results from last 7 days   Lab Units 12/09/24  0728   WBC 10*3/mm3 8.81   HEMOGLOBIN g/dL 14.7   HEMATOCRIT % 42.8   PLATELETS 10*3/mm3 180     Results from last 7 days   Lab Units 12/10/24  0047 12/09/24  0728   SODIUM mmol/L 137 137   POTASSIUM mmol/L 4.2 4.0   CHLORIDE mmol/L 106 105   CO2 mmol/L 24.0 21.3*   BUN mg/dL 15 18   CREATININE mg/dL 1.02* 0.82   CALCIUM mg/dL 8.6 9.4   BILIRUBIN mg/dL  --  0.6   ALK PHOS U/L  --  67   ALT (SGPT) U/L  --  12   AST (SGOT) U/L  --  14   GLUCOSE mg/dL 186* 252*         Assessment & Plan   Assessment / Plan     #Persistent atrial fibrillation with symptomatic palpitations, [id number]  #SUSAN, non-compliant with CPAP  #DM type II complicated by hyperglycemia  #Essential hypertension that is currently controlled  #Hyperlipidemia  #Acute renal insufficiency  #Morbid obesity with BMI 39.20 kg/m², complicates all aspects of care  #Macrocytosis without anemia; B12 and folate within normal limits  -Continue the sotalol initiation protocol; QTc is acceptable and within normal limits  -Potassium and magnesium are also well within normal limits  -Consider short course of IV fluid hydration after midnight  -Repeat chemistry panel in a.m.  -Place patient on Accu-Cheks and the low-dose sliding scale insulin as needed  -Patient has been made n.p.o. after midnight as per cardiology for tentative RYAN with DCCV in a.m.    VTE Prophylaxis:  Continue Eliquis        CODE STATUS:     Code Status (Patient has no pulse and is not breathing): CPR (Attempt to Resuscitate)  Medical Interventions (Patient has pulse or is breathing): Full Support    Disposition:  I expect patient to be discharged home when medically stable from cardiac standpoint, suspect 48 hours.    Juani Jerry George, DO

## 2024-12-10 NOTE — PLAN OF CARE
Goal Outcome Evaluation:                       Patient has been resting this shift. No s/s of acute distress noted at this time. Patient has no complaints currently. Plan of care ongoing.

## 2024-12-10 NOTE — CONSULTS
"Diabetes Education  Assessment/Teaching    Patient Name:  Coni Tubbs  YOB: 1965  MRN: [id number]  Admit Date:  12/9/2024      Assessment Date:  12/10/2024  Flowsheet Row Most Recent Value   General Information     Height 182.9 cm (72\")   Height Method Stated   Weight 131 kg (289 lb 0.4 oz)   Weight Method Bed scale   Pregnancy Assessment    Diabetes History    Education Preferences    Nutrition Information    Assessment Topics    DM Goals             Flowsheet Row Most Recent Value   DM Education Needs    Meter Has own   Frequency of Testing PRN   Medication Insulin   Problem Solving Hypoglycemia, Hyperglycemia, Sick days, Signs, Symptoms, Treatment   Reducing Risks Cardiovascular, Immunizations, Foot care, Dental exam, Eye exam, Blood pressure, Lipids, A1C testing, Neuropathy, Retinopathy   Healthy Eating Basic meal plan provided   Physical Activity Walking   Physical Activity Frequency Occasionally   Healthy Coping Appropriate   Discharge Plan Follow-up with PCP   Motivation Moderate   Teaching Method Explanation, Discussion, Handouts   Patient Response Verbalized understanding              Other Comments:  A1C 9.2 Patient was educated and received AADE7 and ADA handouts on diet, activity, checking blood glucose, taking medication as prescribed, checking feet daily and S/S of hypo/hyperglycemia. Patient was educated on sick rule days. Patient had no questions or concerns. Please re-consult or call for concerns or questions. Thank you.        Electronically signed by:  Hannah Morel RN  12/10/24 12:18 EST  "

## 2024-12-10 NOTE — PLAN OF CARE
Goal Outcome Evaluation:    Pt is resting in bed with no s/s of distress noted. VSS. IV access maintained. NPO at midnight for cardioversion in the AM. Will continue with plan of care.

## 2024-12-11 ENCOUNTER — APPOINTMENT (OUTPATIENT)
Dept: CARDIOLOGY | Facility: HOSPITAL | Age: 59
DRG: 310 | End: 2024-12-11
Payer: MEDICARE

## 2024-12-11 ENCOUNTER — ANESTHESIA EVENT (OUTPATIENT)
Dept: CARDIOLOGY | Facility: HOSPITAL | Age: 59
End: 2024-12-11
Payer: MEDICARE

## 2024-12-11 ENCOUNTER — ANESTHESIA (OUTPATIENT)
Dept: CARDIOLOGY | Facility: HOSPITAL | Age: 59
End: 2024-12-11
Payer: MEDICARE

## 2024-12-11 VITALS
WEIGHT: 291.3 LBS | SYSTOLIC BLOOD PRESSURE: 120 MMHG | DIASTOLIC BLOOD PRESSURE: 86 MMHG | RESPIRATION RATE: 16 BRPM | OXYGEN SATURATION: 99 % | HEIGHT: 72 IN | HEART RATE: 60 BPM | BODY MASS INDEX: 39.45 KG/M2 | TEMPERATURE: 98.1 F

## 2024-12-11 PROBLEM — I48.91 A-FIB: Status: RESOLVED | Noted: 2024-12-09 | Resolved: 2024-12-11

## 2024-12-11 LAB
ANION GAP SERPL CALCULATED.3IONS-SCNC: 8.4 MMOL/L (ref 5–15)
BUN SERPL-MCNC: 16 MG/DL (ref 8–23)
BUN/CREAT SERPL: 17.2 (ref 7–25)
CALCIUM SPEC-SCNC: 8.6 MG/DL (ref 8.6–10.5)
CHLORIDE SERPL-SCNC: 104 MMOL/L (ref 98–107)
CO2 SERPL-SCNC: 24.6 MMOL/L (ref 22–29)
CREAT SERPL-MCNC: 0.93 MG/DL (ref 0.57–1)
DEPRECATED RDW RBC AUTO: 47.2 FL (ref 37–54)
EGFRCR SERPLBLD CKD-EPI 2021: 70.5 ML/MIN/1.73
ERYTHROCYTE [DISTWIDTH] IN BLOOD BY AUTOMATED COUNT: 12.8 % (ref 12.3–15.4)
GLUCOSE BLDC GLUCOMTR-MCNC: 167 MG/DL (ref 70–130)
GLUCOSE BLDC GLUCOMTR-MCNC: 168 MG/DL (ref 70–130)
GLUCOSE SERPL-MCNC: 182 MG/DL (ref 65–99)
HCT VFR BLD AUTO: 40.5 % (ref 34–46.6)
HGB BLD-MCNC: 13.9 G/DL (ref 12–15.9)
MAGNESIUM SERPL-MCNC: 1.8 MG/DL (ref 1.6–2.4)
MCH RBC QN AUTO: 34.3 PG (ref 26.6–33)
MCHC RBC AUTO-ENTMCNC: 34.3 G/DL (ref 31.5–35.7)
MCV RBC AUTO: 100 FL (ref 79–97)
PLATELET # BLD AUTO: 157 10*3/MM3 (ref 140–450)
PMV BLD AUTO: 10.2 FL (ref 6–12)
POTASSIUM SERPL-SCNC: 4.2 MMOL/L (ref 3.5–5.2)
QT INTERVAL: 390 MS
QT INTERVAL: 404 MS
QT INTERVAL: 418 MS
QT INTERVAL: 474 MS
QTC INTERVAL: 453 MS
QTC INTERVAL: 465 MS
QTC INTERVAL: 466 MS
QTC INTERVAL: 469 MS
RBC # BLD AUTO: 4.05 10*6/MM3 (ref 3.77–5.28)
SODIUM SERPL-SCNC: 137 MMOL/L (ref 136–145)
WBC NRBC COR # BLD AUTO: 9.77 10*3/MM3 (ref 3.4–10.8)

## 2024-12-11 PROCEDURE — 92960 CARDIOVERSION ELECTRIC EXT: CPT

## 2024-12-11 PROCEDURE — 82948 REAGENT STRIP/BLOOD GLUCOSE: CPT

## 2024-12-11 PROCEDURE — 99238 HOSP IP/OBS DSCHRG MGMT 30/<: CPT | Performed by: INTERNAL MEDICINE

## 2024-12-11 PROCEDURE — 36415 COLL VENOUS BLD VENIPUNCTURE: CPT | Performed by: INTERNAL MEDICINE

## 2024-12-11 PROCEDURE — 80048 BASIC METABOLIC PNL TOTAL CA: CPT | Performed by: INTERNAL MEDICINE

## 2024-12-11 PROCEDURE — 92960 CARDIOVERSION ELECTRIC EXT: CPT | Performed by: INTERNAL MEDICINE

## 2024-12-11 PROCEDURE — 25010000002 PROPOFOL 200 MG/20ML EMULSION: Performed by: NURSE ANESTHETIST, CERTIFIED REGISTERED

## 2024-12-11 PROCEDURE — 83735 ASSAY OF MAGNESIUM: CPT | Performed by: INTERNAL MEDICINE

## 2024-12-11 PROCEDURE — 93005 ELECTROCARDIOGRAM TRACING: CPT | Performed by: INTERNAL MEDICINE

## 2024-12-11 PROCEDURE — 85027 COMPLETE CBC AUTOMATED: CPT | Performed by: INTERNAL MEDICINE

## 2024-12-11 PROCEDURE — 5A2204Z RESTORATION OF CARDIAC RHYTHM, SINGLE: ICD-10-PCS | Performed by: INTERNAL MEDICINE

## 2024-12-11 PROCEDURE — 25010000002 MAGNESIUM SULFATE 4 GM/100ML SOLUTION

## 2024-12-11 RX ORDER — PROPOFOL 10 MG/ML
INJECTION, EMULSION INTRAVENOUS AS NEEDED
Status: DISCONTINUED | OUTPATIENT
Start: 2024-12-11 | End: 2024-12-11 | Stop reason: SURG

## 2024-12-11 RX ORDER — SOTALOL HYDROCHLORIDE 80 MG/1
80 TABLET ORAL EVERY 12 HOURS SCHEDULED
Qty: 60 TABLET | Refills: 0 | Status: SHIPPED | OUTPATIENT
Start: 2024-12-11

## 2024-12-11 RX ORDER — MAGNESIUM SULFATE HEPTAHYDRATE 40 MG/ML
4 INJECTION, SOLUTION INTRAVENOUS ONCE
Status: COMPLETED | OUTPATIENT
Start: 2024-12-11 | End: 2024-12-11

## 2024-12-11 RX ADMIN — ROSUVASTATIN 10 MG: 10 TABLET, FILM COATED ORAL at 09:26

## 2024-12-11 RX ADMIN — LISINOPRIL 5 MG: 2.5 TABLET ORAL at 09:26

## 2024-12-11 RX ADMIN — PROPOFOL 50 MG: 10 INJECTION, EMULSION INTRAVENOUS at 12:45

## 2024-12-11 RX ADMIN — MAGNESIUM SULFATE HEPTAHYDRATE 4 G: 40 INJECTION, SOLUTION INTRAVENOUS at 05:15

## 2024-12-11 RX ADMIN — Medication 10 ML: at 09:26

## 2024-12-11 RX ADMIN — APIXABAN 5 MG: 5 TABLET, FILM COATED ORAL at 09:26

## 2024-12-11 RX ADMIN — SOTALOL HYDROCHLORIDE 80 MG: 80 TABLET ORAL at 09:26

## 2024-12-11 RX ADMIN — ALLOPURINOL 100 MG: 100 TABLET ORAL at 09:26

## 2024-12-11 NOTE — ANESTHESIA PREPROCEDURE EVALUATION
Anesthesia Evaluation     Patient summary reviewed and Nursing notes reviewed                Airway   Mallampati: II  No difficulty expected  Dental - normal exam     Pulmonary    (+) COPD,decreased breath sounds  Cardiovascular   Exercise tolerance: poor (<4 METS)    ECG reviewed  PT is on anticoagulation therapy  Rhythm: irregular  Rate: abnormal    (+) hypertension, dysrhythmias, hyperlipidemia      Neuro/Psych  GI/Hepatic/Renal/Endo    (+) obesity, morbid obesity, diabetes mellitus    Musculoskeletal     Abdominal    Substance History      OB/GYN          Other                    Anesthesia Plan    ASA 3     general   total IV anesthesia    Anesthetic plan, risks, benefits, and alternatives have been provided, discussed and informed consent has been obtained with: patient.    Use of blood products discussed with patient .      CODE STATUS:    Code Status (Patient has no pulse and is not breathing): CPR (Attempt to Resuscitate)  Medical Interventions (Patient has pulse or is breathing): Full Support

## 2024-12-11 NOTE — PAYOR COMM NOTE
"CONTACT:  MARIA ALEJANDRA LANIER RN  UTILIZATION MANAGEMENT DEPT.   [facility]   [address] [facility], [address]   PHONE:  [phone]   FAX: [phone]         INPATIENT AUTH REQUEST---ADMIT TO OBSERVATION 12/9/2024-CONVERTED TO INPATIENT 12/10/2024                Coni Tubbs (59 y.o. Female)       Date of Birth   1965    Social Security Number       Address   [address] [address]    Home Phone   [phone]    MRN   [id number]       Alevism   [facility]    Marital Status   Unknown                            Admission Date   12/10/24    Admission Type   Emergency    Admitting Provider   Juani George DO    Attending Provider   Juani George DO    Department, Room/Bed   [facility] [address] [place], 3316[address]       Discharge Date       Discharge Disposition       Discharge Destination                                 Attending Provider: Juani George DO    Allergies: Cozaar [Losartan], Levaquin [Levofloxacin], Penicillins, Clindamycin/lincomycin, Sulfa Antibiotics, Vancomycin    Isolation: None   Infection: None   Code Status: CPR    Ht: 182.9 cm (72\")   Wt: 132 kg (291 lb 4.8 oz)    Admission Cmt: None   Principal Problem: A-fib [I48.91]                   Active Insurance as of 12/9/2024       Primary Coverage       Payor Plan Insurance Group Employer/Plan Group    HUMANA MEDICARE REPLACEMENT HUMANA MED ADV SNP PPO [id number]       Payor Plan Address Payor Plan Phone Number Payor Plan Fax Number Effective Dates    [address] [address] [phone]  5/1/2023 - None Entered    [facility] [id number]         Subscriber Name Subscriber Birth Date Member ID       CONI TUBBS 1965 [id number]                     Emergency Contacts        (Rel.) Home Phone Work Phone Mobile Phone    Carey Madrigal (Other) [phone] -- [phone]    Daily Madrigal (Other) [phone] -- [phone]                 History & Physical        Pang, " Benny Hanley PA-C at 24 0916       Attestation signed by Juani George DO at 24 1822    I have reviewed this documentation and agree.  Patient also seen and examined at bedside.  She is sitting on the side of the bed working on SCADA Access.  The patient currently denies any symptoms.  She appears to be in rate controlled A-fib at this time.  Patient has some chronic lower extremity edema.    Atrial fibrillation, CHA2DS-VASc=3  HTN  DM type II    Admit per Cardiology recommendations to begin Sotalol protocol. Will order her to be placed on the potassium and magnesium supplemental protocol. Will repeat cbc and chemistry panel in a.m. Continue PO Eliquis.                          [facility]  History & Physical    Patient Identification:  Name:  Coni Tubbs  Age:  59 y.o.  Sex:  female  :  1965  MRN:  [id number]   Visit Number:  [id number]  Admit Date: 2024   Primary Care Physician:  Donte Finney APRN    Subjective     Chief complaint: Atrial fibrillation    History of presenting illness:      Coni Tubbs is a 59 y.o. female who presented for further evaluation at the direction of cardiology.  Was recently seen in clinic secondary to her atrial fibrillation with cardiology recommending admission for sotalol loading.  She was seen and examined in the ED with family present at the bedside. She was pleasant and cooperative. She reported she feels well, at recent baseline. No new or worsening palpitations. No shortness of breath. Some leg swelling but no worse than usual. She is compliant with all home medications. She does not smoke.   Further review of systems obtained and otherwise negative, see below.   She has not missed any doses of anticoagulation. She dose have SUSAN- noncompliant w/ CPAP as she cannot tolerate it.     Past medical history is significant for A-fib, diabetes mellitus, hyperlipidemia, SUSAN    Upon arrival to  the ED, vital signs were temp 98.1, heart rate 92, respirations 17, /76, SpO2 97% on RA.  Reported workup largely unremarkable.  UA was abnormal-cloudy with trace blood, moderate leukocytes, many WBCs and 1+ bacteria though did note 7-12 squamous epithelial cells.  CXR was negative, EKG showed rate controlled atrial fibrillation with QT interval 350.    Known Emergency Department medications received prior to my evaluation included none.   Emergency Department Room location at the time of my evaluation was 108.     ---------------------------------------------------------------------------------------------------------------------   Review of Systems   Constitutional:  Negative for chills and fever.   HENT:  Negative for congestion and rhinorrhea.    Respiratory:  Negative for cough and shortness of breath.    Cardiovascular:  Negative for chest pain, palpitations and leg swelling.   Gastrointestinal:  Negative for abdominal pain, diarrhea, nausea and vomiting.   Genitourinary:  Negative for difficulty urinating and dysuria.   Musculoskeletal:  Negative for arthralgias and myalgias.   Skin:  Negative for rash and wound.   Neurological:  Negative for dizziness and light-headedness.        ---------------------------------------------------------------------------------------------------------------------   Past Medical History:   Diagnosis Date    Emphysema, unspecified     Hypertension     Restless leg syndrome      Past Surgical History:   Procedure Laterality Date    CARDIOVASCULAR STRESS TEST  08/02/2017    ECHO - CONVERTED  07/05/2017    KNEE ARTHROSCOPY Right 2004    SKIN CANCER EXCISION Left 12/2017     Family History   Problem Relation Age of Onset    Heart attack Mother     Heart disease Mother     Diabetes Mother     Kidney failure Mother     Obesity Mother     Kidney disease Father     COPD Father     Stroke Sister     Hypertension Sister     COPD Brother     Heart disease Sister     Heart attack  Sister     Heart failure Sister      Social History     Socioeconomic History    Marital status: Unknown   Tobacco Use    Smoking status: Never    Smokeless tobacco: Never   Vaping Use    Vaping status: Never Used   Substance and Sexual Activity    Alcohol use: No     Comment:  occ  3-4 times a year    Drug use: No    Sexual activity: Defer     ---------------------------------------------------------------------------------------------------------------------   Allergies:  Cozaar [losartan], Levaquin [levofloxacin], Penicillins, Clindamycin/lincomycin, Sulfa antibiotics, and Vancomycin  ---------------------------------------------------------------------------------------------------------------------   Home medications:    Medications below are reported home medications pulling from within the system; at this time, these medications have not been reconciled unless otherwise specified and are in the verification process for further verifcation as current home medications.  (Not in a hospital admission)      Hospital Scheduled Meds:          Current listed hospital scheduled medications may not yet reflect those currently placed in orders that are signed and held awaiting patient's arrival to floor.   ---------------------------------------------------------------------------------------------------------------------     Objective     Vital Signs:  Temp:  [98.1 °F (36.7 °C)] 98.1 °F (36.7 °C)  Heart Rate:  [86-92] 86  Resp:  [17] 17  BP: ()/(71-76) 91/71      12/09/24  0711   Weight: 127 kg (279 lb)     Body mass index is 37.84 kg/m².  ---------------------------------------------------------------------------------------------------------------------       Physical Exam  Vitals and nursing note reviewed.   Constitutional:       General: She is not in acute distress.  HENT:      Head: Normocephalic and atraumatic.   Eyes:      Extraocular Movements: Extraocular movements intact.   Cardiovascular:      Rate  "and Rhythm: Normal rate. Rhythm irregular.   Pulmonary:      Effort: Pulmonary effort is normal.      Breath sounds: Normal breath sounds.   Abdominal:      Palpations: Abdomen is soft.      Tenderness: There is no abdominal tenderness.   Musculoskeletal:      Right lower leg: Edema present.      Left lower leg: Edema present.   Skin:     General: Skin is warm and dry.   Neurological:      Mental Status: She is alert. Mental status is at baseline.   Psychiatric:         Mood and Affect: Mood normal.         Behavior: Behavior normal.             ---------------------------------------------------------------------------------------------------------------------  EKG:        I have personally looked at the EKG.  ---------------------------------------------------------------------------------------------------------------------   Results from last 7 days   Lab Units 12/09/24  0728   WBC 10*3/mm3 8.81   HEMOGLOBIN g/dL 14.7   HEMATOCRIT % 42.8   MCV fL 99.1*   MCHC g/dL 34.3   PLATELETS 10*3/mm3 180         Results from last 7 days   Lab Units 12/09/24  0728   SODIUM mmol/L 137   POTASSIUM mmol/L 4.0   MAGNESIUM mg/dL 1.6   CHLORIDE mmol/L 105   CO2 mmol/L 21.3*   BUN mg/dL 18   CREATININE mg/dL 0.82   CALCIUM mg/dL 9.4   GLUCOSE mg/dL 252*   ALBUMIN g/dL 3.7   BILIRUBIN mg/dL 0.6   ALK PHOS U/L 67   AST (SGOT) U/L 14   ALT (SGPT) U/L 12   Estimated Creatinine Clearance: 109.1 mL/min (by C-G formula based on SCr of 0.82 mg/dL).  No results found for: \"AMMONIA\"  Results from last 7 days   Lab Units 12/09/24  0728   HSTROP T ng/L 10     Results from last 7 days   Lab Units 12/09/24  0728   PROBNP pg/mL 741.8     No results found for: \"HGBA1C\"  Lab Results   Component Value Date    TSH 2.240 12/09/2024    FREET4 0.86 (L) 08/01/2017     No results found for: \"PREGTESTUR\", \"PREGSERUM\", \"HCG\", \"HCGQUANT\"  Pain Management Panel           No data to display              No results found for: \"BLOODCX\"  No results found for: " "\"URINECX\"  No results found for: \"WOUNDCX\"  No results found for: \"STOOLCX\"      ---------------------------------------------------------------------------------------------------------------------  Imaging Results (Last 7 Days)       Procedure Component Value Units Date/Time    XR Chest 1 View [[id number]] Collected: 12/09/24 0823     Updated: 12/09/24 0826    Narrative:      EXAM:    XR Chest, 1 View     EXAM DATE:    12/9/2024 7:27 AM     CLINICAL HISTORY:    Palpitations     TECHNIQUE:    Frontal view of the chest.     COMPARISON:    6/14/2017     FINDINGS:    LUNGS AND PLEURAL SPACES:  Unremarkable as visualized.  No  consolidation.  No pneumothorax.    HEART:  Unremarkable as visualized.  No cardiomegaly.    MEDIASTINUM:  Unremarkable as visualized.  Normal mediastinal contour.    BONES/JOINTS:  Unremarkable as visualized.  No acute fracture.       Impression:        Unremarkable exam. No acute cardiopulmonary findings identified.     This report was finalized on 12/9/2024 8:24 AM by Dr. Sebastian Cantu MD.               Cultures:  No results found for: \"BLOODCX\", \"URINECX\", \"WOUNDCX\", \"MRSACX\", \"RESPCX\", \"STOOLCX\"    Last echocardiogram:  Results for orders placed during the hospital encounter of 09/20/22    Adult Transthoracic Echo Complete W/ Cont if Necessary Per Protocol    Interpretation Summary  · Technically difficult echo and Doppler study was obtained.  · Normal left ventricular cavity size noted.  · Left ventricular wall thickness is consistent with mild concentric hypertrophy.  · Left ventricular ejection fraction appears to be 61 - 65%. Left ventricular systolic function is normal.  · Left ventricular diastolic function is consistent with (grade II w/high LAP) pseudonormalization.  · Aortic valve is normal, no aortic stenosis or regurgitation noted.  · Mitral valve appears normal, no mitral stenosis or regurgitation noted.  · Mild tricuspid regurgitation with mild pulmonary hypertension noted.  · No " pericardial effusion detected.          I have personally reviewed the above radiology images and read the final radiology report on 12/09/24  ---------------------------------------------------------------------------------------------------------------------  Assessment / Plan     Active Hospital Problems    Diagnosis  POA    **A-fib [I48.91]  Yes       ASSESSMENT/PLAN:    Atrial fibrillation  Patient is being admitted for sotalol loading.   Will continue eliquis  Consult cardiology, assistance appreciated.   Start Sotalol 80 mg BID. Plan for EKG monitoring after each dose per cardiology recommendation.   Will monitor electrolytes and renal function closely. Will also check folate, B12  If patient does not convert may require cardioversion during this admission  Cont telemetry monitoring.     DM II  HLD  SUSAN  Cont home meds as indicated  Will cover with SSI if needed while inpatient  Encourage compliance w/ CPAP  ----------  -DVT prophylaxis: chronically anticoagulated  -Activity: ad tiki  -Expected length of stay: OBSERVATION status; however, if further evaluation or treatment plans warrant, status may change.  Based upon current information, I predict patient's care encounter to be less than or equal to 2 midnights   -Disposition pending further clinical course and cardiology recommendations.     High risk secondary to atrial fibrillation, sotalol loading    Code Status and Medical Interventions: CPR (Attempt to Resuscitate); Full Support   Ordered at: 12/09/24 0831     Code Status (Patient has no pulse and is not breathing):    CPR (Attempt to Resuscitate)     Medical Interventions (Patient has pulse or is breathing):    Full Support       Benny Pang PA-C   12/09/24  09:16 EST    Electronically signed by Juani George DO at 12/09/24 8748       Emergency Department Notes    No notes of this type exist for this encounter.       Facility-Administered Medications as of 12/11/2024   Medication Dose  Route Frequency Provider Last Rate Last Admin    allopurinol (ZYLOPRIM) tablet 100 mg  100 mg Oral Daily Juani George DO   100 mg at 12/11/24 0926    apixaban (ELIQUIS) tablet 5 mg  5 mg Oral Q12H Juani George DO   5 mg at 12/11/24 0926    Calcium Replacement - Follow Nurse / BPA Driven Protocol   Not Applicable PRN Juani George DO        dextrose (D50W) (25 g/50 mL) IV injection 25 g  25 g Intravenous Q15 Min PRN Juani George DO        dextrose (GLUTOSE) oral gel 15 g  15 g Oral Q15 Min PRN Juani George DO        furosemide (LASIX) tablet 20 mg  20 mg Oral Daily PRN Juani George DO        glucagon HCl (Diagnostic) injection 1 mg  1 mg Intramuscular Q15 Min PRN Juani George DO        Insulin Lispro (humaLOG) injection 2-7 Units  2-7 Units Subcutaneous 4x Daily AC & at Bedtime Juani George DO   3 Units at 12/10/24 2045    lisinopril (PRINIVIL,ZESTRIL) tablet 5 mg  5 mg Oral Daily Juani George DO   5 mg at 12/11/24 0926    Magnesium Cardiology Dose Replacement - Follow Nurse / BPA Driven Protocol   Not Applicable PRN Juani George DO        [COMPLETED] magnesium sulfate 4g/100mL (PREMIX) infusion  4 g Intravenous Once Juani George DO   Stopped at 12/09/24 1543    [COMPLETED] magnesium sulfate 4g/100mL (PREMIX) infusion  4 g Intravenous Once Davon Askew PA-C   4 g at 12/11/24 0515    nitroglycerin (NITROSTAT) SL tablet 0.4 mg  0.4 mg Sublingual Q5 Min PRN Juani George DO        Pharmacy Consult   Not Applicable Once PRN Juani George DO        Phosphorus Replacement - Follow Nurse / BPA Driven Protocol   Not Applicable PRN Juani George DO        Potassium Replacement - Follow Nurse / BPA Driven Protocol   Not Applicable PRN Juani George DO        rosuvastatin (CRESTOR) tablet 10 mg  10 mg Oral Daily Juani George DO   10 mg at 12/11/24 0926    sodium chloride 0.9 % flush  10 mL  10 mL Intravenous PRN Juani George, DO        sodium chloride 0.9 % flush 10 mL  10 mL Intravenous Q12H Juani George, DO   10 mL at 24 0926    sodium chloride 0.9 % flush 10 mL  10 mL Intravenous PRN Juani George, DO        sodium chloride 0.9 % infusion 40 mL  40 mL Intravenous PRN Juani George, DO        [] sodium chloride 0.9 % infusion  100 mL/hr Intravenous Continuous Juani George,  mL/hr at 12/10/24 2336 100 mL/hr at 12/10/24 2336    sotalol (BETAPACE) tablet 80 mg  80 mg Oral Q12H Juani George, DO   80 mg at 24 0926     Orders (all)        Start     Ordered    24 0600  Magnesium  Morning Draw         24 0440    24 0727  POC Glucose Once  PROCEDURE ONCE        Comments: Complete no more than 45 minutes prior to patient eating      24 0720    24 0600  CBC (No Diff)  Morning Draw         12/10/24 1611    24 0600  Magnesium  Morning Draw,   Status:  Canceled         24 0338    24 0430  magnesium sulfate 4g/100mL (PREMIX) infusion  Once         24 0338    24 0001  NPO Diet NPO Type: Sips with Meds  Diet Effective Midnight         12/10/24 1223    24 0000  Cardioversion External in Cardiology Department  Once        Comments: Dr. Higgins to perform.    12/10/24 1145    24 0000  sodium chloride 0.9 % infusion  Continuous         12/10/24 1611    12/10/24 2300  ECG 12 Lead QT Measurement  Once        Comments: sotalol    12/10/24 2257    12/10/24 1929  POC Glucose Once  PROCEDURE ONCE        Comments: Complete no more than 45 minutes prior to patient eating      12/10/24 1922    12/10/24 1730  Insulin Lispro (humaLOG) injection 2-7 Units  4 Times Daily Before Meals & Nightly         12/10/24 1611    12/10/24 1716  POC Glucose Once  PROCEDURE ONCE        Comments: Complete no more than 45 minutes prior to patient eating      12/10/24 1709    12/10/24 1700  POC Glucose  4x Daily Before Meals & at Bedtime  4 Times Daily Before Meals & at Bedtime      Comments: Complete no more than 45 minutes prior to patient eating      12/10/24 1611    12/10/24 1611  dextrose (GLUTOSE) oral gel 15 g  Every 15 Minutes PRN         12/10/24 1611    12/10/24 1611  dextrose (D50W) (25 g/50 mL) IV injection 25 g  Every 15 Minutes PRN         12/10/24 1611    12/10/24 1611  glucagon HCl (Diagnostic) injection 1 mg  Every 15 Minutes PRN         12/10/24 1611    12/10/24 1432  Inpatient Admission  Once         12/10/24 1431    12/10/24 1223  Diet: Regular/House, Cardiac, Diabetic; Healthy Heart (2-3 Na+); Consistent Carbohydrate; Fluid Consistency: Thin (IDDSI 0)  Diet Effective Now,   Status:  Canceled         12/10/24 1223    12/10/24 1147  ECG 12 Lead QT Measurement  STAT         12/10/24 1146    12/10/24 1146  NPO Diet NPO Type: Sips with Meds  Diet Effective Midnight,   Status:  Canceled         12/10/24 1145    12/10/24 0600  Basic Metabolic Panel  Daily       12/09/24 0954    12/10/24 0600  Magnesium  Daily       12/09/24 0954    12/10/24 0600  Vitamin B12  Morning Draw         12/09/24 0954    12/10/24 0600  Folate  Morning Draw         12/09/24 0954    12/10/24 0600  Magnesium  Morning Draw,   Status:  Canceled         12/09/24 0954    12/09/24 2300  ECG 12 Lead QT Measurement  Once        Comments: sotalol    12/09/24 2039 12/09/24 1930  allopurinol (ZYLOPRIM) tablet 100 mg  Daily         12/09/24 1836 12/09/24 1930  lisinopril (PRINIVIL,ZESTRIL) tablet 5 mg  Daily         12/09/24 1836 12/09/24 1930  rosuvastatin (CRESTOR) tablet 10 mg  Daily         12/09/24 1836 12/09/24 1835  furosemide (LASIX) tablet 20 mg  Daily PRN         12/09/24 1836    12/09/24 1330  ECG 12 Lead QT Measurement  Once         12/09/24 1038    12/09/24 1045  sodium chloride 0.9 % flush 10 mL  Every 12 Hours Scheduled         12/09/24 0954    12/09/24 1045  apixaban (ELIQUIS) tablet 5 mg  Every 12 Hours  Scheduled         12/09/24 0954    12/09/24 1010  sotalol (BETAPACE) tablet 80 mg  Every 12 Hours Scheduled         12/09/24 0954    12/09/24 1010  magnesium sulfate 4g/100mL (PREMIX) infusion  Once         12/09/24 0954    12/09/24 1008  Inpatient Diabetes Educator Consult  Once        Provider:  (Not yet assigned)    12/09/24 1007    12/09/24 0955  ECG 12 Lead Drug Monitoring; Sotalol  Daily       12/09/24 0954    12/09/24 0955  Diet: Cardiac; Healthy Heart (2-3 Na+); Fluid Consistency: Thin (IDDSI 0)  Diet Effective Now,   Status:  Canceled         12/09/24 0954    12/09/24 0955  Inpatient Cardiology Consult  Once        Specialty:  Cardiology  Provider:  Alisia Martinez MD    12/09/24 0954    12/09/24 0954  Pharmacy Consult  Once As Needed         12/09/24 0954    12/09/24 0954  Potassium Replacement - Follow Nurse / BPA Driven Protocol  As Needed         12/09/24 0954    12/09/24 0954  Magnesium Cardiology Dose Replacement - Follow Nurse / BPA Driven Protocol  As Needed         12/09/24 0954    12/09/24 0954  Phosphorus Replacement - Follow Nurse / BPA Driven Protocol  As Needed         12/09/24 0954    12/09/24 0954  Calcium Replacement - Follow Nurse / BPA Driven Protocol  As Needed         12/09/24 0954    12/09/24 0954  Intake & Output  Every Shift       12/09/24 0954    12/09/24 0954  Weigh Patient  Once         12/09/24 0954    12/09/24 0954  ReDs Vest  Once         12/09/24 0954    12/09/24 0954  Insert Peripheral IV  Once         12/09/24 0954    12/09/24 0954  Saline Lock & Maintain IV Access  Continuous,   Status:  Canceled         12/09/24 0954    12/09/24 0954  sodium chloride 0.9 % flush 10 mL  As Needed         12/09/24 0954    12/09/24 0954  sodium chloride 0.9 % infusion 40 mL  As Needed         12/09/24 0954    12/09/24 0954  Continuous Cardiac Monitoring  Continuous        Comments: Follow Standing Orders As Outlined in Process Instructions (Open Order Report to View Full Instructions)     "12/09/24 0954    12/09/24 0954  nitroglycerin (NITROSTAT) SL tablet 0.4 mg  Every 5 Minutes PRN         12/09/24 0954    12/09/24 0954  Maintain IV Access  Continuous         12/09/24 0954    12/09/24 0954  Telemetry - Place Orders & Notify Provider of Results When Patient Experiences Acute Chest Pain, Dysrhythmia or Respiratory Distress  Continuous        Comments: Open Order Report to View Parameters Requiring Provider Notification    12/09/24 0954    12/09/24 0954  May Be Off Telemetry for Tests  Continuous         12/09/24 0954    12/09/24 0954  Notify Provider - Sotalol Parameters  Continuous        Comments: Open Order Report to View Parameters Requiring Provider Notification    12/09/24 0954    12/09/24 0928  High Sensitivity Troponin T 2Hr  PROCEDURE ONCE         12/09/24 0800    12/09/24 0830  Initiate Observation Status  Once         12/09/24 0831    12/09/24 0830  Code Status and Medical Interventions: CPR (Attempt to Resuscitate); Full Support  Continuous         12/09/24 0831    12/09/24 0817  [place] Urine Culture Tube - Urine, Clean Catch  Once         12/09/24 0816    12/09/24 0802  Urinalysis, Microscopic Only - Urine, Clean Catch  Once         12/09/24 0801    12/09/24 0722  XR Chest 1 View  1 Time Imaging         12/09/24 0721    12/09/24 0722  Insert Peripheral IV  Once        Placed in \"And\" Linked Group    12/09/24 0721    12/09/24 0722  Monitor Blood Pressure  Per Hospital Policy         12/09/24 0721    12/09/24 0722  Cardiac Monitoring  Continuous,   Status:  Canceled        Comments: Follow Standing Orders As Outlined in Process Instructions (Open Order Report to View Full Instructions)    12/09/24 0721    12/09/24 0722  Continuous Pulse Oximetry  Continuous         12/09/24 0721    12/09/24 0721  sodium chloride 0.9 % flush 10 mL  As Needed        Placed in \"And\" Linked Group    12/09/24 0721    12/09/24 0716  CBC & Differential  Once         12/09/24 0721    12/09/24 0716  Comprehensive " Metabolic Panel  Once         12/09/24 0721    12/09/24 0716  Urinalysis With Microscopic If Indicated (No Culture) - Urine, Clean Catch  Once         12/09/24 0721    12/09/24 0716  [place] Draw  Once         12/09/24 0721    12/09/24 0716  BNP  Once         12/09/24 0721    12/09/24 0716  High Sensitivity Troponin T  Once         12/09/24 0721    12/09/24 0716  TSH Rfx On Abnormal To Free T4  Once         12/09/24 0721    12/09/24 0716  Magnesium  Once         12/09/24 0721    12/09/24 0716  CBC Auto Differential  PROCEDURE ONCE         12/09/24 0721    12/09/24 0716  Green Top (Gel)  PROCEDURE ONCE         12/09/24 0721    12/09/24 0716  Lavender Top  PROCEDURE ONCE         12/09/24 0721    12/09/24 0716  Gold Top - SST  PROCEDURE ONCE         12/09/24 0721    12/09/24 0716  Light Blue Top  PROCEDURE ONCE         12/09/24 0721    12/09/24 0713  ECG 12 Lead Rhythm Change  STAT         12/09/24 0712    12/09/24 0000  Telemetry Scan  Once         12/09/24 0000    12/09/24 0000  Telemetry Scan  Once         12/09/24 0000    12/09/24 0000  Telemetry Scan  Once         12/09/24 0000    12/09/24 0000  Telemetry Scan  Once         12/09/24 0000    Unscheduled  ECG 12 Lead Drug Monitoring; Sotalol  As Needed      Comments: Nurse to Release 2-4 Hours After Each of the First 5 Doses. - Measure QT/QTc  to Verify QTc Less Than 500 msec    12/09/24 0954    Unscheduled  Follow Hypoglycemia Standing Orders For Blood Glucose <70 & Notify Provider of Treatment  As Needed      Comments: Follow Hypoglycemia Orders As Outlined in Process Instructions (Open Order Report to View Full Instructions)  Notify Provider Any Time Hypoglycemia Treatment is Administered    12/10/24 1611    --  flecainide (TAMBOCOR) 100 MG tablet  2 Times Daily         12/09/24 1045    --  apixaban (ELIQUIS) 5 MG tablet tablet  Every 12 Hours Scheduled         12/09/24 1049    --  rosuvastatin (CRESTOR) 5 MG tablet  Daily         12/09/24 1049                      Physician Progress Notes (all)        Juani George DO at 12/10/24 1553           [facility]     Progress Note    Patient Name: Coni Tubbs  : 1965  MRN: [id number]  Primary Care Physician:  Donte Finney APRN  Date of admission: 2024    Subjective   Subjective     Chief Complaint: 59-year-old female being seen in follow-up for symptomatic palpitations    History of Present Illness  Patient Reports feeling well today when seen at bedside. No palpitations.     A-fib in the 70s per my view of telemetry.    Review of Systems   Constitutional:  Negative for chills and fever.   Respiratory:  Negative for cough and shortness of breath.    Cardiovascular:  Negative for chest pain and palpitations.   Gastrointestinal:  Negative for vomiting.     Objective   Objective     Vitals:   Temp:  [97.5 °F (36.4 °C)-98.5 °F (36.9 °C)] 98 °F (36.7 °C)  Heart Rate:  [70-81] 70  Resp:  [18-20] 18  BP: (101-138)/(62-88) 113/67    Physical Exam  Constitutional:       General: She is not in acute distress.     Appearance: She is well-developed.   HENT:      Head: Normocephalic and atraumatic.   Eyes:      Conjunctiva/sclera: Conjunctivae normal.   Neck:      Trachea: No tracheal deviation.   Cardiovascular:      Rate and Rhythm: Normal rate. Rhythm irregular.      Pulses:           Dorsalis pedis pulses are 2+ on the right side and 2+ on the left side.      Heart sounds: No murmur heard.     No friction rub. No gallop.   Pulmonary:      Effort: No respiratory distress.      Breath sounds: Normal breath sounds. No wheezing or rales.   Abdominal:      General: Bowel sounds are normal. There is no distension.      Palpations: Abdomen is soft.      Tenderness: There is no abdominal tenderness. There is no guarding.   Musculoskeletal:         General: No tenderness. Normal range of motion.      Right lower leg: No edema.      Left lower leg: No edema.   Skin:     General: Skin is warm and dry.       Findings: No erythema or rash.   Neurological:      Mental Status: She is alert and oriented to person, place, and time.      Cranial Nerves: No cranial nerve deficit.          Result Review    Result Review:  I have personally reviewed the results from the time of this admission to 12/10/2024 15:53 EST and agree with these findings:  [x]  Laboratory list / accordion  []  Microbiology  []  Radiology  []  EKG/Telemetry   []  Cardiology/Vascular   []  Pathology  []  Old records  []  Other:  Most notable findings include: EKG from today at 0611 revealed A-fib in the 70s, no acute ST elevations, QTc 450 ms    Results from last 7 days   Lab Units 12/09/24  0728   WBC 10*3/mm3 8.81   HEMOGLOBIN g/dL 14.7   HEMATOCRIT % 42.8   PLATELETS 10*3/mm3 180     Results from last 7 days   Lab Units 12/10/24  0047 12/09/24  0728   SODIUM mmol/L 137 137   POTASSIUM mmol/L 4.2 4.0   CHLORIDE mmol/L 106 105   CO2 mmol/L 24.0 21.3*   BUN mg/dL 15 18   CREATININE mg/dL 1.02* 0.82   CALCIUM mg/dL 8.6 9.4   BILIRUBIN mg/dL  --  0.6   ALK PHOS U/L  --  67   ALT (SGPT) U/L  --  12   AST (SGOT) U/L  --  14   GLUCOSE mg/dL 186* 252*         Assessment & Plan   Assessment / Plan     #Persistent atrial fibrillation with symptomatic palpitations, [id number]  #SUSAN, non-compliant with CPAP  #DM type II complicated by hyperglycemia  #Essential hypertension that is currently controlled  #Hyperlipidemia  #Acute renal insufficiency  #Morbid obesity with BMI 39.20 kg/m², complicates all aspects of care  #Macrocytosis without anemia; B12 and folate within normal limits  -Continue the sotalol initiation protocol; QTc is acceptable and within normal limits  -Potassium and magnesium are also well within normal limits  -Consider short course of IV fluid hydration after midnight  -Repeat chemistry panel in a.m.  -Place patient on Accu-Cheks and the low-dose sliding scale insulin as needed  -Patient has been made n.p.o. after midnight as per cardiology for  tentative RYAN with DCCV in a.m.    VTE Prophylaxis:  Continue Eliquis        CODE STATUS:    Code Status (Patient has no pulse and is not breathing): CPR (Attempt to Resuscitate)  Medical Interventions (Patient has pulse or is breathing): Full Support    Disposition:  I expect patient to be discharged home when medically stable from cardiac standpoint, suspect 48 hours.    Juani George DO               Electronically signed by Juani George DO at 12/10/24 1610       Celestine Troy MD at 12/10/24 0806              [facility]  PROGRESS NOTE    Patient information:  Name: Coni Tubbs  Age/Sex: 59 y.o. female  :  1965        PCP: Donte Finney APRN  Attending: Juani George DO  MRN:  [id number]  Visit Number:  [id number]  LOS: 0  CODE STATUS:    Code Status and Medical Interventions: CPR (Attempt to Resuscitate); Full Support   Ordered at: 24 0831     Code Status (Patient has no pulse and is not breathing):    CPR (Attempt to Resuscitate)     Medical Interventions (Patient has pulse or is breathing):    Full Support     PROBLEM LIST:Principal Problem:    A-fib    Reason for Cardiology follow-up: Sotalol loading     Subjective   ADMISSION INFORMATION:  Chief Complaint   Patient presents with    Atrial Fibrillation     DATE:12/10/2024    HPI:  Coni Tubbs is a 59 y.o. female with a past medical history significant for atrial fibrillation [id number] 3 on Eliquis, hypertension, hyperlipidemia, DMT2 and SUSAN.      Patient presented to [facility] ([facility]) emergency department (ED) on 2024 for sotalol loading.     Primary cardiologist is Dr. Martinez and she was last seen in the office on 2024.    Interval History:   Telemetry reveals atrial fibrillation 70s.  AM EKG reveals a QTc interval of 450 ms and a QRS duration of 78 ms.  AM labs reveal potassium 4.2, creatinine 1.02, and magnesium is 2.0.     Patient was in  "room 316 A when she was seen and examined.  Patient is lying in bed resting quietly.  No acute distress noted at this time.  Patient reports she was tested for SUSAN little bit over a year ago and does have a CPAP at home that she is not compliant with.  Patient reports, \"I do not rest well when using it\".  Encourage patient to be compliant with CPAP.  Discussed with patient cardioversion and patient has agreed to proceed with this for Wednesday, 12/11/2024.    ORDERS:   Planning DCCV on Wednesday, 12/11/2024.  Patient reported compliancy with Eliquis in the last 4 weeks.  N.p.o. after midnight on 12/10/2024, Tuesday    EVENT TIMELINE:   12/9: Sotalol started with AM dose today.  Fifth dose is calculated to be administered on Wednesday morning 12/11/2024.  12/10: Planning DCCV on Wed 12/11.      Objective     Vital Signs  Temp:  [97.5 °F (36.4 °C)-98.5 °F (36.9 °C)] 97.8 °F (36.6 °C)  Heart Rate:  [63-86] 81  Resp:  [18-20] 18  BP: ()/(59-88) 101/63  Device (Oxygen Therapy): room air  Vital Signs (last 72 hrs)         12/07 0700  12/08 0659 12/08 0700  12/09 0659 12/09 0700  12/10 0659 12/10 0700  12/10 0806   Most Recent      Temp (°F)     97.5 -  98.5       97.8 (36.6) 12/10 0644    Heart Rate     63 -  92       81 12/10 0644    Resp     17 -  20       18 12/10 0644    BP     91/71 -  138/88       101/63 12/10 0644    SpO2 (%)     96 -  97       96 12/10 0644          BMI:Body mass index is 39.2 kg/m².    WEIGHT:      12/09/24  0711 12/09/24  1006 12/10/24  0500   Weight: 127 kg (279 lb) 129 kg (285 lb 7.9 oz) 131 kg (289 lb 0.4 oz)       DIET:Diet: Cardiac; Healthy Heart (2-3 Na+); Fluid Consistency: Thin (IDDSI 0)    I&O:  Intake & Output (last 3 days)         12/07 0701  12/08 0700 12/08 0701  12/09 0700 12/09 0701  12/10 0700 12/10 0701  12/11 0700    P.O.   360     I.V. (mL/kg)   100.3 (0.8)     Total Intake(mL/kg)   460.3 (3.5)     Net   +460.3             Urine Unmeasured Occurrence   4 x            " "  PHYSICAL EXAM:  Vitals and nursing note reviewed.   Constitutional:       Appearance: Not in distress.   Neck:      Vascular: No JVR. JVD normal.   Pulmonary:      Effort: Pulmonary effort is normal.      Breath sounds: Normal breath sounds. No wheezing. No rhonchi. No rales.   Cardiovascular:      Normal rate. Irregular rhythm. Normal S1. Normal S2.       Murmurs: There is no murmur.      No gallop.  No click. No rub.   Pulses:     Intact distal pulses.   Edema:     Peripheral edema absent.   Musculoskeletal:      Cervical back: Normal range of motion and neck supple. Skin:     General: Skin is warm and dry.   Neurological:      General: No focal deficit present.      Mental Status: Alert and oriented to person, place and time.   Psychiatric:         Behavior: Behavior is cooperative.                  Results review   Results Review:    I have reviewed the patient's new clinical results. 12/10/24 08:06 EST    Results from last 7 days   Lab Units 12/09/24  0922 12/09/24  0728   HSTROP T ng/L 9 10     Lab Results   Component Value Date    PROBNP 741.8 12/09/2024     Results from last 7 days   Lab Units 12/09/24  0728   WBC 10*3/mm3 8.81   HEMOGLOBIN g/dL 14.7   PLATELETS 10*3/mm3 180     Results from last 7 days   Lab Units 12/10/24  0047 12/09/24  0728   SODIUM mmol/L 137 137   POTASSIUM mmol/L 4.2 4.0   CHLORIDE mmol/L 106 105   CO2 mmol/L 24.0 21.3*   BUN mg/dL 15 18   CREATININE mg/dL 1.02* 0.82   CALCIUM mg/dL 8.6 9.4   GLUCOSE mg/dL 186* 252*   ALT (SGPT) U/L  --  12   AST (SGOT) U/L  --  14     Lab Results   Component Value Date    MG 2.0 12/10/2024    MG 1.6 12/09/2024     Estimated Creatinine Clearance: 89.2 mL/min (A) (by C-G formula based on SCr of 1.02 mg/dL (H)).    No results found for: \"HGBA1C\"  Lab Results   Component Value Date    CHOL 151 08/01/2017    TRIG 154 (H) 08/01/2017    LDL 91 08/01/2017    HDL 29 (L) 08/01/2017     Lab Results   Component Value Date    ABSOLUTELUNG 34 12/09/2024     No " "results found for: \"INR\"  No results found for: \"LABHEPA\"    Lab Results   Component Value Date    TSH 2.240 12/09/2024      Pain Management Panel           No data to display              Microbiology Results (last 10 days)       ** No results found for the last 240 hours. **           Imaging Results (Last 24 Hours)       Procedure Component Value Units Date/Time    XR Chest 1 View [[id number]] Collected: 12/09/24 0823     Updated: 12/09/24 0826    Narrative:      EXAM:    XR Chest, 1 View     EXAM DATE:    12/9/2024 7:27 AM     CLINICAL HISTORY:    Palpitations     TECHNIQUE:    Frontal view of the chest.     COMPARISON:    6/14/2017     FINDINGS:    LUNGS AND PLEURAL SPACES:  Unremarkable as visualized.  No  consolidation.  No pneumothorax.    HEART:  Unremarkable as visualized.  No cardiomegaly.    MEDIASTINUM:  Unremarkable as visualized.  Normal mediastinal contour.    BONES/JOINTS:  Unremarkable as visualized.  No acute fracture.       Impression:        Unremarkable exam. No acute cardiopulmonary findings identified.     This report was finalized on 12/9/2024 8:24 AM by Dr. Sebastian Cantu MD.             ECHO:  Results for orders placed during the hospital encounter of 09/20/22    Adult Transthoracic Echo Complete W/ Cont if Necessary Per Protocol    Interpretation Summary  · Technically difficult echo and Doppler study was obtained.  · Normal left ventricular cavity size noted.  · Left ventricular wall thickness is consistent with mild concentric hypertrophy.  · Left ventricular ejection fraction appears to be 61 - 65%. Left ventricular systolic function is normal.  · Left ventricular diastolic function is consistent with (grade II w/high LAP) pseudonormalization.  · Aortic valve is normal, no aortic stenosis or regurgitation noted.  · Mitral valve appears normal, no mitral stenosis or regurgitation noted.  · Mild tricuspid regurgitation with mild pulmonary hypertension noted.  · No pericardial effusion " detected.      STRESS TEST:  Results for orders placed during the hospital encounter of 17    Stress Test With Myocardial Perfusion One Day    Interpretation Summary  · Breast attenuation and GI artifacts are present.  · Left ventricular ejection fraction is hyperdynamic (Calculated EF > 70%).  · Myocardial perfusion imaging indicates a normal myocardial perfusion study with no evidence of ischemia.  · Impressions are consistent with a low risk study.  · Findings consistent with a normal ECG stress test.  · Fixed ant wall defect with good wall motion, probably representing breast attenuation.       HEART CATH:  No results found for this or any previous visit.      QTc interval:         EK/10/2024      TELEMETRY:              I reviewed the patient's new clinical results.    ALLERGIES: Cozaar [losartan], Levaquin [levofloxacin], Penicillins, Clindamycin/lincomycin, Sulfa antibiotics, and Vancomycin    Medication Review:   Current list of medications may not reflect those currently placed in orders that are not signed or are being held.     allopurinol, 100 mg, Oral, Daily  apixaban, 5 mg, Oral, Q12H  lisinopril, 5 mg, Oral, Daily  rosuvastatin, 10 mg, Oral, Daily  sodium chloride, 10 mL, Intravenous, Q12H  sotalol, 80 mg, Oral, Q12H           Calcium Replacement - Follow Nurse / BPA Driven Protocol    furosemide    Magnesium Cardiology Dose Replacement - Follow Nurse / BPA Driven Protocol    nitroglycerin    Pharmacy Consult    Phosphorus Replacement - Follow Nurse / BPA Driven Protocol    Potassium Replacement - Follow Nurse / BPA Driven Protocol    [COMPLETED] Insert Peripheral IV **AND** sodium chloride    sodium chloride    sodium chloride      Assessment    1.  Persistent atrial fibrillation, [id number] score of 3 patient was started on sotalol to prep her for cardioversion  2.  Obstructive sleep apnea patient not using the CPAP  3.  Essential hypertension  4.  Diabetes mellitus type  2            Recommendations / Plan   1.  Patient QTc is acceptable continue with the sotalol load  2.  Plan for direct-current cardioversion in a.m. tomorrow continue with the anticoagulation with Eliquis  3.  Discussed with the patient the importance of using the CPAP she is agreeable and she will restart using it once she is being discharged home          I have discussed the patients findings and recommendations with the patient.    Thank you very much for asking us to be involved in this patient's care.      Electronically signed by LOLIS Ruffin, 12/10/24, 11:47 AM EST.   Electronically signed by Celestine Troy MD, 12/10/24, 12:23 PM EST.                    Please note that portions of this note were completed with a voice recognition program.    Please note that portions of this note were copied and has been reviewed and is accurate as of 12/10/2024 .       Electronically signed by Celestine Troy MD at 12/10/24 1226          Consult Notes (all)        Efe Higgins MD at 12/09/24 0947              [facility]  CONSULT  NOTE      Patient information:  Date of Admit: 12/9/2024  Date of Consult: 12/09/24  Hospitalist/Referring MD:Juani George DO;   PCP: Donte Finney APRN  MRN:  [id number]  Visit Number:  [id number]    LOS: 0  CODE STATUS:  Code Status and Medical Interventions: CPR (Attempt to Resuscitate); Full Support   Ordered at: 12/09/24 0831     Code Status (Patient has no pulse and is not breathing):    CPR (Attempt to Resuscitate)     Medical Interventions (Patient has pulse or is breathing):    Full Support       PROBLEM LIST: Principal Problem:    A-fib      Consults  09:47 EST  12/9/2024    Reason for Cardiology consultation: Sotalol loading     General Cardiology Consulting Physician: Dr. Gisela MD    Primary Cardiologist: Dr. Michelle Licona    Atrial fibrillation.  [id number] is 3 for hypertension, diabetes mellitus  and gender.  Hypertension  Diabetes mellitus type 2        Planning   Confirmed with the patient she reports that she has been compliant with her Eliquis 5 mg p.o. twice daily  And did not miss any doses for last 4 doses  Sotalol initiated using the sotalol order set.  If QTc interval prolongs to >500 msec or increases >=20% from baseline, discontinue sotalol.  On sotalol 80 mg p.o. twice daily.  Get an EKG 2 to 4 hours after each dose of sotalol for first 5 doses.  Tentative plan for cardioversion on 12/11/2024 in case patient continues to be in atrial fibrillation.           Subjective Data     12/9/2024    ADMISSION INFORMATION:  Chief Complaint   Patient presents with    Atrial Fibrillation     History of Present Illness (HPI)  HPI obtained from chart review     Coni Tubbs is a 59 y.o. female with a past medical history significant for atrial fibrillation [id number] 3 on Eliquis, hypertension, hyperlipidemia, DMT2 and SUSAN.     Patient presented to [facility] ([facility] emergency department (ED) on 12/9/2024 for sotalol loading.     Cardiology has been consulted for further evaluation and management for sotalol loading  .     Primary Cardiologist has been Dr. Martinez and she was last seen in the office on 12/05/2024. Patient has tried Flecainide in the past. Her flecainide dose was increased and she continued to maintain sinus rhythm until 7/23/2024 when she went back into A-fib. She was in flecainide 100 Mg twice daily which was eventually discontinued on 11/7/2024 with plans to be started on sotalol. It was discussed the need for inpatient admission and close monitoring for at least 5 doses of sotalol with regular EKG after each dose and daily BMP and magnesium.  Will plan for DCCV if patient fails to convert to sinus rhythm with sotalol during that admission.  Patient voiced understanding.  Patient would like to come to the hospital Monday [12/9] morning for admission and will tentatively plan for  DCCV for persistent A-fib on Wednesday.    She reported medical compliancy with anticoagulation and has not missed any doses.     EKG showed rate controlled atrial fibrillation with QT interval 350.  Magnesium is 1.6 but has been supplemented per patient's MAR.    Patient was in room 316 A when she was seen and examined by Dr. Higgins.  Patient is lying in bed resting quietly.  No acute distress noted at this time.  Patient has no acute complaints.  She reports she is already had her first dose of sotalol earlier this morning.  Several family members are at bedside.    EVENT TIMELINE:  12/9: Sotalol started with AM dose today.    Personal History     Cardiac risk factors:diabetes mellitus, hypercholesterolemia, hypertension, and Obesity      Last Echo: Results for orders placed during the hospital encounter of 09/20/22    Adult Transthoracic Echo Complete W/ Cont if Necessary Per Protocol    Interpretation Summary  · Technically difficult echo and Doppler study was obtained.  · Normal left ventricular cavity size noted.  · Left ventricular wall thickness is consistent with mild concentric hypertrophy.  · Left ventricular ejection fraction appears to be 61 - 65%. Left ventricular systolic function is normal.  · Left ventricular diastolic function is consistent with (grade II w/high LAP) pseudonormalization.  · Aortic valve is normal, no aortic stenosis or regurgitation noted.  · Mitral valve appears normal, no mitral stenosis or regurgitation noted.  · Mild tricuspid regurgitation with mild pulmonary hypertension noted.  · No pericardial effusion detected.         Last Stress: Results for orders placed during the hospital encounter of 08/01/17    Stress Test With Myocardial Perfusion One Day    Interpretation Summary  · Breast attenuation and GI artifacts are present.  · Left ventricular ejection fraction is hyperdynamic (Calculated EF > 70%).  · Myocardial perfusion imaging indicates a normal myocardial perfusion  study with no evidence of ischemia.  · Impressions are consistent with a low risk study.  · Findings consistent with a normal ECG stress test.  · Fixed ant wall defect with good wall motion, probably representing breast attenuation.      Last Cath:  None found on chart review.                         Past Medical History:   Diagnosis Date    Cancer     Diabetes mellitus     Elevated cholesterol     Emphysema, unspecified     Hypertension     Restless leg syndrome     Sleep apnea      Past Surgical History:   Procedure Laterality Date    CARDIOVASCULAR STRESS TEST  08/02/2017    ECHO - CONVERTED  07/05/2017    EYE SURGERY      KNEE ARTHROSCOPY Right 2004    SKIN BIOPSY      SKIN CANCER EXCISION Left 12/2017     Family History   Problem Relation Age of Onset    Heart attack Mother     Heart disease Mother     Diabetes Mother     Kidney failure Mother     Obesity Mother     Kidney disease Father     COPD Father     Stroke Sister     Hypertension Sister     COPD Brother     Heart disease Sister     Heart attack Sister     Heart failure Sister      Social History     Tobacco Use    Smoking status: Never     Passive exposure: Never    Smokeless tobacco: Never   Vaping Use    Vaping status: Never Used   Substance Use Topics    Alcohol use: No     Comment:  occ  3-4 times a year    Drug use: No     ALLERGIES: Cozaar [losartan], Levaquin [levofloxacin], Penicillins, Clindamycin/lincomycin, Sulfa antibiotics, and Vancomycin    Medications listed below are reported home medications pulling from within the system:  Medications Prior to Admission   Medication Sig Dispense Refill Last Dose/Taking    allopurinol (ZYLOPRIM) 100 MG tablet Take 1 tablet by mouth Daily.  2 12/8/2024    apixaban (ELIQUIS) 5 MG tablet tablet Take 1 tablet by mouth Every 12 (Twelve) Hours.   12/8/2024    flecainide (TAMBOCOR) 100 MG tablet Take 1 tablet by mouth 2 (Two) Times a Day.   12/8/2024    lisinopril (PRINIVIL,ZESTRIL) 5 MG tablet Take 1 tablet  by mouth Daily. 90 tablet 1 12/8/2024    metoprolol succinate XL (TOPROL-XL) 25 MG 24 hr tablet TAKE TWO TABLETS BY MOUTH EVERY  tablet 1 12/8/2024    rosuvastatin (CRESTOR) 5 MG tablet Take 2 tablets by mouth Daily.   12/8/2024    furosemide (LASIX) 20 MG tablet Take 1 tablet by mouth Daily As Needed (swelling).   Unknown    Ozempic, 0.25 or 0.5 MG/DOSE, 2 MG/3ML solution pen-injector Inject 0.25 mg under the skin into the appropriate area as directed 1 (One) Time Per Week.   12/6/2024    rosuvastatin (CRESTOR) 10 MG tablet Take 1 tablet by mouth Daily. 90 tablet 1 Unknown       Review of Systems   Constitutional:  Negative for activity change, appetite change and diaphoresis.   HENT:  Negative for facial swelling and trouble swallowing.    Eyes:  Negative for visual disturbance.   Respiratory:  Negative for shortness of breath.    Cardiovascular:  Negative for chest pain, palpitations and leg swelling.   Gastrointestinal:  Negative for blood in stool, nausea and vomiting.   Endocrine: Negative.    Genitourinary:  Negative for hematuria.   Musculoskeletal:  Negative for gait problem.   Skin:  Negative for color change.   Neurological:  Negative for dizziness, syncope, speech difficulty, weakness, light-headedness and headaches.   Psychiatric/Behavioral:  Negative for agitation and behavioral problems.      Objective Data   Vital Signs  Temp:  [98.1 °F (36.7 °C)] 98.1 °F (36.7 °C)  Heart Rate:  [63-92] 63  Resp:  [17-18] 18  BP: ()/(59-77) 105/59  Device (Oxygen Therapy): room air  Vital Signs (last 72 hrs)         12/06 0700  12/07 0659 12/07 0700 12/08 0659 12/08 0700 12/09 0659 12/09 0700 12/09 0947   Most Recent      Temp (°F)         98.1     98.1 (36.7) 12/09 0711    Heart Rate       63 -  92     63 12/09 0930    Resp         17     17 12/09 0711    BP       91/71 -  108/76     106/77 12/09 0930    SpO2 (%)       96 -  97     96 12/09 0930          BMI:   Body mass index is 38.72  kg/m².  WEIGHT:  Wt Readings from Last 3 Encounters:   12/09/24 129 kg (285 lb 7.9 oz)   12/05/24 127 kg (279 lb)   11/21/24 128 kg (282 lb)     DIET:  Diet: Cardiac; Healthy Heart (2-3 Na+); Fluid Consistency: Thin (IDDSI 0)  I&O:  Intake & Output (last 3 days)       None          Physical Exam  Constitutional:       Appearance: Normal appearance.   HENT:      Head: Normocephalic and atraumatic.      Nose: Nose normal.      Mouth/Throat:      Mouth: Mucous membranes are moist.      Pharynx: Oropharynx is clear.   Eyes:      Conjunctiva/sclera: Conjunctivae normal.   Cardiovascular:      Rate and Rhythm: Normal rate. Rhythm irregular.      Pulses: Normal pulses.      Heart sounds: Normal heart sounds.   Pulmonary:      Effort: Pulmonary effort is normal.      Breath sounds: Normal breath sounds.   Abdominal:      General: Bowel sounds are normal.      Palpations: Abdomen is soft.   Musculoskeletal:         General: Normal range of motion.      Cervical back: Normal range of motion.   Skin:     General: Skin is warm and dry.   Neurological:      General: No focal deficit present.      Mental Status: She is alert and oriented to person, place, and time.   Psychiatric:         Mood and Affect: Mood normal.         Behavior: Behavior normal.         Results review   Results Review:    I have reviewed the patient's new clinical results. 12/09/24 12:25 EST    Results from last 7 days   Lab Units 12/09/24  0922 12/09/24  0728   HSTROP T ng/L 9 10     Lab Results   Component Value Date    PROBNP 741.8 12/09/2024     Results from last 7 days   Lab Units 12/09/24  0728   WBC 10*3/mm3 8.81   HEMOGLOBIN g/dL 14.7   PLATELETS 10*3/mm3 180     Results from last 7 days   Lab Units 12/09/24  0728   SODIUM mmol/L 137   POTASSIUM mmol/L 4.0   CHLORIDE mmol/L 105   CO2 mmol/L 21.3*   BUN mg/dL 18   CREATININE mg/dL 0.82   CALCIUM mg/dL 9.4   GLUCOSE mg/dL 252*   ALT (SGPT) U/L 12   AST (SGOT) U/L 14     Lab Results   Component Value  "Date    MG 1.6 2024     Lab Results   Component Value Date    CHOL 151 2017    TRIG 154 (H) 2017    HDL 29 (L) 2017    LDL 91 2017     Estimated Creatinine Clearance: 110.5 mL/min (by C-G formula based on SCr of 0.82 mg/dL).  No results found for: \"HGBA1C\"  No results found for: \"INR\"  No results found for: \"LABHEPA\"  No components found for: \"DIG\"  Lab Results   Component Value Date    TSH 2.240 2024      No results found for: \"URICACID\"  Pain Management Panel           No data to display              Microbiology Results (last 10 days)       ** No results found for the last 240 hours. **           No results found for: \"BLOODCX\"      QTc Interval:         EC2024        ECG/EMG Results (last 24 hours)       Procedure Component Value Units Date/Time    ECG 12 Lead Rhythm Change [[id number]] Collected: 24     Updated: 24     QT Interval 350 ms      QTC Interval 435 ms     Narrative:      Test Reason : Rhythm Change  Blood Pressure :   */*   mmHG  Vent. Rate :  93 BPM     Atrial Rate : 147 BPM     P-R Int :   * ms          QRS Dur :  82 ms      QT Int : 350 ms       P-R-T Axes :   * -14  31 degrees    QTcB Int : 435 ms    Atrial fibrillation  Low voltage QRS  Cannot rule out Anterior infarct , age undetermined  Abnormal ECG  When compared with ECG of 2017 09:44,  Atrial fibrillation has replaced Sinus rhythm  Minimal criteria for Anterior infarct are now present  Nonspecific T wave abnormality now evident in Anterolateral leads    Referred By: PAU           Confirmed By:             TELEMETRY:                   RADIOLOGY STUDIES:  Imaging Results (Last 72 Hours)       Procedure Component Value Units Date/Time    XR Chest 1 View [[id number]] Collected: 24     Updated: 24    Narrative:      EXAM:    XR Chest, 1 View     EXAM DATE:    2024 7:27 AM     CLINICAL HISTORY:    Palpitations     TECHNIQUE:    " Frontal view of the chest.     COMPARISON:    6/14/2017     FINDINGS:    LUNGS AND PLEURAL SPACES:  Unremarkable as visualized.  No  consolidation.  No pneumothorax.    HEART:  Unremarkable as visualized.  No cardiomegaly.    MEDIASTINUM:  Unremarkable as visualized.  Normal mediastinal contour.    BONES/JOINTS:  Unremarkable as visualized.  No acute fracture.       Impression:        Unremarkable exam. No acute cardiopulmonary findings identified.     This report was finalized on 12/9/2024 8:24 AM by Dr. Sebastian Cantu MD.               ALLERGIES: Cozaar [losartan], Levaquin [levofloxacin], Penicillins, Clindamycin/lincomycin, Sulfa antibiotics, and Vancomycin    CURRENT MEDICATIONS:  Current list of medications may not reflect those currently placed in orders that are not signed or are being held.     apixaban, 5 mg, Oral, Q12H  magnesium sulfate, 4 g, Intravenous, Once  sodium chloride, 10 mL, Intravenous, Q12H  sotalol, 80 mg, Oral, Q12H           Calcium Replacement - Follow Nurse / BPA Driven Protocol    Magnesium Cardiology Dose Replacement - Follow Nurse / BPA Driven Protocol    nitroglycerin    Pharmacy Consult    Phosphorus Replacement - Follow Nurse / BPA Driven Protocol    Potassium Replacement - Follow Nurse / BPA Driven Protocol    [COMPLETED] Insert Peripheral IV **AND** sodium chloride    sodium chloride    sodium chloride        Thank you very much for asking us to be involved in this patient's care. Please do not hesitate to call for any questions or concerns.     I have discussed the patients findings and recommendations with the patient.    Electronically signed by LOLIS Ruffin, 12/09/24, 12:36 PM EST.                       Please note that portions of this note were copied and has been reviewed and is accurate as of 12/9/2024 .      Please note that portions of this note were completed with a voice recognition program.     Electronically signed by Efe Higgins MD at 12/09/24  [id number]

## 2024-12-11 NOTE — PLAN OF CARE
Goal Outcome Evaluation:      Patient being dc home this shift. Family aware and at bedside. IV and tele removed, no s/s of acute distress noted at this time.

## 2024-12-11 NOTE — DISCHARGE SUMMARY
[facility] HOSPITALISTS DISCHARGE SUMMARY    Patient Identification:  Name:  Coni Tubbs  Age:  59 y.o.  Sex:  female  :  1965  MRN:  [id number]  Visit Number:  [id number]    Date of Admission: 2024  Date of Discharge:  2024    PCP: Donte Finney APRN    DISCHARGE DIAGNOSIS  #Persistent atrial fibrillation with symptomatic palpitations, [id number] 2  #SUSAN, noncompliant with CPAP  #Essential hypertension that is currently controlled  #Diabetes mellitus type 2 complicated by hyperglycemia  #Hyperlipidemia  #Macrocytosis without anemia; B12 and folate levels within normal limits  #Acute renal insufficiency, resolved  #Morbid obesity with BMI 39.51 kg/m², complicates all aspects of care    CONSULTS   Cardiology    PROCEDURES PERFORMED  2024: Transesophageal echocardiogram with cardioversion    HOSPITAL COURSE  Patient is a 59 y.o. female presented to [facility] complaining of intermittent palpitations, failure of flecanide and wishes to begin sotalol.  Please see the admitting history and physical for further details.      Patient had been evaluated by Cardiology on 24 in the outpatient setting with a planned inpatient admission for sotalol initiation on 2024.  Patient was admitted to the hospital medicine service and underwent basic laboratory studies in the emergency department.  The patient's potassium was well within normal limits at 4.0.  Her magnesium was borderline low at 1.6 and she was started on the magnesium supplementation protocol.    Patient was started on the sotalol initiation protocol.  Patient had serial EKGs per protocol to monitor her QT intervals. Cardiology was consulted early on during her hospitalization. She was continued on her home Eliquis.  Unfortunately, the patient remained in atrial fibrillation although the majority was rate controlled. Cardiology decided to proceed with transesophageal echocardiogram with  cardioversion. Patient was converted to sinus rhythm/sinus bradycardia on 12/11/24. Patient tolerated procedure well without evidence of postprocedural complications. Patient deemed stable for discharge home per Cardiology. Medication changes/additions as noted above. I have requested close follow up with her primary care provider and her primary cardiologist.        VITAL SIGNS:  Temp:  [97.5 °F (36.4 °C)-99.2 °F (37.3 °C)] 98.1 °F (36.7 °C)  Heart Rate:  [59-88] 60  Resp:  [16-18] 16  BP: ()/(65-93) 120/86  SpO2:  [92 %-100 %] 99 %  on   ;   Device (Oxygen Therapy): room air    Body mass index is 39.51 kg/m².  Wt Readings from Last 3 Encounters:   12/11/24 132 kg (291 lb 4.8 oz)   12/05/24 127 kg (279 lb)   11/21/24 128 kg (282 lb)       PHYSICAL EXAM:  Physical Exam   Please see progress note dated earlier today. Patient was in rate controlled atrial fibrillation.     DISCHARGE MEDICATIONS:     Your medication list        START taking these medications        Instructions Last Dose Given Next Dose Due   sotalol 80 MG tablet  Commonly known as: BETAPACE      Take 1 tablet by mouth Every 12 (Twelve) Hours.              CHANGE how you take these medications        Instructions Last Dose Given Next Dose Due   rosuvastatin 5 MG tablet  Commonly known as: CRESTOR  What changed: Another medication with the same name was removed. Continue taking this medication, and follow the directions you see here.      Take 2 tablets by mouth Daily.              CONTINUE taking these medications        Instructions Last Dose Given Next Dose Due   allopurinol 100 MG tablet  Commonly known as: ZYLOPRIM      Take 1 tablet by mouth Daily.       apixaban 5 MG tablet tablet  Commonly known as: ELIQUIS      Take 1 tablet by mouth Every 12 (Twelve) Hours.       furosemide 20 MG tablet  Commonly known as: LASIX      Take 1 tablet by mouth Daily As Needed (swelling).       lisinopril 5 MG tablet  Commonly known as: PRINIVIL,ZESTRIL       Take 1 tablet by mouth Daily.       Ozempic (0.25 or 0.5 MG/DOSE) 2 MG/3ML solution pen-injector  Generic drug: Semaglutide(0.25 or 0.5MG/DOS)      Inject 0.25 mg under the skin into the appropriate area as directed 1 (One) Time Per Week.              STOP taking these medications      flecainide 100 MG tablet  Commonly known as: TAMBOCOR        metoprolol succinate XL 25 MG 24 hr tablet  Commonly known as: TOPROL-XL                  Where to Get Your Medications        These medications were sent to [facility] - [place], [place] - [address] [place] [address] - [phone] PH - [phone] [address], [facility] [address]      Phone: [phone]   sotalol 80 MG tablet         DISCHARGE DISPOSITION   Stable    Diet Instructions       Diet: Cardiac Diets, Diabetic Diets; Healthy Heart (2-3 Na+); Thin (IDDSI 0); Consistent Carbohydrate      Discharge Diet:  Cardiac Diets  Diabetic Diets       Cardiac Diet: Healthy Heart (2-3 Na+)    Fluid Consistency: Thin (IDDSI 0)    Diabetic Diet: Consistent Carbohydrate          Activity Instructions       Gradually Increase Activity Until at Pre-Hospitalization Level      Measure Blood Pressure            Future Appointments   Date Time Provider Department Center   2/5/2025 10:45 AM Darian Richard MD [facility]   3/7/2025 10:00 AM Kenn Ospina MD [facility]     Your Scheduled Appointments      Feb 05, 2025 10:45 AM  Follow Up with Darian Richard MD  [facility] CARDIOLOGY ([place]) [address] [place] [address]  [phone]   -Bring photo ID, insurance card, and list of medications to appointment  -If testing was completed outside of [facility] then patient must bring images on a disc  -Copay will be collected at time of appointment  -Established patients should arrive 10 minutes prior to appointment         Mar 07, 2025 10:00 AM  New Patient with Kenn Ospina MD  [facility] CARDIOLOGY  ([place]) [address]  [facility] [address] [phone]   -Bring photo ID, insurance card, and list of medications to appointment  -If testing was completed outside of [facility] then patient must bring images on a disc  -Copay will be collected at time of appointment  -New patients should arrive 15 minutes prior to appointment  If your symptoms change or worsen, please contact your PCP or go to the nearest emergency room              Additional Instructions for the Follow-ups that You Need to Schedule       Discharge Follow-up with PCP   As directed       Currently Documented PCP:    Donte Finney APRN    PCP Phone Number:    [phone]     Follow Up Details: 1 week with CMP; hospital follow up afib with cardioversion        Discharge Follow-up with Specified Provider: Dr Darian Richard 3-4 weeks   As directed      To: Dr Darian Richard 3-4 weeks   Follow Up Details: hospital follow up for afib requiring cardioversion               Follow-up Information       Donte Finney APRN .    Specialty: Family Medicine  Why: 1 week with CMP; hospital follow up afib with cardioversion  Contact information:  [address]  [facility] [address]  [phone]                              TEST  RESULTS PENDING AT DISCHARGE       CODE STATUS  Code Status and Medical Interventions: CPR (Attempt to Resuscitate); Full Support   Ordered at: 12/09/24 0831     Code Status (Patient has no pulse and is not breathing):    CPR (Attempt to Resuscitate)     Medical Interventions (Patient has pulse or is breathing):    Full Support       Juani George DO  12/11/24  15:23 EST    Please note that this discharge summary required less than 30 minutes to complete.    Please send a copy of this dictation to the following providers:  Donte Finney APRN

## 2024-12-11 NOTE — PROGRESS NOTES
[facility]     Progress Note    Patient Name: Coni Tubbs  : 1965  MRN: [id number]  Primary Care Physician:  Donte Finney, LOLIS  Date of admission: 2024    Subjective   Subjective     Chief Complaint: 59-year-old female being seen in follow-up for symptomatic palpitations    History of Present Illness  Patient Reports feeling well today when seen at bedside. She remains in rate controlled A-fib. I saw the patient prior to RYAN/Cardioversion.     No acute overnight events reported to me per staff.    Two of patient's children and her primary RN present at bedside    Review of Systems   Constitutional:  Negative for chills and fever.   Respiratory:  Negative for cough and shortness of breath.    Cardiovascular:  Negative for chest pain and palpitations.   Gastrointestinal:  Negative for vomiting.     Objective   Objective     Vitals:   Temp:  [97.5 °F (36.4 °C)-99.2 °F (37.3 °C)] 98.1 °F (36.7 °C)  Heart Rate:  [72-88] 72  Resp:  [18] 18  BP: ()/(65-93) 140/93    Physical Exam  Constitutional:       General: She is not in acute distress.     Appearance: She is well-developed.   HENT:      Head: Normocephalic and atraumatic.   Eyes:      Conjunctiva/sclera: Conjunctivae normal.   Neck:      Trachea: No tracheal deviation.   Cardiovascular:      Rate and Rhythm: Normal rate. Rhythm irregular.      Pulses:           Dorsalis pedis pulses are 2+ on the right side and 2+ on the left side.      Heart sounds: No murmur heard.     No friction rub. No gallop.      Comments: No significant edema noted   Pulmonary:      Effort: No respiratory distress.      Breath sounds: Normal breath sounds. No wheezing or rales.   Abdominal:      General: Bowel sounds are normal. There is no distension.      Palpations: Abdomen is soft.      Tenderness: There is no abdominal tenderness. There is no guarding.   Musculoskeletal:         General: No tenderness. Normal range of motion.      Right lower leg: No  edema.      Left lower leg: No edema.   Skin:     General: Skin is warm and dry.      Findings: No erythema or rash.   Neurological:      Mental Status: She is alert and oriented to person, place, and time.      Cranial Nerves: No cranial nerve deficit.          Result Review    Result Review:  I have personally reviewed the results from the time of this admission to 12/11/2024 12:50 EST and agree with these findings:  [x]  Laboratory list / accordion  []  Microbiology  []  Radiology  []  EKG/Telemetry   []  Cardiology/Vascular   []  Pathology  []  Old records  []  Other:  Most notable findings include:     Results from last 7 days   Lab Units 12/11/24  0135 12/09/24  0728   WBC 10*3/mm3 9.77 8.81   HEMOGLOBIN g/dL 13.9 14.7   HEMATOCRIT % 40.5 42.8   PLATELETS 10*3/mm3 157 180     Results from last 7 days   Lab Units 12/11/24  0135 12/10/24  0047 12/09/24  0728   SODIUM mmol/L 137 137 137   POTASSIUM mmol/L 4.2 4.2 4.0   CHLORIDE mmol/L 104 106 105   CO2 mmol/L 24.6 24.0 21.3*   BUN mg/dL 16 15 18   CREATININE mg/dL 0.93 1.02* 0.82   CALCIUM mg/dL 8.6 8.6 9.4   BILIRUBIN mg/dL  --   --  0.6   ALK PHOS U/L  --   --  67   ALT (SGPT) U/L  --   --  12   AST (SGOT) U/L  --   --  14   GLUCOSE mg/dL 182* 186* 252*         Assessment & Plan   Assessment / Plan     #Persistent atrial fibrillation with symptomatic palpitations, [id number]  #SUSAN, non-compliant with CPAP  #DM type II complicated by hyperglycemia  #Essential hypertension that is currently uncontrolled  #Hyperlipidemia  #Acute renal insufficiency, resolved  #Morbid obesity with BMI 39.20 kg/m², complicates all aspects of care  #Macrocytosis without anemia; B12 and folate within normal limits  -Continue the sotalol initiation protocol; QTc is acceptable and within normal limits  -Patient NPO with planned RYAN with cardioversion today  -Further medication recommendations pending Cardiology recommendations  -Potassium and magnesium are also well within normal  limits  -Creatinine improved with short course of IV fluids overnight  -Repeat chemistry panel in a.m.  -Place patient on Accu-Cheks and the low-dose sliding scale insulin as needed; continue the same for now    VTE Prophylaxis:  Continue Eliquis    CODE STATUS:    Code Status (Patient has no pulse and is not breathing): CPR (Attempt to Resuscitate)  Medical Interventions (Patient has pulse or is breathing): Full Support    Disposition:  I expect patient to be discharged home when medically stable from cardiac standpoint, suspect in 24 hours.    Juani George, DO

## 2024-12-11 NOTE — ANESTHESIA POSTPROCEDURE EVALUATION
Patient: Coni Tubbs    Procedure Summary       Date: 12/11/24 Room / [facility]    Anesthesia Start: 1244 Anesthesia Stop: 1248    Procedure: CARDIOVERSION EXTERNAL IN CARDIOLOGY DEPARTMENT Diagnosis: (Atrial fibrillation/flutter)    Scheduled Providers: Efe Higgins MD Provider: Jamey Urbina MD    Anesthesia Type: general ASA Status: 3            Anesthesia Type: general    Vitals  No vitals data found for the desired time range.          Post Anesthesia Care and Evaluation    Patient location during evaluation: bedside  Patient participation: complete - patient participated  Level of consciousness: awake and alert  Pain score: 1  Pain management: adequate    Airway patency: patent  Anesthetic complications: No anesthetic complications  PONV Status: controlled  Cardiovascular status: acceptable  Respiratory status: acceptable  Hydration status: acceptable    Comments: 62  98  22  97%  138/86  No anesthesia care post op

## 2024-12-11 NOTE — PLAN OF CARE
Goal Outcome Evaluation:             Patient has been resting this shift. No s/s of acute distress noted at this time. Patient has no complaints currently. Pt NPO at midnight for possible cardioversion in the AM. Plan of care ongoing.

## 2024-12-11 NOTE — PROGRESS NOTES
[facility]  PROGRESS NOTE    Patient information:  Name: Coni Tubbs  Age/Sex: 59 y.o. female  :  1965        PCP: Donte Finney APRN  Attending: Juani George DO  MRN:  [id number]  Visit Number:  [id number]  LOS: 1  CODE STATUS:    Code Status and Medical Interventions: CPR (Attempt to Resuscitate); Full Support   Ordered at: 24 0831     Code Status (Patient has no pulse and is not breathing):    CPR (Attempt to Resuscitate)     Medical Interventions (Patient has pulse or is breathing):    Full Support     PROBLEM LIST:Principal Problem:    A-fib    Reason for Cardiology follow-up: Sotalol loading    Subjective   ADMISSION INFORMATION:  Chief Complaint   Patient presents with    Atrial Fibrillation     DATE:2024    HPI:  Coni Tubbs is a 59 y.o. female with a past medical history significant for atrial fibrillation [id number] 3 on Eliquis, hypertension, hyperlipidemia, DMT2 and SUSAN.      Patient presented to [facility] ([facility] emergency department (ED) on 2024 for sotalol loading.      Primary cardiologist is Dr. Martinez and she was last seen in the office on 2024    Interval History:   Telemetry reveals atrial fibrillation 70s.  AM EKG reveals a QTc interval of 469 ms and a QRS duration of 80 ms.  According to patient's I & O flow chart net balance of +1560 mL with x 6 unmeasured urine occurrences noted overnight. AM labs reveal potassium 4.2, creatinine 0.93, hemoglobin 13.9, and a platelet count of 157.  Patient has been held n.p.o. since midnight for planned cardioversion later today.    Patient was in room 316 A when she was seen and examined.  Patient is lying in bed resting quietly.  No acute distress noted at this time.    EVENT TIMELINE:   : Sotalol started with AM dose today.  Fifth dose is calculated to be administered on Wednesday morning 2024.  : Planning DCCV.     Objective      Vital Signs  Temp:  [97.5 °F (36.4 °C)-99.2 °F (37.3 °C)] 97.5 °F (36.4 °C)  Heart Rate:  [70-88] 85  Resp:  [18] 18  BP: ()/(65-87) 113/76  Device (Oxygen Therapy): room air  Vital Signs (last 72 hrs)         12/08 0700 12/09 0659 12/09 0700  12/10 0659 12/10 0700  12/11 0659 12/11 0700 12/11 0808   Most Recent      Temp (°F)   97.5 -  98.5    97.8 -  99.2      97.5     97.5 (36.4) 12/11 0721    Heart Rate   63 -  92    70 -  88      85     85 12/11 0721    Resp   17 -  20      18      18     18 12/11 0721    BP   91/71 -  138/88    95/65 -  119/81      113/76     113/76 12/11 0721    SpO2 (%)   96 -  97    97 -  98      96     96 12/11 0721          BMI:Body mass index is 39.51 kg/m².    WEIGHT:      12/09/24  1006 12/10/24  0500 12/11/24  0500   Weight: 129 kg (285 lb 7.9 oz) 131 kg (289 lb 0.4 oz) 132 kg (291 lb 4.8 oz)       DIET:NPO Diet NPO Type: Sips with Meds    I&O:  Intake & Output (last 3 days)         12/08 0701 12/09 0700 12/09 0701  12/10 0700 12/10 0701  12/11 0700 12/11 0701  12/12 0700    P.O.  360 1560     I.V. (mL/kg)  100.3 (0.8)      Total Intake(mL/kg)  460.3 (3.5) 1560 (11.8)     Net  +460.3 +1560             Urine Unmeasured Occurrence  4 x 6 x     Stool Unmeasured Occurrence   1 x              PHYSICAL EXAM:  Vitals and nursing note reviewed.   Constitutional:       Appearance: Not in distress.   Neck:      Vascular: No JVR. JVD normal.   Pulmonary:      Effort: Pulmonary effort is normal.      Breath sounds: Normal breath sounds. No wheezing. No rhonchi. No rales.   Cardiovascular:      Normal rate. Irregular rhythm. Normal S1. Normal S2.       Murmurs: There is no murmur.      No gallop.  No click. No rub.   Pulses:     Intact distal pulses.   Edema:     Peripheral edema absent.   Musculoskeletal:      Cervical back: Normal range of motion and neck supple. Skin:     General: Skin is warm and dry.   Neurological:      General: No focal deficit present.      Mental Status:  "Alert and oriented to person, place and time.   Psychiatric:         Behavior: Behavior is cooperative.                  Results review   Results Review:    I have reviewed the patient's new clinical results. 12/11/24 08:08 EST    Results from last 7 days   Lab Units 12/09/24  0922 12/09/24  0728   HSTROP T ng/L 9 10     Lab Results   Component Value Date    PROBNP 741.8 12/09/2024     Results from last 7 days   Lab Units 12/11/24  0135 12/09/24  0728   WBC 10*3/mm3 9.77 8.81   HEMOGLOBIN g/dL 13.9 14.7   PLATELETS 10*3/mm3 157 180     Results from last 7 days   Lab Units 12/11/24  0135 12/10/24  0047 12/09/24  0728   SODIUM mmol/L 137 137 137   POTASSIUM mmol/L 4.2 4.2 4.0   CHLORIDE mmol/L 104 106 105   CO2 mmol/L 24.6 24.0 21.3*   BUN mg/dL 16 15 18   CREATININE mg/dL 0.93 1.02* 0.82   CALCIUM mg/dL 8.6 8.6 9.4   GLUCOSE mg/dL 182* 186* 252*   ALT (SGPT) U/L  --   --  12   AST (SGOT) U/L  --   --  14     Lab Results   Component Value Date    MG 1.8 12/11/2024    MG 2.0 12/10/2024    MG 1.6 12/09/2024     Estimated Creatinine Clearance: 98.2 mL/min (by C-G formula based on SCr of 0.93 mg/dL).    No results found for: \"HGBA1C\"  Lab Results   Component Value Date    CHOL 151 08/01/2017    TRIG 154 (H) 08/01/2017    LDL 91 08/01/2017    HDL 29 (L) 08/01/2017     Lab Results   Component Value Date    ABSOLUTELUNG 34 12/09/2024     No results found for: \"INR\"  No results found for: \"LABHEPA\"    Lab Results   Component Value Date    TSH 2.240 12/09/2024      Pain Management Panel           No data to display              Microbiology Results (last 10 days)       ** No results found for the last 240 hours. **           Imaging Results (Last 24 Hours)       ** No results found for the last 24 hours. **          ECHO:  Results for orders placed during the hospital encounter of 09/20/22    Adult Transthoracic Echo Complete W/ Cont if Necessary Per Protocol    Interpretation Summary  · Technically difficult echo and Doppler " study was obtained.  · Normal left ventricular cavity size noted.  · Left ventricular wall thickness is consistent with mild concentric hypertrophy.  · Left ventricular ejection fraction appears to be 61 - 65%. Left ventricular systolic function is normal.  · Left ventricular diastolic function is consistent with (grade II w/high LAP) pseudonormalization.  · Aortic valve is normal, no aortic stenosis or regurgitation noted.  · Mitral valve appears normal, no mitral stenosis or regurgitation noted.  · Mild tricuspid regurgitation with mild pulmonary hypertension noted.  · No pericardial effusion detected.      STRESS TEST:  Results for orders placed during the hospital encounter of 17    Stress Test With Myocardial Perfusion One Day    Interpretation Summary  · Breast attenuation and GI artifacts are present.  · Left ventricular ejection fraction is hyperdynamic (Calculated EF > 70%).  · Myocardial perfusion imaging indicates a normal myocardial perfusion study with no evidence of ischemia.  · Impressions are consistent with a low risk study.  · Findings consistent with a normal ECG stress test.  · Fixed ant wall defect with good wall motion, probably representing breast attenuation.       HEART CATH:  No results found for this or any previous visit.      QTc interval:        [id number]    Post cardioversion          Prior to cardioversion        TELEMETRY:     Prior to DCCV: Atrial fibrillation 70s            I reviewed the patient's new clinical results.    ALLERGIES: Cozaar [losartan], Levaquin [levofloxacin], Penicillins, Clindamycin/lincomycin, Sulfa antibiotics, and Vancomycin    Medication Review:   Current list of medications may not reflect those currently placed in orders that are not signed or are being held.     allopurinol, 100 mg, Oral, Daily  apixaban, 5 mg, Oral, Q12H  insulin lispro, 2-7 Units, Subcutaneous, 4x Daily AC & at Bedtime  lisinopril, 5 mg, Oral, Daily  magnesium sulfate, 4 g,  Intravenous, Once  rosuvastatin, 10 mg, Oral, Daily  sodium chloride, 10 mL, Intravenous, Q12H  sotalol, 80 mg, Oral, Q12H           Calcium Replacement - Follow Nurse / BPA Driven Protocol    dextrose    dextrose    furosemide    glucagon (human recombinant)    Magnesium Cardiology Dose Replacement - Follow Nurse / BPA Driven Protocol    nitroglycerin    Pharmacy Consult    Phosphorus Replacement - Follow Nurse / BPA Driven Protocol    Potassium Replacement - Follow Nurse / BPA Driven Protocol    [COMPLETED] Insert Peripheral IV **AND** sodium chloride    sodium chloride    sodium chloride      Assessment      Atrial fibrillation.  [id number] is 3 for hypertension, diabetes mellitus and gender.  Hypertension  Diabetes mellitus type 2             Recommendations / Plan     -Successful cardioversion done.  Post cardioversion EKG showing sinus bradycardia.  -Continue Eliquis 5 mg p.o. twice daily and sotalol 80 mg p.o. twice daily.  -Continue Crestor 10 mg p.o. once daily for ASCVD risk reduction.  -Outpatient follow-up with Dr. Dawson scheduled.   -General cardiology will sign off at this time patient can be discharged from cardiology standpoint. .       I have discussed the patients findings and recommendations with the patient.    Thank you very much for asking us to be involved in this patient's care.      Electronically signed by LOLIS Ruffin, 12/11/24, 1:05 PM EST.                   Please note that portions of this note were completed with a voice recognition program.    Please note that portions of this note were copied and has been reviewed and is accurate as of 12/11/2024 .

## 2024-12-11 NOTE — PLAN OF CARE
Goal Outcome Evaluation:   Patient is being discharged home today.

## 2024-12-12 NOTE — PAYOR COMM NOTE
"CONTACT:  MARIA ALEJANDRA LANIER, RN  UTILIZATION MANAGEMENT DEPT.   [facility]   [address], [address]   PHONE:  [phone]   FAX: [phone]       [facility] 12/11/2024---AUTH PENDING    REF # [id number]          Coni Tubbs (59 y.o. Female)       Date of Birth   1965    Social Security Number       Address   [address] [address]    Home Phone   [phone]    MRN   [id number]       [facility]    Marital Status   Unknown                            Admission Date   12/9/24    Admission Type   Emergency    Admitting Provider   Juani George DO    Attending Provider       Department, Room/Bed   [address], 3316[address]       Discharge Date   12/11/2024    Discharge Disposition   Home or Self Care    Discharge Destination   Home                              Attending Provider: (none)   Allergies: Cozaar [Losartan], Levaquin [Levofloxacin], Penicillins, Clindamycin/lincomycin, Sulfa Antibiotics, Vancomycin    Isolation: None   Infection: None   Code Status: Prior    Ht: 182.9 cm (72\")   Wt: 132 kg (291 lb 4.8 oz)    Admission Cmt: None   Principal Problem: A-fib [I48.91]                   Active Insurance as of 12/9/2024       Primary Coverage       Payor Plan Insurance Group Employer/Plan Group    HUMANA MEDICARE REPLACEMENT HUMANA MED ADV SNP PPO [id number]       Payor Plan Address Payor Plan Phone Number Payor Plan Fax Number Effective Dates    [address] [phone]  5/1/2023 - None Entered    [facility] [address]         Subscriber Name Subscriber Birth Date Member ID       CONI TUBBS 1965 [id number]                     Emergency Contacts        (Rel.) Home Phone Work Phone Mobile Phone    Carey Madrigal (Other) [phone] -- [phone]    Daily Madrigal (Other) [phone] -- [phone]                 Discharge Summary        Juani George DO at 12/11/24 [facility]              [facility] " [facility] DISCHARGE SUMMARY    Patient Identification:  Name:  Coni Tubbs  Age:  59 y.o.  Sex:  female  :  1965  MRN:  [id number]  Visit Number:  [id number]    Date of Admission: 2024  Date of Discharge:  2024    PCP: Donte Finney APRN    DISCHARGE DIAGNOSIS  #Persistent atrial fibrillation with symptomatic palpitations, KCN4TP9-DOSs 2  #SUSAN, noncompliant with CPAP  #Essential hypertension that is currently controlled  #Diabetes mellitus type 2 complicated by hyperglycemia  #Hyperlipidemia  #Macrocytosis without anemia; B12 and folate levels within normal limits  #Acute renal insufficiency, resolved  #Morbid obesity with BMI 39.51 kg/m², complicates all aspects of care    CONSULTS   Cardiology    PROCEDURES PERFORMED  2024: Transesophageal echocardiogram with cardioversion    HOSPITAL COURSE  Patient is a 59 y.o. female presented to [facility] complaining of intermittent palpitations, failure of flecanide and wishes to begin sotalol.  Please see the admitting history and physical for further details.      Patient had been evaluated by Cardiology on 24 in the outpatient setting with a planned inpatient admission for sotalol initiation on 2024.  Patient was admitted to the hospital medicine service and underwent basic laboratory studies in the emergency department.  The patient's potassium was well within normal limits at 4.0.  Her magnesium was borderline low at 1.6 and she was started on the magnesium supplementation protocol.    Patient was started on the sotalol initiation protocol.  Patient had serial EKGs per protocol to monitor her QT intervals. Cardiology was consulted early on during her hospitalization. She was continued on her home Eliquis.  Unfortunately, the patient remained in atrial fibrillation although the majority was rate controlled. Cardiology decided to proceed with transesophageal echocardiogram with cardioversion. Patient was  converted to sinus rhythm/sinus bradycardia on 12/11/24. Patient tolerated procedure well without evidence of postprocedural complications. Patient deemed stable for discharge home per Cardiology. Medication changes/additions as noted above. I have requested close follow up with her primary care provider and her primary cardiologist.        VITAL SIGNS:  Temp:  [97.5 °F (36.4 °C)-99.2 °F (37.3 °C)] 98.1 °F (36.7 °C)  Heart Rate:  [59-88] 60  Resp:  [16-18] 16  BP: ()/(65-93) 120/86  SpO2:  [92 %-100 %] 99 %  on   ;   Device (Oxygen Therapy): room air    Body mass index is 39.51 kg/m².  Wt Readings from Last 3 Encounters:   12/11/24 132 kg (291 lb 4.8 oz)   12/05/24 127 kg (279 lb)   11/21/24 128 kg (282 lb)       PHYSICAL EXAM:  Physical Exam   Please see progress note dated earlier today. Patient was in rate controlled atrial fibrillation.     DISCHARGE MEDICATIONS:     Your medication list        START taking these medications        Instructions Last Dose Given Next Dose Due   sotalol 80 MG tablet  Commonly known as: BETAPACE      Take 1 tablet by mouth Every 12 (Twelve) Hours.              CHANGE how you take these medications        Instructions Last Dose Given Next Dose Due   rosuvastatin 5 MG tablet  Commonly known as: CRESTOR  What changed: Another medication with the same name was removed. Continue taking this medication, and follow the directions you see here.      Take 2 tablets by mouth Daily.              CONTINUE taking these medications        Instructions Last Dose Given Next Dose Due   allopurinol 100 MG tablet  Commonly known as: ZYLOPRIM      Take 1 tablet by mouth Daily.       apixaban 5 MG tablet tablet  Commonly known as: ELIQUIS      Take 1 tablet by mouth Every 12 (Twelve) Hours.       furosemide 20 MG tablet  Commonly known as: LASIX      Take 1 tablet by mouth Daily As Needed (swelling).       lisinopril 5 MG tablet  Commonly known as: PRINIVIL,ZESTRIL      Take 1 tablet by mouth  Daily.       Ozempic (0.25 or 0.5 MG/DOSE) 2 MG/3ML solution pen-injector  Generic drug: Semaglutide(0.25 or 0.5MG/DOS)      Inject 0.25 mg under the skin into the appropriate area as directed 1 (One) Time Per Week.              STOP taking these medications      flecainide 100 MG tablet  Commonly known as: TAMBOCOR        metoprolol succinate XL 25 MG 24 hr tablet  Commonly known as: TOPROL-XL                  Where to Get Your Medications        These medications were sent to [facility] - [place], [place] - [address] - [phone] PH - [phone]   [address] [place] [address], [place] [address]      Phone: [phone]   sotalol 80 MG tablet         DISCHARGE DISPOSITION   Stable    Diet Instructions       Diet: Cardiac Diets, Diabetic Diets; Healthy Heart (2-3 Na+); Thin (IDDSI 0); Consistent Carbohydrate      Discharge Diet:  Cardiac Diets  Diabetic Diets       Cardiac Diet: Healthy Heart (2-3 Na+)    Fluid Consistency: Thin (IDDSI 0)    Diabetic Diet: Consistent Carbohydrate          Activity Instructions       Gradually Increase Activity Until at Pre-Hospitalization Level      Measure Blood Pressure            Future Appointments   Date Time Provider Department Center   2/5/2025 10:45 AM Darian Richard MD [facility] COR   3/7/2025 10:00 AM Kenn Ospina MD [facility] COR     Your Scheduled Appointments      Feb 05, 2025 10:45 AM  Follow Up with Darian Richard MD  [facility]  [facility] [id number]  [phone]   -Bring photo ID, insurance card, and list of medications to appointment  -If testing was completed outside of [facility] then patient must bring images on a disc  -Copay will be collected at time of appointment  -Established patients should arrive 10 minutes prior to appointment         Mar 07, 2025 10:00 AM  New Patient with Kenn Ospina MD  [facility] CARDIOLOGY ([place]) [address]  [facility] [id number] [phone]   -Bring photo ID, insurance card, and list of medications to appointment  -If testing was completed outside of [facility] then patient must bring images on a disc  -Copay will be collected at time of appointment  -New patients should arrive 15 minutes prior to appointment  If your symptoms change or worsen, please contact your PCP or go to the nearest emergency room              Additional Instructions for the Follow-ups that You Need to Schedule       Discharge Follow-up with PCP   As directed       Currently Documented PCP:    Donte Finney APRN    PCP Phone Number:    [phone]     Follow Up Details: 1 week with CMP; hospital follow up afib with cardioversion        Discharge Follow-up with Specified Provider: Dr Darian Richard 3-4 weeks   As directed      To: Dr Darian Richard 3-4 weeks   Follow Up Details: hospital follow up for afib requiring cardioversion               Follow-up Information       Donte Finney APRN .    Specialty: Family Medicine  Why: 1 week with CMP; hospital follow up afib with cardioversion  Contact information:  [address]  [facility] [address]  [phone]                              TEST  RESULTS PENDING AT DISCHARGE       CODE STATUS  Code Status and Medical Interventions: CPR (Attempt to Resuscitate); Full Support   Ordered at: 12/09/24 0831     Code Status (Patient has no pulse and is not breathing):    CPR (Attempt to Resuscitate)     Medical Interventions (Patient has pulse or is breathing):    Full Support       Juani George DO  12/11/24  15:23 EST    Please note that this discharge summary required less than 30 minutes to complete.    Please send a copy of this dictation to the following providers:  Donte Finney APRN    Electronically signed by Juani George DO at 12/11/24 1620       Discharge Order (From admission, onward)       Start     Ordered    12/11/24 5864   Discharge patient  Once        Expected Discharge Date: 12/11/24   Discharge Disposition: Home or Self Care   Physician of Record for Attribution - Please select from Treatment Team: BRIANA ODOM [[id number]]   Review needed by CMO to determine Physician of Record: No      Question Answer Comment   Physician of Record for Attribution - Please select from Treatment Team BRIANA ODOM    Review needed by CMO to determine Physician of Record No        12/11/24 1522

## 2024-12-30 RX ORDER — METOPROLOL SUCCINATE 25 MG/1
50 TABLET, EXTENDED RELEASE ORAL DAILY
Qty: 180 TABLET | Refills: 1 | OUTPATIENT
Start: 2024-12-30

## 2024-12-30 RX ORDER — ROSUVASTATIN CALCIUM 5 MG/1
5 TABLET, COATED ORAL DAILY
Qty: 90 TABLET | Refills: 3 | Status: SHIPPED | OUTPATIENT
Start: 2024-12-30

## 2025-01-02 NOTE — PROGRESS NOTES
Pt came for appt today however refused to be seen for follow up due to insurance issues and rescheduled appt for next week. She requested samples for Eliquis which were provided.

## 2025-01-28 RX ORDER — APIXABAN 5 MG/1
5 TABLET, FILM COATED ORAL
Qty: 60 TABLET | Refills: 1 | Status: SHIPPED | OUTPATIENT
Start: 2025-01-28

## 2025-02-05 ENCOUNTER — OFFICE VISIT (OUTPATIENT)
Dept: CARDIOLOGY | Facility: CLINIC | Age: 60
End: 2025-02-05
Payer: MEDICARE

## 2025-02-05 VITALS
BODY MASS INDEX: 37.93 KG/M2 | HEART RATE: 77 BPM | OXYGEN SATURATION: 99 % | DIASTOLIC BLOOD PRESSURE: 98 MMHG | HEIGHT: 72 IN | SYSTOLIC BLOOD PRESSURE: 146 MMHG | WEIGHT: 280 LBS

## 2025-02-05 DIAGNOSIS — E66.9 OBESITY (BMI 35.0-39.9 WITHOUT COMORBIDITY): ICD-10-CM

## 2025-02-05 DIAGNOSIS — Z79.01 CHRONIC ANTICOAGULATION: ICD-10-CM

## 2025-02-05 DIAGNOSIS — I10 ESSENTIAL HYPERTENSION: ICD-10-CM

## 2025-02-05 DIAGNOSIS — I48.19 ATRIAL FIBRILLATION, PERSISTENT: Primary | ICD-10-CM

## 2025-02-05 DIAGNOSIS — E78.2 MIXED HYPERLIPIDEMIA: ICD-10-CM

## 2025-02-05 RX ORDER — ROSUVASTATIN CALCIUM 5 MG/1
5 TABLET, COATED ORAL DAILY
Qty: 90 TABLET | Refills: 1 | Status: SHIPPED | OUTPATIENT
Start: 2025-02-05

## 2025-02-05 RX ORDER — SOTALOL HYDROCHLORIDE 80 MG/1
80 TABLET ORAL EVERY 12 HOURS SCHEDULED
Qty: 60 TABLET | Refills: 2 | Status: SHIPPED | OUTPATIENT
Start: 2025-02-05

## 2025-02-05 RX ORDER — LISINOPRIL 5 MG/1
5 TABLET ORAL DAILY
Qty: 90 TABLET | Refills: 1 | Status: SHIPPED | OUTPATIENT
Start: 2025-02-05

## 2025-02-05 RX ORDER — APIXABAN 5 MG/1
5 TABLET, FILM COATED ORAL
Qty: 60 TABLET | Refills: 1 | OUTPATIENT
Start: 2025-02-05

## 2025-02-05 NOTE — PROGRESS NOTES
subjective     Chief Complaint   Patient presents with    Hypertension     Has been out of all meds x 2 weeks        Problems Addressed This Visit  1. Atrial fibrillation, persistent    2. Essential hypertension    3. Mixed hyperlipidemia    4. Obesity (BMI 35.0-39.9 without comorbidity)    5. Chronic anticoagulation with Eliquis        History of Present Illness    Patient is 59 years old white female who has history of persistent atrial fibrillation status post external cardioversion December 2024  She has been maintaining sinus rhythm on sotalol therapy however she ran out of it 2 weeks ago and has not been taking any sotalol fortunately so far she is maintaining sinus rhythm.    She also stopped her Eliquis because she ran out.  She did not call for any samples.  It was explained to the patient's that if she did not have any Eliquis we can always give her samples.    Hypertension has been well-controlled with lisinopril however she has not taken any lisinopril for last 2 weeks.  Refills will be given.    Hyperlipidemia on Crestor 5 mg daily.  Patient is overweight and has not lost any weight.      Past Surgical History:   Procedure Laterality Date    CARDIOVASCULAR STRESS TEST  08/02/2017    ECHO - CONVERTED  07/05/2017    EYE SURGERY      KNEE ARTHROSCOPY Right 2004    SKIN BIOPSY      SKIN CANCER EXCISION Left 12/2017     Family History   Problem Relation Age of Onset    Heart attack Mother     Heart disease Mother     Diabetes Mother     Kidney failure Mother     Obesity Mother     Kidney disease Father     COPD Father     Stroke Sister     Hypertension Sister     COPD Brother     Heart disease Sister     Heart attack Sister     Heart failure Sister      Past Medical History:   Diagnosis Date    Cancer     Diabetes mellitus     Elevated cholesterol     Emphysema, unspecified     Hypertension     Restless leg syndrome     Sleep apnea      Patient Active Problem List   Diagnosis    Chest pain in adult     Essential hypertension    Bilateral leg edema    Gouty arthritis    Obstructive sleep apnea    MORENO (dyspnea on exertion)    Palpitations    Chronic anticoagulation with Eliquis    Mixed hyperlipidemia    Type 2 diabetes mellitus without complication, without long-term current use of insulin    Obesity (BMI 35.0-39.9 without comorbidity)    Atrial fibrillation, persistent status post external cardioversion December 2024       Social History     Tobacco Use    Smoking status: Never     Passive exposure: Never    Smokeless tobacco: Never   Vaping Use    Vaping status: Never Used   Substance Use Topics    Alcohol use: No     Comment:  occ  3-4 times a year    Drug use: No       Allergies   Allergen Reactions    Cozaar [Losartan] Hives    Levaquin [Levofloxacin] Hives    Penicillins Other (See Comments)     Unknown     Clindamycin/Lincomycin Itching and Rash    Sulfa Antibiotics Itching and Rash    Vancomycin Itching and Rash       Current Outpatient Medications on File Prior to Visit   Medication Sig    allopurinol (ZYLOPRIM) 100 MG tablet Take 1 tablet by mouth Daily. (Patient not taking: Reported on 2/5/2025)    furosemide (LASIX) 20 MG tablet Take 1 tablet by mouth Daily As Needed (swelling). (Patient not taking: Reported on 2/5/2025)    Ozempic, 0.25 or 0.5 MG/DOSE, 2 MG/3ML solution pen-injector Inject 0.25 mg under the skin into the appropriate area as directed 1 (One) Time Per Week. (Patient not taking: Reported on 2/5/2025)    [DISCONTINUED] Eliquis 5 MG tablet tablet TAKE ONE TABLET BY MOUTH EVERY 12 HOURS (Patient not taking: Reported on 2/5/2025)    [DISCONTINUED] lisinopril (PRINIVIL,ZESTRIL) 5 MG tablet Take 1 tablet by mouth Daily. (Patient not taking: Reported on 2/5/2025)    [DISCONTINUED] rosuvastatin (CRESTOR) 5 MG tablet TAKE ONE TABLET BY MOUTH EVERY DAY (Patient not taking: Reported on 2/5/2025)    [DISCONTINUED] sotalol (BETAPACE) 80 MG tablet Take 1 tablet by mouth Every 12 (Twelve) Hours.  (Patient not taking: Reported on 2/5/2025)     No current facility-administered medications on file prior to visit.     (Not in a hospital admission)      Results for orders placed during the hospital encounter of 09/20/22    Adult Transthoracic Echo Complete W/ Cont if Necessary Per Protocol    Interpretation Summary  · Technically difficult echo and Doppler study was obtained.  · Normal left ventricular cavity size noted.  · Left ventricular wall thickness is consistent with mild concentric hypertrophy.  · Left ventricular ejection fraction appears to be 61 - 65%. Left ventricular systolic function is normal.  · Left ventricular diastolic function is consistent with (grade II w/high LAP) pseudonormalization.  · Aortic valve is normal, no aortic stenosis or regurgitation noted.  · Mitral valve appears normal, no mitral stenosis or regurgitation noted.  · Mild tricuspid regurgitation with mild pulmonary hypertension noted.  · No pericardial effusion detected.    Results for orders placed during the hospital encounter of 08/01/17    Stress Test With Myocardial Perfusion One Day    Interpretation Summary  · Breast attenuation and GI artifacts are present.  · Left ventricular ejection fraction is hyperdynamic (Calculated EF > 70%).  · Myocardial perfusion imaging indicates a normal myocardial perfusion study with no evidence of ischemia.  · Impressions are consistent with a low risk study.  · Findings consistent with a normal ECG stress test.  · Fixed ant wall defect with good wall motion, probably representing breast attenuation.          The following portions of the patient's history were reviewed and updated as appropriate: allergies, current medications, past family history, past medical history, past social history, past surgical history and problem list.    Review of Systems   Constitutional: Negative.   HENT: Negative.  Negative for congestion.    Eyes: Negative.    Cardiovascular: Negative.  Negative for chest  "pain, cyanosis, dyspnea on exertion, irregular heartbeat, leg swelling, near-syncope, orthopnea, palpitations, paroxysmal nocturnal dyspnea and syncope.   Respiratory: Negative.  Negative for shortness of breath.    Hematologic/Lymphatic: Negative.    Musculoskeletal: Negative.    Gastrointestinal: Negative.    Neurological: Negative.  Negative for headaches.          Objective:     /98 (BP Location: Left arm, Patient Position: Sitting, Cuff Size: Adult)   Pulse 77   Ht 182.9 cm (72\")   Wt 127 kg (280 lb)   SpO2 99%   BMI 37.97 kg/m²   Pulmonary:      Effort: Pulmonary effort is normal.      Breath sounds: Normal breath sounds. No stridor. No wheezing. No rhonchi. No rales.   Cardiovascular:      PMI at left midclavicular line. Normal rate. Regular rhythm. Normal S1. Normal S2.       Murmurs: There is no murmur.      No gallop.  No click. No rub.   Pulses:     Intact distal pulses.   Edema:     Peripheral edema absent.           Lab Review  Lab Results   Component Value Date     12/11/2024    K 4.2 12/11/2024     12/11/2024    BUN 16 12/11/2024    CREATININE 0.93 12/11/2024    GLUCOSE 182 (H) 12/11/2024    CALCIUM 8.6 12/11/2024    ALT 12 12/09/2024    ALKPHOS 67 12/09/2024     No results found for: \"CKTOTAL\"  Lab Results   Component Value Date    WBC 9.77 12/11/2024    HGB 13.9 12/11/2024    HCT 40.5 12/11/2024     12/11/2024     No results found for: \"INR\"  Lab Results   Component Value Date    MG 1.8 12/11/2024     Lab Results   Component Value Date    TSH 2.240 12/09/2024     No results found for: \"BNP\"  Lab Results   Component Value Date    CHOL 151 08/01/2017    TRIG 154 (H) 08/01/2017    HDL 29 (L) 08/01/2017    VLDL 30.8 08/01/2017    LDL 91 08/01/2017     Lab Results   Component Value Date    LDL 91 08/01/2017     Lab Results   Component Value Date    PROBNP 741.8 12/09/2024               Procedures       I personally viewed and interpreted the patient's LAB data     "     Assessment:     1. Atrial fibrillation, persistent    2. Essential hypertension    3. Mixed hyperlipidemia    4. Obesity (BMI 35.0-39.9 without comorbidity)    5. Chronic anticoagulation with Eliquis          Plan:     Persistent atrial fibrillation status post external cardioversion December 2024.  She ran out of her medications 2 weeks ago and has not been taking any sotalol or Eliquis.  Fortunately she is maintaining sinus rhythm.  Sotalol and Eliquis were represcribed.  It was emphasized to the patient the importance of medications.  It was stressed that without sotalol she might go back into A-fib and will require cardioversion again.    With her Eliquis possibility of stroke was again discussed.  She will start Eliquis today.    Hypertension uncontrolled because she ran out of blood pressure medication today she was advised to restart lisinopril and check her blood pressure closely.  Salt restriction weight loss also emphasized.    Follow-up scheduled        No follow-ups on file.

## 2025-02-05 NOTE — LETTER
February 5, 2025     LOLIS Kong  [address]  [facility] [address]    Patient: Coni Tubbs   YOB: 1965   Date of Visit: 2/5/2025       Dear LOLIS Kong    Coni Tubbs was in my office today. Below is a copy of my note.    If you have questions, please do not hesitate to call me. I look forward to following Coni along with you.         Sincerely,        Darian Richard MD        CC: Simeon Aldrich Sr., DMD    subjective     Chief Complaint   Patient presents with   • Hypertension     Has been out of all meds x 2 weeks        Problems Addressed This Visit  No diagnosis found.    History of Present Illness          Past Surgical History:   Procedure Laterality Date   • CARDIOVASCULAR STRESS TEST  08/02/2017   • ECHO - CONVERTED  07/05/2017   • EYE SURGERY     • KNEE ARTHROSCOPY Right 2004   • SKIN BIOPSY     • SKIN CANCER EXCISION Left 12/2017     Family History   Problem Relation Age of Onset   • Heart attack Mother    • Heart disease Mother    • Diabetes Mother    • Kidney failure Mother    • Obesity Mother    • Kidney disease Father    • COPD Father    • Stroke Sister    • Hypertension Sister    • COPD Brother    • Heart disease Sister    • Heart attack Sister    • Heart failure Sister      Past Medical History:   Diagnosis Date   • Cancer    • Diabetes mellitus    • Elevated cholesterol    • Emphysema, unspecified    • Hypertension    • Restless leg syndrome    • Sleep apnea      Patient Active Problem List   Diagnosis   • Chest pain in adult   • Essential hypertension   • Bilateral leg edema   • Gouty arthritis   • Obstructive sleep apnea   • MORENO (dyspnea on exertion)   • Palpitations   • Chronic anticoagulation with Eliquis   • Mixed hyperlipidemia   • Type 2 diabetes mellitus without complication, without long-term current use of insulin   • Obesity (BMI 35.0-39.9 without comorbidity)   • Atrial fibrillation, persistent       Social History     Tobacco  Use   • Smoking status: Never     Passive exposure: Never   • Smokeless tobacco: Never   Vaping Use   • Vaping status: Never Used   Substance Use Topics   • Alcohol use: No     Comment:  occ  3-4 times a year   • Drug use: No       Allergies   Allergen Reactions   • Cozaar [Losartan] Hives   • Levaquin [Levofloxacin] Hives   • Penicillins Other (See Comments)     Unknown    • Clindamycin/Lincomycin Itching and Rash   • Sulfa Antibiotics Itching and Rash   • Vancomycin Itching and Rash       Current Outpatient Medications on File Prior to Visit   Medication Sig   • allopurinol (ZYLOPRIM) 100 MG tablet Take 1 tablet by mouth Daily. (Patient not taking: Reported on 2/5/2025)   • Eliquis 5 MG tablet tablet TAKE ONE TABLET BY MOUTH EVERY 12 HOURS (Patient not taking: Reported on 2/5/2025)   • furosemide (LASIX) 20 MG tablet Take 1 tablet by mouth Daily As Needed (swelling). (Patient not taking: Reported on 2/5/2025)   • lisinopril (PRINIVIL,ZESTRIL) 5 MG tablet Take 1 tablet by mouth Daily. (Patient not taking: Reported on 2/5/2025)   • Ozempic, 0.25 or 0.5 MG/DOSE, 2 MG/3ML solution pen-injector Inject 0.25 mg under the skin into the appropriate area as directed 1 (One) Time Per Week. (Patient not taking: Reported on 2/5/2025)   • rosuvastatin (CRESTOR) 5 MG tablet TAKE ONE TABLET BY MOUTH EVERY DAY (Patient not taking: Reported on 2/5/2025)   • sotalol (BETAPACE) 80 MG tablet Take 1 tablet by mouth Every 12 (Twelve) Hours. (Patient not taking: Reported on 2/5/2025)     No current facility-administered medications on file prior to visit.     (Not in a hospital admission)      Results for orders placed during the hospital encounter of 09/20/22    Adult Transthoracic Echo Complete W/ Cont if Necessary Per Protocol    Interpretation Summary  · Technically difficult echo and Doppler study was obtained.  · Normal left ventricular cavity size noted.  · Left ventricular wall thickness is consistent with mild concentric  "hypertrophy.  · Left ventricular ejection fraction appears to be 61 - 65%. Left ventricular systolic function is normal.  · Left ventricular diastolic function is consistent with (grade II w/high LAP) pseudonormalization.  · Aortic valve is normal, no aortic stenosis or regurgitation noted.  · Mitral valve appears normal, no mitral stenosis or regurgitation noted.  · Mild tricuspid regurgitation with mild pulmonary hypertension noted.  · No pericardial effusion detected.    Results for orders placed during the hospital encounter of 08/01/17    Stress Test With Myocardial Perfusion One Day    Interpretation Summary  · Breast attenuation and GI artifacts are present.  · Left ventricular ejection fraction is hyperdynamic (Calculated EF > 70%).  · Myocardial perfusion imaging indicates a normal myocardial perfusion study with no evidence of ischemia.  · Impressions are consistent with a low risk study.  · Findings consistent with a normal ECG stress test.  · Fixed ant wall defect with good wall motion, probably representing breast attenuation.          The following portions of the patient's history were reviewed and updated as appropriate: allergies, current medications, past family history, past medical history, past social history, past surgical history and problem list.    ROS       Objective:     /98 (BP Location: Left arm, Patient Position: Sitting, Cuff Size: Adult)   Pulse 77   Ht 182.9 cm (72\")   Wt 127 kg (280 lb)   SpO2 99%   BMI 37.97 kg/m²   Physical Exam      Lab Review  Lab Results   Component Value Date     12/11/2024    K 4.2 12/11/2024     12/11/2024    BUN 16 12/11/2024    CREATININE 0.93 12/11/2024    GLUCOSE 182 (H) 12/11/2024    CALCIUM 8.6 12/11/2024    ALT 12 12/09/2024    ALKPHOS 67 12/09/2024     No results found for: \"CKTOTAL\"  Lab Results   Component Value Date    WBC 9.77 12/11/2024    HGB 13.9 12/11/2024    HCT 40.5 12/11/2024     12/11/2024     No results " "found for: \"INR\"  Lab Results   Component Value Date    MG 1.8 12/11/2024     Lab Results   Component Value Date    TSH 2.240 12/09/2024     No results found for: \"BNP\"  Lab Results   Component Value Date    CHOL 151 08/01/2017    TRIG 154 (H) 08/01/2017    HDL 29 (L) 08/01/2017    VLDL 30.8 08/01/2017    LDL 91 08/01/2017     Lab Results   Component Value Date    LDL 91 08/01/2017     Lab Results   Component Value Date    PROBNP 741.8 12/09/2024               Procedures       I personally viewed and interpreted the patient's LAB data         Assessment:   No diagnosis found.      Plan:              No follow-ups on file.     "

## 2025-04-04 ENCOUNTER — OFFICE VISIT (OUTPATIENT)
Dept: CARDIOLOGY | Facility: CLINIC | Age: 60
End: 2025-04-04
Payer: MEDICARE

## 2025-04-04 VITALS
HEIGHT: 72 IN | BODY MASS INDEX: 36.98 KG/M2 | SYSTOLIC BLOOD PRESSURE: 136 MMHG | OXYGEN SATURATION: 96 % | WEIGHT: 273 LBS | HEART RATE: 91 BPM | DIASTOLIC BLOOD PRESSURE: 100 MMHG

## 2025-04-04 DIAGNOSIS — I10 ESSENTIAL HYPERTENSION: ICD-10-CM

## 2025-04-04 DIAGNOSIS — E66.9 OBESITY (BMI 35.0-39.9 WITHOUT COMORBIDITY): ICD-10-CM

## 2025-04-04 DIAGNOSIS — I48.19 ATRIAL FIBRILLATION, PERSISTENT: Primary | ICD-10-CM

## 2025-04-04 RX ORDER — FLUTICASONE PROPIONATE 50 MCG
1 SPRAY, SUSPENSION (ML) NASAL 2 TIMES DAILY
COMMUNITY

## 2025-04-04 RX ORDER — SOTALOL HYDROCHLORIDE 120 MG/1
120 TABLET ORAL EVERY 12 HOURS SCHEDULED
Qty: 60 TABLET | Refills: 3 | Status: SHIPPED | OUTPATIENT
Start: 2025-04-04

## 2025-04-10 DIAGNOSIS — I48.19 ATRIAL FIBRILLATION, PERSISTENT: Primary | ICD-10-CM

## 2025-04-10 NOTE — PROGRESS NOTES
Cardiac Electrophysiology Outpatient Note  [facility]    Office Visit     Coni Tubbs  [id number]  04/04/2025    Primary Care Physician: Donte Finney APRN    Referred By: Darian Richard,*    Subjective     Chief Complaint   Patient presents with    PAF       History of Present Illness:   Coni Tubbs is a 59 y.o. female who presents to my electrophysiology clinic for evaluation of atrial fibrillation.  She believes she was first diagnosed with atrial fibrillation in 2022.  At that point she felt fluttering in her chest.  She initially was treated with flecainide.  She believes this initially did well, but eventually this was stopped.  She was started on sotalol in December and underwent cardioversion for persistent atrial fibrillation.  More recently she has run out of all of her medicines for couple weeks.  She is back in atrial fibrillation today.  Overall she says she feels okay but does have fatigue.  Says she does not sleep well, often due to back pain.  No significant shortness of breath.  No palpitations.  She has a history of sleep apnea but did not tolerate CPAP.  Occasional alcohol use.  Has had significant weight loss, but wants to lose more.  Past Medical History:   Diagnosis Date    Cancer     Diabetes mellitus     Elevated cholesterol     Emphysema, unspecified     Hypertension     Restless leg syndrome     Sleep apnea        Past Surgical History:   Procedure Laterality Date    CARDIOVASCULAR STRESS TEST  08/02/2017    CATARACT EXTRACTION Left     ECHO - CONVERTED  07/05/2017    EYE SURGERY      KNEE ARTHROSCOPY Right 2004    SKIN BIOPSY      SKIN CANCER EXCISION Left 12/2017       Family History   Problem Relation Age of Onset    Heart attack Mother     Heart disease Mother     Diabetes Mother     Kidney failure Mother     Obesity Mother     Kidney disease Father     COPD Father     Stroke Sister     Hypertension  "Sister     COPD Brother     Heart disease Sister     Heart attack Sister     Heart failure Sister        Social History     Socioeconomic History    Marital status: Unknown   Tobacco Use    Smoking status: Never     Passive exposure: Never    Smokeless tobacco: Never   Vaping Use    Vaping status: Never Used   Substance and Sexual Activity    Alcohol use: Yes     Comment:  occ  3-4 times a year    Drug use: No    Sexual activity: Not Currently     Partners: Male     Birth control/protection: Abstinence         Current Outpatient Medications:     allopurinol (ZYLOPRIM) 100 MG tablet, Take 1 tablet by mouth Daily., Disp: , Rfl: 2    apixaban (Eliquis) 5 MG tablet tablet, Take 1 tablet by mouth Every 12 (Twelve) Hours., Disp: 60 tablet, Rfl: 2    fluticasone (FLONASE) 50 MCG/ACT nasal spray, 1 spray by Each Nare route 2 (Two) Times a Day., Disp: , Rfl:     lisinopril (PRINIVIL,ZESTRIL) 5 MG tablet, Take 1 tablet by mouth Daily. (Patient taking differently: Take 1 tablet by mouth As Needed. Every other day), Disp: 90 tablet, Rfl: 1    Ozempic, 0.25 or 0.5 MG/DOSE, 2 MG/3ML solution pen-injector, Inject 0.25 mg under the skin into the appropriate area as directed 1 (One) Time Per Week., Disp: , Rfl:     rosuvastatin (CRESTOR) 5 MG tablet, Take 1 tablet by mouth Daily., Disp: 90 tablet, Rfl: 1    sotalol (BETAPACE) 120 MG tablet, Take 1 tablet by mouth Every 12 (Twelve) Hours., Disp: 60 tablet, Rfl: 3    furosemide (LASIX) 20 MG tablet, Take 1 tablet by mouth Daily As Needed (swelling). (Patient not taking: Reported on 2/5/2025), Disp: , Rfl:     Allergies:   Allergies   Allergen Reactions    Cozaar [Losartan] Hives    Levaquin [Levofloxacin] Hives    Penicillins Other (See Comments)     Unknown     Clindamycin/Lincomycin Itching and Rash    Sulfa Antibiotics Itching and Rash    Vancomycin Itching and Rash       Objective   Vital Signs: Blood pressure 136/100, pulse 91, height 184.2 cm (72.5\"), weight 124 kg (273 lb), " "SpO2 96%.    PHYSICAL EXAM  General appearance: Awake, alert, cooperative  Head: Normocephalic, without obvious abnormality, atraumatic  Neck: No JVD  Lungs: Clear to ascultation bilaterally  Heart: Regular rate and rhythm, no murmurs, 2+ LE pulses, no lower extremity swelling  Skin: Skin color, turgor normal, no rashes or lesions  Neurologic: Grossly normal     Lab Results   Component Value Date    GLUCOSE 182 (H) 12/11/2024    CALCIUM 8.6 12/11/2024     12/11/2024    K 4.2 12/11/2024    CO2 24.6 12/11/2024     12/11/2024    BUN 16 12/11/2024    CREATININE 0.93 12/11/2024    EGFRIFNONA 61 08/01/2017    BCR 17.2 12/11/2024    ANIONGAP 8.4 12/11/2024     Lab Results   Component Value Date    WBC 9.77 12/11/2024    HGB 13.9 12/11/2024    HCT 40.5 12/11/2024    .0 (H) 12/11/2024     12/11/2024     No results found for: \"INR\", \"PROTIME\"  Lab Results   Component Value Date    TSH 2.240 12/09/2024          Results for orders placed during the hospital encounter of 09/20/22    Adult Transthoracic Echo Complete W/ Cont if Necessary Per Protocol    Interpretation Summary  · Technically difficult echo and Doppler study was obtained.  · Normal left ventricular cavity size noted.  · Left ventricular wall thickness is consistent with mild concentric hypertrophy.  · Left ventricular ejection fraction appears to be 61 - 65%. Left ventricular systolic function is normal.  · Left ventricular diastolic function is consistent with (grade II w/high LAP) pseudonormalization.  · Aortic valve is normal, no aortic stenosis or regurgitation noted.  · Mitral valve appears normal, no mitral stenosis or regurgitation noted.  · Mild tricuspid regurgitation with mild pulmonary hypertension noted.  · No pericardial effusion detected.         I personally viewed and interpreted the patient's EKG/Telemetry/lab data    Procedures    Coni Tubbs  reports that she has never smoked. She has never been exposed to " tobacco smoke. She has never used smokeless tobacco.        Advance Care Planning   Advance Care Planning: ACP discussion was held with the patient during this visit. Patient does not have an advance directive, information provided.     Assessment & Plan    1. Atrial fibrillation, persistent status post external cardioversion December 2024  Patient is referred for persistent atrial fibrillation.  She underwent a cardioversion after starting sotalol 80 mg twice daily in December.  She did run out of her medicines at 1 point and is back in atrial fibrillation now.      We discussed the pathophysiology of atrial fibrillation as well as treatment options going forward.  This included discussion of antiarrhythmic therapy as well as catheter ablation.  For the latter we discussed how the procedures performed including the likely of success and potential risks.  We also discussed the options of increasing her sotalol or switching to another medicine such as Tikosyn.    After this discussion she would like to trial increasing sotalol to 120 mg twice daily and planning on repeat cardioversion in 1 to 2 weeks.  Will see if we can schedule this in [place].  Continue on Eliquis.    2. Essential hypertension  Blood pressure is currently borderline elevated.  At home it seems to be well-controlled.  Will have her create a home blood pressure log    3. Obesity (BMI 35.0-39.9 without comorbidity)  BMI today is 36.5.  We discussed the importance of weight loss and reducing the risk of further episodes of atrial fibrillation..    4.  SUSAN  History of sleep apnea but has not been able to tolerate CPAP.  We discussed the relationship with this and atrial fibrillation.       Follow Up:  No follow-ups on file.      Thank you for allowing me to participate in the care of your patient. Please do not hesitate to contact me with additional questions or concerns.      Kenn Ospina M.D.  Cardiac Electrophysiologist  [facility] / [facility]  [facility]

## 2025-04-10 NOTE — PROGRESS NOTES
Kenn Ospina MD Edelen, Justina, RN  Can we see if we can set her up for cardioversion in [place] with Dr. Richard or one of the other people

## 2025-04-10 NOTE — H&P (VIEW-ONLY)
Cardiac Electrophysiology Outpatient Note  [facility] at [facility]    Office Visit     Coni Tubbs  [id number]  04/04/2025    Primary Care Physician: Donte Finney APRN    Referred By: Darian Richard,*    Subjective     Chief Complaint   Patient presents with    PAF       History of Present Illness:   Coni Tubbs is a 59 y.o. female who presents to my electrophysiology clinic for evaluation of atrial fibrillation.  She believes she was first diagnosed with atrial fibrillation in 2022.  At that point she felt fluttering in her chest.  She initially was treated with flecainide.  She believes this initially did well, but eventually this was stopped.  She was started on sotalol in December and underwent cardioversion for persistent atrial fibrillation.  More recently she has run out of all of her medicines for couple weeks.  She is back in atrial fibrillation today.  Overall she says she feels okay but does have fatigue.  Says she does not sleep well, often due to back pain.  No significant shortness of breath.  No palpitations.  She has a history of sleep apnea but did not tolerate CPAP.  Occasional alcohol use.  Has had significant weight loss, but wants to lose more.  Past Medical History:   Diagnosis Date    Cancer     Diabetes mellitus     Elevated cholesterol     Emphysema, unspecified     Hypertension     Restless leg syndrome     Sleep apnea        Past Surgical History:   Procedure Laterality Date    CARDIOVASCULAR STRESS TEST  08/02/2017    CATARACT EXTRACTION Left     ECHO - CONVERTED  07/05/2017    EYE SURGERY      KNEE ARTHROSCOPY Right 2004    SKIN BIOPSY      SKIN CANCER EXCISION Left 12/2017       Family History   Problem Relation Age of Onset    Heart attack Mother     Heart disease Mother     Diabetes Mother     Kidney failure Mother     Obesity Mother     Kidney disease Father     COPD Father     Stroke Sister     Hypertension  "Sister     COPD Brother     Heart disease Sister     Heart attack Sister     Heart failure Sister        Social History     Socioeconomic History    Marital status: Unknown   Tobacco Use    Smoking status: Never     Passive exposure: Never    Smokeless tobacco: Never   Vaping Use    Vaping status: Never Used   Substance and Sexual Activity    Alcohol use: Yes     Comment:  occ  3-4 times a year    Drug use: No    Sexual activity: Not Currently     Partners: Male     Birth control/protection: Abstinence         Current Outpatient Medications:     allopurinol (ZYLOPRIM) 100 MG tablet, Take 1 tablet by mouth Daily., Disp: , Rfl: 2    apixaban (Eliquis) 5 MG tablet tablet, Take 1 tablet by mouth Every 12 (Twelve) Hours., Disp: 60 tablet, Rfl: 2    fluticasone (FLONASE) 50 MCG/ACT nasal spray, 1 spray by Each Nare route 2 (Two) Times a Day., Disp: , Rfl:     lisinopril (PRINIVIL,ZESTRIL) 5 MG tablet, Take 1 tablet by mouth Daily. (Patient taking differently: Take 1 tablet by mouth As Needed. Every other day), Disp: 90 tablet, Rfl: 1    Ozempic, 0.25 or 0.5 MG/DOSE, 2 MG/3ML solution pen-injector, Inject 0.25 mg under the skin into the appropriate area as directed 1 (One) Time Per Week., Disp: , Rfl:     rosuvastatin (CRESTOR) 5 MG tablet, Take 1 tablet by mouth Daily., Disp: 90 tablet, Rfl: 1    sotalol (BETAPACE) 120 MG tablet, Take 1 tablet by mouth Every 12 (Twelve) Hours., Disp: 60 tablet, Rfl: 3    furosemide (LASIX) 20 MG tablet, Take 1 tablet by mouth Daily As Needed (swelling). (Patient not taking: Reported on 2/5/2025), Disp: , Rfl:     Allergies:   Allergies   Allergen Reactions    Cozaar [Losartan] Hives    Levaquin [Levofloxacin] Hives    Penicillins Other (See Comments)     Unknown     Clindamycin/Lincomycin Itching and Rash    Sulfa Antibiotics Itching and Rash    Vancomycin Itching and Rash       Objective   Vital Signs: Blood pressure 136/100, pulse 91, height 184.2 cm (72.5\"), weight 124 kg (273 lb), " "SpO2 96%.    PHYSICAL EXAM  General appearance: Awake, alert, cooperative  Head: Normocephalic, without obvious abnormality, atraumatic  Neck: No JVD  Lungs: Clear to ascultation bilaterally  Heart: Regular rate and rhythm, no murmurs, 2+ LE pulses, no lower extremity swelling  Skin: Skin color, turgor normal, no rashes or lesions  Neurologic: Grossly normal     Lab Results   Component Value Date    GLUCOSE 182 (H) 12/11/2024    CALCIUM 8.6 12/11/2024     12/11/2024    K 4.2 12/11/2024    CO2 24.6 12/11/2024     12/11/2024    BUN 16 12/11/2024    CREATININE 0.93 12/11/2024    EGFRIFNONA 61 08/01/2017    BCR 17.2 12/11/2024    ANIONGAP 8.4 12/11/2024     Lab Results   Component Value Date    WBC 9.77 12/11/2024    HGB 13.9 12/11/2024    HCT 40.5 12/11/2024    .0 (H) 12/11/2024     12/11/2024     No results found for: \"INR\", \"PROTIME\"  Lab Results   Component Value Date    TSH 2.240 12/09/2024          Results for orders placed during the hospital encounter of 09/20/22    Adult Transthoracic Echo Complete W/ Cont if Necessary Per Protocol    Interpretation Summary  · Technically difficult echo and Doppler study was obtained.  · Normal left ventricular cavity size noted.  · Left ventricular wall thickness is consistent with mild concentric hypertrophy.  · Left ventricular ejection fraction appears to be 61 - 65%. Left ventricular systolic function is normal.  · Left ventricular diastolic function is consistent with (grade II w/high LAP) pseudonormalization.  · Aortic valve is normal, no aortic stenosis or regurgitation noted.  · Mitral valve appears normal, no mitral stenosis or regurgitation noted.  · Mild tricuspid regurgitation with mild pulmonary hypertension noted.  · No pericardial effusion detected.         I personally viewed and interpreted the patient's EKG/Telemetry/lab data    Procedures    Coni Tubbs  reports that she has never smoked. She has never been exposed to " tobacco smoke. She has never used smokeless tobacco.        Advance Care Planning   Advance Care Planning: ACP discussion was held with the patient during this visit. Patient does not have an advance directive, information provided.     Assessment & Plan    1. Atrial fibrillation, persistent status post external cardioversion December 2024  Patient is referred for persistent atrial fibrillation.  She underwent a cardioversion after starting sotalol 80 mg twice daily in December.  She did run out of her medicines at 1 point and is back in atrial fibrillation now.      We discussed the pathophysiology of atrial fibrillation as well as treatment options going forward.  This included discussion of antiarrhythmic therapy as well as catheter ablation.  For the latter we discussed how the procedures performed including the likely of success and potential risks.  We also discussed the options of increasing her sotalol or switching to another medicine such as Tikosyn.    After this discussion she would like to trial increasing sotalol to 120 mg twice daily and planning on repeat cardioversion in 1 to 2 weeks.  Will see if we can schedule this in [place].  Continue on Eliquis.    2. Essential hypertension  Blood pressure is currently borderline elevated.  At home it seems to be well-controlled.  Will have her create a home blood pressure log    3. Obesity (BMI 35.0-39.9 without comorbidity)  BMI today is 36.5.  We discussed the importance of weight loss and reducing the risk of further episodes of atrial fibrillation..    4.  SUSAN  History of sleep apnea but has not been able to tolerate CPAP.  We discussed the relationship with this and atrial fibrillation.       Follow Up:  No follow-ups on file.      Thank you for allowing me to participate in the care of your patient. Please do not hesitate to contact me with additional questions or concerns.      Kenn Ospina M.D.  Cardiac Electrophysiologist  [facility] / [facility]  [facility]

## 2025-04-18 RX ORDER — SOTALOL HYDROCHLORIDE 80 MG/1
80 TABLET ORAL EVERY 12 HOURS SCHEDULED
Qty: 60 TABLET | Refills: 3 | OUTPATIENT
Start: 2025-04-18

## 2025-04-23 ENCOUNTER — ANESTHESIA (OUTPATIENT)
Dept: CARDIOLOGY | Facility: HOSPITAL | Age: 60
End: 2025-04-23
Payer: MEDICARE

## 2025-04-23 ENCOUNTER — ANESTHESIA EVENT (OUTPATIENT)
Dept: CARDIOLOGY | Facility: HOSPITAL | Age: 60
End: 2025-04-23
Payer: MEDICARE

## 2025-04-23 ENCOUNTER — HOSPITAL ENCOUNTER (OUTPATIENT)
Dept: CARDIOLOGY | Facility: HOSPITAL | Age: 60
Discharge: HOME | End: 2025-04-23
Payer: MEDICARE

## 2025-04-23 ENCOUNTER — APPOINTMENT (OUTPATIENT)
Dept: CARDIOLOGY | Facility: HOSPITAL | Age: 60
End: 2025-04-23
Payer: MEDICARE

## 2025-04-23 VITALS
OXYGEN SATURATION: 100 % | DIASTOLIC BLOOD PRESSURE: 86 MMHG | SYSTOLIC BLOOD PRESSURE: 121 MMHG | HEART RATE: 75 BPM | RESPIRATION RATE: 16 BRPM

## 2025-04-23 VITALS
TEMPERATURE: 97.7 F | DIASTOLIC BLOOD PRESSURE: 91 MMHG | SYSTOLIC BLOOD PRESSURE: 142 MMHG | OXYGEN SATURATION: 100 % | HEART RATE: 81 BPM | HEIGHT: 72 IN | RESPIRATION RATE: 18 BRPM | WEIGHT: 267 LBS | BODY MASS INDEX: 36.16 KG/M2

## 2025-04-23 DIAGNOSIS — I48.19 ATRIAL FIBRILLATION, PERSISTENT: ICD-10-CM

## 2025-04-23 LAB
QT INTERVAL: 420 MS
QTC INTERVAL: 446 MS

## 2025-04-23 PROCEDURE — 93312 ECHO TRANSESOPHAGEAL: CPT

## 2025-04-23 PROCEDURE — 93325 DOPPLER ECHO COLOR FLOW MAPG: CPT

## 2025-04-23 PROCEDURE — 93321 DOPPLER ECHO F-UP/LMTD STD: CPT

## 2025-04-23 PROCEDURE — 25010000002 PROPOFOL 10 MG/ML EMULSION: Performed by: NURSE ANESTHETIST, CERTIFIED REGISTERED

## 2025-04-23 PROCEDURE — 92960 CARDIOVERSION ELECTRIC EXT: CPT

## 2025-04-23 PROCEDURE — 93005 ELECTROCARDIOGRAM TRACING: CPT | Performed by: INTERNAL MEDICINE

## 2025-04-23 RX ORDER — PROPOFOL 10 MG/ML
VIAL (ML) INTRAVENOUS AS NEEDED
Status: DISCONTINUED | OUTPATIENT
Start: 2025-04-23 | End: 2025-04-23 | Stop reason: SURG

## 2025-04-23 RX ADMIN — APIXABAN 5 MG: 5 TABLET, FILM COATED ORAL at 11:16

## 2025-04-23 RX ADMIN — PROPOFOL 150 MCG/KG/MIN: 10 INJECTION, EMULSION INTRAVENOUS at 10:44

## 2025-04-23 RX ADMIN — PROPOFOL 50 MG: 10 INJECTION, EMULSION INTRAVENOUS at 10:43

## 2025-04-23 NOTE — NURSING NOTE
Patient/Family given discharge instructions, verbalizes understanding. Patient taken to POV per [facility] staff. Pt took all belongings with her.

## 2025-04-23 NOTE — ANESTHESIA PREPROCEDURE EVALUATION
Anesthesia Evaluation     Patient summary reviewed and Nursing notes reviewed   no history of anesthetic complications:   NPO Solid Status: > 8 hours  NPO Liquid Status: > 8 hours           Airway   Mallampati: II  No difficulty expected  Dental    (+) edentulous    Pulmonary    (+) COPD,shortness of breath, sleep apnea, decreased breath sounds  Cardiovascular   Exercise tolerance: poor (<4 METS)    ECG reviewed  PT is on anticoagulation therapy  Rhythm: irregular  Rate: normal    (+) hypertension, dysrhythmias, MORENO, hyperlipidemia      Neuro/Psych  GI/Hepatic/Renal/Endo    (+) obesity, morbid obesity, diabetes mellitus    Musculoskeletal     Abdominal    Substance History      OB/GYN          Other   arthritis,   history of cancer                    Anesthesia Plan    ASA 3     general   total IV anesthesia    Anesthetic plan, risks, benefits, and alternatives have been provided, discussed and informed consent has been obtained with: patient.  Pre-procedure education provided  Use of blood products discussed with patient .    Plan discussed with attending.        CODE STATUS:

## 2025-04-28 NOTE — INTERVAL H&P NOTE
H&P reviewed. The patient was examined and there are no changes to the H&P.   Patient missed a dose of DOAC yesterday. Will get a RYAN to rule out NEFTALY thrombus and plan for DCCV.

## 2025-05-07 ENCOUNTER — OFFICE VISIT (OUTPATIENT)
Dept: CARDIOLOGY | Facility: CLINIC | Age: 60
End: 2025-05-07
Payer: MEDICARE

## 2025-05-07 VITALS
BODY MASS INDEX: 36 KG/M2 | DIASTOLIC BLOOD PRESSURE: 76 MMHG | HEIGHT: 72 IN | HEART RATE: 68 BPM | WEIGHT: 265.8 LBS | SYSTOLIC BLOOD PRESSURE: 108 MMHG

## 2025-05-07 DIAGNOSIS — I48.19 ATRIAL FIBRILLATION, PERSISTENT: Primary | ICD-10-CM

## 2025-05-07 DIAGNOSIS — Z79.01 CHRONIC ANTICOAGULATION: ICD-10-CM

## 2025-05-07 DIAGNOSIS — E78.2 MIXED HYPERLIPIDEMIA: ICD-10-CM

## 2025-05-07 DIAGNOSIS — I10 ESSENTIAL HYPERTENSION: ICD-10-CM

## 2025-05-07 RX ORDER — SOTALOL HYDROCHLORIDE 80 MG/1
160 TABLET ORAL EVERY 12 HOURS SCHEDULED
Qty: 180 TABLET | Refills: 0 | Status: SHIPPED | OUTPATIENT
Start: 2025-05-07

## 2025-05-08 NOTE — PROGRESS NOTES
subjective     Chief Complaint   Patient presents with    Follow-up     Routine 3 month       Problems Addressed This Visit  1. Atrial fibrillation, persistent    2. Essential hypertension    3. Mixed hyperlipidemia    4. Chronic anticoagulation with Eliquis        History of Present Illness  History of Present Illness    Marlee is a 59 years old white female who is here for cardiology follow-up.  She has history of persistent atrial fibrillation requiring RYAN guided cardioversion on 4/23/2025.  She converted to sinus rhythm however she is in atrial fibrillation now.  Is not clear when she converted back to atrial fibrillation.  She cannot tell and is relatively asymptomatic.  She is taking sotalol 120 twice daily  Options were discussed with the patient including follow-up with Dr. Ospina who she saw recently on 4/4/2025 with the option of catheter ablation versus cardioversion and Tikosyn therapy.    Patient wishes to increase sotalol before going to the neck step.  She states that she has appointment with Dr. Martinez next week and she will discuss further options.    Blood pressure is very well-controlled at this time.  She is not taking her lisinopril regularly  Hyperlipidemia on Crestor therapy she is tolerating it well.    Patient is overweight and has been taking Ozempic to try to lose weight.    Past Surgical History:   Procedure Laterality Date    CARDIOVASCULAR STRESS TEST  08/02/2017    CATARACT EXTRACTION Left     ECHO - CONVERTED  07/05/2017    EYE SURGERY      KNEE ARTHROSCOPY Right 2004    SKIN BIOPSY      SKIN CANCER EXCISION Left 12/2017     Family History   Problem Relation Age of Onset    Heart attack Mother     Heart disease Mother     Diabetes Mother     Kidney failure Mother     Obesity Mother     Kidney disease Father     COPD Father     Stroke Sister     Hypertension Sister     COPD Brother     Heart disease Sister     Heart attack Sister     Heart failure Sister      Past Medical History:    Diagnosis Date    Cancer     Diabetes mellitus     Elevated cholesterol     Emphysema, unspecified     Hypertension     Restless leg syndrome     Sleep apnea      Patient Active Problem List   Diagnosis    Chest pain in adult    Essential hypertension    Bilateral leg edema    Gouty arthritis    Obstructive sleep apnea    MORENO (dyspnea on exertion)    Palpitations    Chronic anticoagulation with Eliquis    Mixed hyperlipidemia    Type 2 diabetes mellitus without complication, without long-term current use of insulin    Obesity (BMI 35.0-39.9 without comorbidity)    Atrial fibrillation, persistent status post external cardioversion December 2024       Social History     Tobacco Use    Smoking status: Never     Passive exposure: Never    Smokeless tobacco: Never   Vaping Use    Vaping status: Never Used   Substance Use Topics    Alcohol use: Yes     Comment:  occ  3-4 times a year    Drug use: No       Allergies   Allergen Reactions    Cozaar [Losartan] Hives    Levaquin [Levofloxacin] Hives    Penicillins Other (See Comments)     Unknown     Clindamycin/Lincomycin Itching and Rash    Sulfa Antibiotics Itching and Rash    Vancomycin Itching and Rash       Current Outpatient Medications on File Prior to Visit   Medication Sig    allopurinol (ZYLOPRIM) 100 MG tablet Take 1 tablet by mouth Daily.    apixaban (Eliquis) 5 MG tablet tablet Take 1 tablet by mouth Every 12 (Twelve) Hours.    fluticasone (FLONASE) 50 MCG/ACT nasal spray 1 spray by Each Nare route 2 (Two) Times a Day.    lisinopril (PRINIVIL,ZESTRIL) 5 MG tablet Take 1 tablet by mouth Daily. (Patient taking differently: Take 1 tablet by mouth As Needed. Every other day)    Ozempic, 0.25 or 0.5 MG/DOSE, 2 MG/3ML solution pen-injector Inject 0.25 mg under the skin into the appropriate area as directed 1 (One) Time Per Week.    rosuvastatin (CRESTOR) 5 MG tablet Take 1 tablet by mouth Daily.     No current facility-administered medications on file prior to visit.      (Not in a hospital admission)      Results for orders placed during the hospital encounter of 04/23/25    Adult Transesophageal Echo (RYAN) W/ Cont if Necessary Per Protocol    Interpretation Summary    Left ventricular ejection fraction appears to be 56 - 60%.    Mild mitral valve regurgitation is present with a centrally-directed jet noted.    The left atrial appendage was visualized through multiple planes. Doppler interrogation shows normal flow within the left atrial appendage. No evidence of a left atrial appendage thrombus was present.    Will proceed with DCCV for persistent atrial fibrillation.    Results for orders placed during the hospital encounter of 08/01/17    Stress Test With Myocardial Perfusion One Day    Interpretation Summary  · Breast attenuation and GI artifacts are present.  · Left ventricular ejection fraction is hyperdynamic (Calculated EF > 70%).  · Myocardial perfusion imaging indicates a normal myocardial perfusion study with no evidence of ischemia.  · Impressions are consistent with a low risk study.  · Findings consistent with a normal ECG stress test.  · Fixed ant wall defect with good wall motion, probably representing breast attenuation.          The following portions of the patient's history were reviewed and updated as appropriate: allergies, current medications, past family history, past medical history, past social history, past surgical history and problem list.    Review of Systems   Constitutional: Negative.   HENT: Negative.  Negative for congestion.    Eyes: Negative.    Cardiovascular: Negative.  Negative for chest pain, cyanosis, dyspnea on exertion, irregular heartbeat, leg swelling, near-syncope, orthopnea, palpitations, paroxysmal nocturnal dyspnea and syncope.   Respiratory: Negative.  Negative for shortness of breath.    Hematologic/Lymphatic: Negative.    Musculoskeletal: Negative.    Gastrointestinal: Negative.    Neurological: Negative.  Negative for  "headaches.          Objective:     /76   Pulse 68   Ht 182.9 cm (72\")   Wt 121 kg (265 lb 12.8 oz)   BMI 36.05 kg/m²   Pulmonary:      Effort: Pulmonary effort is normal.      Breath sounds: Normal breath sounds. No stridor. No wheezing. No rhonchi. No rales.   Cardiovascular:      PMI at left midclavicular line. Normal rate. Regular rhythm. Normal S1. Normal S2.       Murmurs: There is no murmur.      No gallop.  No click. No rub.   Pulses:     Intact distal pulses.   Edema:     Peripheral edema absent.       Physical Exam        Lab Review  Lab Results   Component Value Date     12/11/2024    K 4.2 12/11/2024     12/11/2024    BUN 16 12/11/2024    CREATININE 0.93 12/11/2024    GLUCOSE 182 (H) 12/11/2024    CALCIUM 8.6 12/11/2024    ALT 12 12/09/2024    ALKPHOS 67 12/09/2024     No results found for: \"CKTOTAL\"  Lab Results   Component Value Date    WBC 9.77 12/11/2024    HGB 13.9 12/11/2024    HCT 40.5 12/11/2024     12/11/2024     No results found for: \"INR\"  Lab Results   Component Value Date    MG 1.8 12/11/2024     Lab Results   Component Value Date    TSH 2.240 12/09/2024     No results found for: \"BNP\"  Lab Results   Component Value Date    CHOL 151 08/01/2017    TRIG 154 (H) 08/01/2017    HDL 29 (L) 08/01/2017    VLDL 30.8 08/01/2017    LDL 91 08/01/2017     Lab Results   Component Value Date    LDL 91 08/01/2017     Lab Results   Component Value Date    PROBNP 741.8 12/09/2024                 ECG 12 Lead    Date/Time: 5/8/2025 5:03 PM  Performed by: Darian Richard MD    Authorized by: Darian Richard MD  Comparison: compared with previous ECG from 4/23/2025  Comparison to previous ECG: Atrial fibrillation has replaced sinus rhythm.  Rhythm: atrial fibrillation  Rate: normal  BPM: 80  Conduction: conduction normal  QRS axis: normal  Other findings: non-specific ST-T wave changes    Clinical impression: abnormal EKG          Results       I personally viewed and " interpreted the patient's LAB data         Assessment:     1. Atrial fibrillation, persistent    2. Essential hypertension    3. Mixed hyperlipidemia    4. Chronic anticoagulation with Eliquis        Assessment & Plan  1. Persistent atrial fibrillation.  She has a history of persistent atrial fibrillation requiring RYAN-guided cardioversion on 4/23/2025. She converted to sinus rhythm but reverted to atrial fibrillation within a week. She is currently asymptomatic. Options discussed icatheter ablation, versus cardioversion and Tikosyn therapy.   She prefers to increase sotalol before considering the next steps.   She has an appointment with Dr. Martinez next week and wants to discuss further options.    2. Hyperlipidemia.  She is on Crestor therapy and tolerating it well.    3. Weight management.  She is overweight and has been taking Ozempic to lose weight.      She will continue anticoagulation            No follow-ups on file.

## 2025-05-15 ENCOUNTER — OFFICE VISIT (OUTPATIENT)
Dept: CARDIOLOGY | Facility: CLINIC | Age: 60
End: 2025-05-15
Payer: MEDICARE

## 2025-05-15 VITALS
WEIGHT: 268.4 LBS | HEART RATE: 66 BPM | OXYGEN SATURATION: 98 % | HEIGHT: 72 IN | BODY MASS INDEX: 36.35 KG/M2 | DIASTOLIC BLOOD PRESSURE: 88 MMHG | SYSTOLIC BLOOD PRESSURE: 128 MMHG

## 2025-05-15 DIAGNOSIS — I48.19 ATRIAL FIBRILLATION, PERSISTENT: Primary | ICD-10-CM

## 2025-05-15 DIAGNOSIS — E78.2 MIXED HYPERLIPIDEMIA: ICD-10-CM

## 2025-05-15 DIAGNOSIS — E66.9 OBESITY (BMI 35.0-39.9 WITHOUT COMORBIDITY): ICD-10-CM

## 2025-05-15 DIAGNOSIS — I10 ESSENTIAL HYPERTENSION: ICD-10-CM

## 2025-05-15 DIAGNOSIS — Z79.01 CHRONIC ANTICOAGULATION: ICD-10-CM

## 2025-05-15 RX ORDER — SOTALOL HYDROCHLORIDE 80 MG/1
160 TABLET ORAL 2 TIMES DAILY
Qty: 360 TABLET | Refills: 3 | Status: SHIPPED | OUTPATIENT
Start: 2025-05-15 | End: 2025-05-19 | Stop reason: SDUPTHER

## 2025-05-15 NOTE — PROGRESS NOTES
Donte Finney, LOLIS  No ref. provider found    Coni Tubbs  1965  5/15/2025    Subjective     Coni Tubbs is a 59 y.o. female who presents today to [facility] for Follow-up.    History of Present Illness:    Patient is a pleasant 59 years old  female with past medical history significant for persistent atrial fibrillation [id number] on Eliquis, hypertension, hyperlipidemia who presents today to discuss options of rhythm control for A-fib.    Patient's primary cardiologist is Dr. Larson.  Patient was diagnosed with atrial fibrillation on 8/15/2022.  She was started on flecainide and Eliquis and was able to maintain sinus rhythm until August 25, 2023.  Her flecainide dose was increased and she continued to maintain sinus rhythm until 7/23/2024 when she went back into A-fib.  She states that during that time, she was at a point where she had stopped taking medications and taking good care of herself.  She eventually was back to her normal baseline and was motivated and restarted on flecainide but however could not sustain normal rhythm.  She was in flecainide 100 Mg twice daily which was eventually discontinued on 11/7/2024 with plans to be started on sotalol.  She was admitted to [facility] on 12/9/2024 for sotalol loading.  She was started on sotalol 80 Mg twice daily.  She underwent a successful DCCV on 12/11/2024 5 doses of sotalol and was discharged home on 80 Mg twice daily.  Patient ran out of sotalol and Eliquis at the start of the year and was not able to come for a follow-up office visit due to insurance issues.  Patient finally was able to follow-up with Dr. Larson on 2/5/2025.  She fortunately was still maintaining sinus rhythm.  Medications were refilled.  She had her first appointment with Dr. Ospina on 4/4/25.  Unfortunately, patient was back in A-fib at that time.  Options of increasing sotalol versus Tikosyn  versus cardioversion versus ablation was discussed.  Joint decision of increasing sotalol and trying another cardioversion was made.  Her sotalol was increased to 120 Mg twice daily and patient underwent another DCCV on 4/23/2025 at [facility].  Patient received 1 200 J synchronized biphasic shock which was successful in converting her back to sinus rhythm.    Patient was seen by Dr. Richard as a follow-up on 5/7/2025 at which time she unfortunately went back into A-fib.  Her sotalol was increased to 160 Mg twice daily at that visit.  Patient therefore presents today to discuss further management options.    Patient today notes overall wellbeing she has been tolerating the increased dose of sotalol 160 Mg twice daily.  We did an EKG during that visit and patient now is back in normal sinus rhythm.  QTc within normal limit.  Recent lab works reviewed, kidney functions and potassium levels within normal limit.  She denies any chest pain, shortness of breath, palpitations, lightheadedness, dizziness.  She is excited about her daughter's graduation in few weeks and working towards it.  Patient has been compliant with medications.  She is on Eliquis 5 Mg twice daily, denies any major bleeding episodes.  She is also on lisinopril 5 Mg which she takes every other day.  She is currently on Ozempic and reports that she has lost around 60 pounds in past several months.  She has a history of SUSAN but could not tolerate CPAP.  She states that she sleeps a lot better without the CPAP machine.  No other complaints at this time.        Allergies   Allergen Reactions    Cozaar [Losartan] Hives    Levaquin [Levofloxacin] Hives    Penicillins Other (See Comments)     Unknown     Clindamycin/Lincomycin Itching and Rash    Sulfa Antibiotics Itching and Rash    Vancomycin Itching and Rash   :    Current Outpatient Medications:     allopurinol (ZYLOPRIM) 100 MG tablet, Take 1 tablet by mouth Daily., Disp: , Rfl: 2    apixaban  "(Eliquis) 5 MG tablet tablet, Take 1 tablet by mouth Every 12 (Twelve) Hours., Disp: 60 tablet, Rfl: 2    fluticasone (FLONASE) 50 MCG/ACT nasal spray, 1 spray by Each Nare route 2 (Two) Times a Day., Disp: , Rfl:     lisinopril (PRINIVIL,ZESTRIL) 5 MG tablet, Take 1 tablet by mouth Daily. (Patient taking differently: Take 1 tablet by mouth As Needed. Every other day), Disp: 90 tablet, Rfl: 1    Ozempic, 0.25 or 0.5 MG/DOSE, 2 MG/3ML solution pen-injector, Inject 0.25 mg under the skin into the appropriate area as directed 1 (One) Time Per Week., Disp: , Rfl:     rosuvastatin (CRESTOR) 5 MG tablet, Take 1 tablet by mouth Daily., Disp: 90 tablet, Rfl: 1    sotalol (BETAPACE) 80 MG tablet, Take 2 tablets by mouth 2 (Two) Times a Day., Disp: 360 tablet, Rfl: 3    Past Medical History:   Diagnosis Date    Cancer     Diabetes mellitus     Elevated cholesterol     Emphysema, unspecified     Hypertension     Restless leg syndrome     Sleep apnea      Past Surgical History:   Procedure Laterality Date    CARDIOVASCULAR STRESS TEST  08/02/2017    CATARACT EXTRACTION Left     ECHO - CONVERTED  07/05/2017    EYE SURGERY      KNEE ARTHROSCOPY Right 2004    SKIN BIOPSY      SKIN CANCER EXCISION Left 12/2017     Family History   Problem Relation Age of Onset    Heart attack Mother     Heart disease Mother     Diabetes Mother     Kidney failure Mother     Obesity Mother     Kidney disease Father     COPD Father     Stroke Sister     Hypertension Sister     COPD Brother     Heart disease Sister     Heart attack Sister     Heart failure Sister      Social History     Tobacco Use    Smoking status: Never     Passive exposure: Never    Smokeless tobacco: Never   Vaping Use    Vaping status: Never Used   Substance Use Topics    Alcohol use: Yes     Comment:  occ  3-4 times a year    Drug use: No       Objective   Blood pressure 128/88, pulse 66, height 182.9 cm (72\"), weight 122 kg (268 lb 6.4 oz), SpO2 98%.  Body mass index is 36.4 " kg/m².    Constitutional:       Appearance: Not in distress.   Neck:      Vascular: JVD normal.   Pulmonary:      Effort: Pulmonary effort is normal.      Breath sounds: Normal breath sounds.   Cardiovascular:      PMI at left midclavicular line. Normal rate. Regular rhythm. Normal S1. Normal S2.       Murmurs: There is no murmur.      No gallop.  No click. No rub.   Pulses:     Intact distal pulses.   Edema:     Peripheral edema absent.   Skin:     General: Skin is warm and dry.   Neurological:      Mental Status: Alert and oriented to person, place and time.           DATA:   Results for orders placed during the hospital encounter of 04/23/25    Adult Transesophageal Echo (RYAN) W/ Cont if Necessary Per Protocol    Interpretation Summary    Left ventricular ejection fraction appears to be 56 - 60%.    Mild mitral valve regurgitation is present with a centrally-directed jet noted.    The left atrial appendage was visualized through multiple planes. Doppler interrogation shows normal flow within the left atrial appendage. No evidence of a left atrial appendage thrombus was present.    Will proceed with DCCV for persistent atrial fibrillation.   Results for orders placed during the hospital encounter of 08/01/17    Stress Test With Myocardial Perfusion One Day    Interpretation Summary  · Breast attenuation and GI artifacts are present.  · Left ventricular ejection fraction is hyperdynamic (Calculated EF > 70%).  · Myocardial perfusion imaging indicates a normal myocardial perfusion study with no evidence of ischemia.  · Impressions are consistent with a low risk study.  · Findings consistent with a normal ECG stress test.  · Fixed ant wall defect with good wall motion, probably representing breast attenuation.   Results for orders placed during the hospital encounter of 08/01/17    Stress Test With Myocardial Perfusion One Day    Interpretation Summary  · Breast attenuation and GI artifacts are present.  · Left  "ventricular ejection fraction is hyperdynamic (Calculated EF > 70%).  · Myocardial perfusion imaging indicates a normal myocardial perfusion study with no evidence of ischemia.  · Impressions are consistent with a low risk study.  · Findings consistent with a normal ECG stress test.  · Fixed ant wall defect with good wall motion, probably representing breast attenuation.        No results found for: \"BNP\"  No results found for: \"PSA\"   Lab Results   Component Value Date    MG 1.8 12/11/2024     No results found for: \"INR\"  No results found for: \"CKTOTAL\"  Lab Results   Component Value Date    CHOL 151 08/01/2017     Lab Results   Component Value Date    TRIG 154 (H) 08/01/2017     Lab Results   Component Value Date    HDL 29 (L) 08/01/2017     Lab Results   Component Value Date    LDL 91 08/01/2017     No components found for: \"A1C\"      Lab Results   Component Value Date    TSH 2.240 12/09/2024             Invalid input(s): \"LABALBU\", \"PROT\"        Results review: During today's encounter, all relevant clinical data has been reviewed.        ECG 12 Lead    Date/Time: 5/15/2025 1:57 PM  Performed by: Alisia Martinez MD    Authorized by: Alisia Martinez MD  Comparison: compared with previous ECG from 5/8/2025  Comparison to previous ECG: Sinus rhythm has replaced atrial fibrillation.  Rhythm: sinus rhythm  Rate: normal  BPM: 66  QRS axis: left  Other findings comments: Low voltage precordial leads    Clinical impression: abnormal EKG          Assessment & Plan    Diagnosis Plan   1. Atrial fibrillation, persistent-currently NSR        2. Essential hypertension        3. Chronic anticoagulation with Eliquis        4. Mixed hyperlipidemia        5. Obesity (BMI 35.0-39.9 without comorbidity)              Recommendations  Orders Placed This Encounter   Procedures    ECG 12 Lead        Patient is now back in normal rhythm after her sotalol was increased to 160 mg twice daily on 5/7/2025.  She notes overall wellbeing.  EKG " today shows QTc within normal limit.          Continue sotalol 160 Mg twice daily.          Continue Eliquis 5 Mg twice daily for thromboembolic prophylaxis.          Aggressive risk factor control.    Patient notes good blood pressure control at home.  Currently on lisinopril 5 Mg which she takes every other day.  Discussed goal blood pressure less than 130/80, ideally less than 120/80.  Patient advised to monitor her BP regularly and take lisinopril daily for goal BP not achieved.    Body mass index is 36.4 kg/m².  Aerobic exercise paired with a sustainable change in diet is recommended, specifically reducing the intake of saturated fats and exchanging for mono-unsaturated fats, as well as increasing intake of fruits, vegetables and fiber-rich products. The patient is also instructed to avoid sugary drinks.   Continue Crestor 5 Mg daily, LDL 48.      New Medications:   New Medications Ordered This Visit   Medications    sotalol (BETAPACE) 80 MG tablet     Sig: Take 2 tablets by mouth 2 (Two) Times a Day.     Dispense:  360 tablet     Refill:  3       Discontinued Medications:   Medications Discontinued During This Encounter   Medication Reason    sotalol (BETAPACE) 80 MG tablet Reorder        Patient will follow-up with Dr. Richard as scheduled.      Thank you for allowing me to participate in the care of Coni Tubbs. Feel free to contact me directly with any further questions or concerns.      This document has been electronically signed by Alisia Martinez MD   May 15, 2025 14:02 EDT    Dictated Utilizing Dragon Dictation: Part of this note may be an electronic transcription/translation of spoken language to printed text using the Dragon Dictation System.

## 2025-05-19 RX ORDER — SOTALOL HYDROCHLORIDE 80 MG/1
160 TABLET ORAL 2 TIMES DAILY
Qty: 360 TABLET | Refills: 3 | Status: SHIPPED | OUTPATIENT
Start: 2025-05-19